# Patient Record
Sex: FEMALE | Race: WHITE | NOT HISPANIC OR LATINO | Employment: OTHER | URBAN - METROPOLITAN AREA
[De-identification: names, ages, dates, MRNs, and addresses within clinical notes are randomized per-mention and may not be internally consistent; named-entity substitution may affect disease eponyms.]

---

## 2017-01-17 ENCOUNTER — HOSPITAL ENCOUNTER (OUTPATIENT)
Dept: LAB | Facility: MEDICAL CENTER | Age: 67
End: 2017-01-17
Attending: ORTHOPAEDIC SURGERY
Payer: MEDICARE

## 2017-01-17 LAB
ANION GAP SERPL CALC-SCNC: 9 MMOL/L (ref 0–11.9)
BASOPHILS # BLD AUTO: 0.03 K/UL (ref 0–0.12)
BASOPHILS NFR BLD AUTO: 0.6 % (ref 0–1.8)
BUN SERPL-MCNC: 13 MG/DL (ref 8–22)
CALCIUM SERPL-MCNC: 9.7 MG/DL (ref 8.5–10.5)
CHLORIDE SERPL-SCNC: 104 MMOL/L (ref 96–112)
CO2 SERPL-SCNC: 25 MMOL/L (ref 20–33)
CREAT SERPL-MCNC: 0.96 MG/DL (ref 0.5–1.4)
EOSINOPHIL # BLD: 0.17 K/UL (ref 0–0.51)
EOSINOPHIL NFR BLD AUTO: 3.2 % (ref 0–6.9)
ERYTHROCYTE [DISTWIDTH] IN BLOOD BY AUTOMATED COUNT: 41.8 FL (ref 35.9–50)
GLUCOSE SERPL-MCNC: 118 MG/DL (ref 65–99)
HCT VFR BLD AUTO: 40.9 % (ref 37–47)
HGB BLD-MCNC: 14.1 G/DL (ref 12–16)
IMM GRANULOCYTES # BLD AUTO: 0.01 K/UL (ref 0–0.11)
IMM GRANULOCYTES NFR BLD AUTO: 0.2 % (ref 0–0.9)
INR PPP: 1.06 (ref 0.87–1.13)
LYMPHOCYTES # BLD: 1.02 K/UL (ref 1–4.8)
LYMPHOCYTES NFR BLD AUTO: 19 % (ref 22–41)
MCH RBC QN AUTO: 29.8 PG (ref 27–33)
MCHC RBC AUTO-ENTMCNC: 34.5 G/DL (ref 33.6–35)
MCV RBC AUTO: 86.5 FL (ref 81.4–97.8)
MONOCYTES # BLD: 0.47 K/UL (ref 0–0.85)
MONOCYTES NFR BLD AUTO: 8.7 % (ref 0–13.4)
NEUTROPHILS # BLD: 3.68 K/UL (ref 2–7.15)
NEUTROPHILS NFR BLD AUTO: 68.3 % (ref 44–72)
NRBC # BLD AUTO: 0 K/UL
NRBC BLD-RTO: 0 /100 WBC
PLATELET # BLD AUTO: 239 K/UL (ref 164–446)
PMV BLD AUTO: 9.5 FL (ref 9–12.9)
POTASSIUM SERPL-SCNC: 3.7 MMOL/L (ref 3.6–5.5)
PROTHROMBIN TIME: 14.1 SEC (ref 12–14.6)
RBC # BLD AUTO: 4.73 M/UL (ref 4.2–5.4)
SODIUM SERPL-SCNC: 138 MMOL/L (ref 135–145)
WBC # BLD AUTO: 5.4 K/UL (ref 4.8–10.8)

## 2017-01-17 PROCEDURE — 80048 BASIC METABOLIC PNL TOTAL CA: CPT

## 2017-01-17 PROCEDURE — 36415 COLL VENOUS BLD VENIPUNCTURE: CPT

## 2017-01-17 PROCEDURE — 85610 PROTHROMBIN TIME: CPT | Mod: GA

## 2017-01-17 PROCEDURE — 85025 COMPLETE CBC W/AUTO DIFF WBC: CPT

## 2017-01-19 RX ORDER — TRAZODONE HYDROCHLORIDE 50 MG/1
TABLET ORAL
Qty: 270 TAB | Refills: 0 | Status: SHIPPED | OUTPATIENT
Start: 2017-01-19 | End: 2017-03-15

## 2017-03-15 ENCOUNTER — HOSPITAL ENCOUNTER (INPATIENT)
Facility: MEDICAL CENTER | Age: 67
LOS: 11 days | DRG: 464 | End: 2017-03-27
Attending: EMERGENCY MEDICINE | Admitting: ORTHOPAEDIC SURGERY
Payer: MEDICARE

## 2017-03-15 ENCOUNTER — APPOINTMENT (OUTPATIENT)
Dept: RADIOLOGY | Facility: MEDICAL CENTER | Age: 67
DRG: 464 | End: 2017-03-15
Attending: EMERGENCY MEDICINE
Payer: MEDICARE

## 2017-03-15 DIAGNOSIS — T88.8XXA FLUID COLLECTION AT SURGICAL SITE, INITIAL ENCOUNTER: ICD-10-CM

## 2017-03-15 LAB
ALBUMIN SERPL BCP-MCNC: 4.4 G/DL (ref 3.2–4.9)
ALBUMIN/GLOB SERPL: 1.8 G/DL
ALP SERPL-CCNC: 152 U/L (ref 30–99)
ALT SERPL-CCNC: 280 U/L (ref 2–50)
ANION GAP SERPL CALC-SCNC: 11 MMOL/L (ref 0–11.9)
AST SERPL-CCNC: 330 U/L (ref 12–45)
BASOPHILS # BLD AUTO: 0.1 % (ref 0–1.8)
BASOPHILS # BLD: 0.02 K/UL (ref 0–0.12)
BILIRUB SERPL-MCNC: 2.5 MG/DL (ref 0.1–1.5)
BUN SERPL-MCNC: 19 MG/DL (ref 8–22)
CALCIUM SERPL-MCNC: 9 MG/DL (ref 8.5–10.5)
CHLORIDE SERPL-SCNC: 101 MMOL/L (ref 96–112)
CO2 SERPL-SCNC: 22 MMOL/L (ref 20–33)
CREAT SERPL-MCNC: 1.24 MG/DL (ref 0.5–1.4)
EOSINOPHIL # BLD AUTO: 0.01 K/UL (ref 0–0.51)
EOSINOPHIL NFR BLD: 0.1 % (ref 0–6.9)
ERYTHROCYTE [DISTWIDTH] IN BLOOD BY AUTOMATED COUNT: 43.7 FL (ref 35.9–50)
GFR SERPL CREATININE-BSD FRML MDRD: 43 ML/MIN/1.73 M 2
GLOBULIN SER CALC-MCNC: 2.5 G/DL (ref 1.9–3.5)
GLUCOSE SERPL-MCNC: 132 MG/DL (ref 65–99)
HCT VFR BLD AUTO: 37.7 % (ref 37–47)
HGB BLD-MCNC: 12.9 G/DL (ref 12–16)
LYMPHOCYTES # BLD AUTO: 0.44 K/UL (ref 1–4.8)
LYMPHOCYTES NFR BLD: 2.9 % (ref 22–41)
MCH RBC QN AUTO: 29.5 PG (ref 27–33)
MCHC RBC AUTO-ENTMCNC: 34.2 G/DL (ref 33.6–35)
MCV RBC AUTO: 86.3 FL (ref 81.4–97.8)
MONOCYTES # BLD AUTO: 0.87 K/UL (ref 0–0.85)
MONOCYTES NFR BLD AUTO: 5.8 % (ref 0–13.4)
NEUTROPHILS # BLD AUTO: 13.71 K/UL (ref 2–7.15)
NEUTROPHILS NFR BLD: 91.1 % (ref 44–72)
PLATELET # BLD AUTO: 205 K/UL (ref 164–446)
PMV BLD AUTO: 9.9 FL (ref 9–12.9)
POTASSIUM SERPL-SCNC: 4.1 MMOL/L (ref 3.6–5.5)
PROT SERPL-MCNC: 6.9 G/DL (ref 6–8.2)
RBC # BLD AUTO: 4.37 M/UL (ref 4.2–5.4)
SODIUM SERPL-SCNC: 134 MMOL/L (ref 135–145)
WBC # BLD AUTO: 15.1 K/UL (ref 4.8–10.8)

## 2017-03-15 PROCEDURE — 700102 HCHG RX REV CODE 250 W/ 637 OVERRIDE(OP): Performed by: EMERGENCY MEDICINE

## 2017-03-15 PROCEDURE — 85025 COMPLETE CBC W/AUTO DIFF WBC: CPT

## 2017-03-15 PROCEDURE — 80053 COMPREHEN METABOLIC PANEL: CPT

## 2017-03-15 PROCEDURE — 99285 EMERGENCY DEPT VISIT HI MDM: CPT

## 2017-03-15 PROCEDURE — 93971 EXTREMITY STUDY: CPT

## 2017-03-15 PROCEDURE — 36415 COLL VENOUS BLD VENIPUNCTURE: CPT

## 2017-03-15 PROCEDURE — 304562 HCHG STAT O2 MASK/CANNULA

## 2017-03-15 PROCEDURE — A9270 NON-COVERED ITEM OR SERVICE: HCPCS | Performed by: EMERGENCY MEDICINE

## 2017-03-15 RX ORDER — OXYCODONE HYDROCHLORIDE 5 MG/1
5 TABLET ORAL ONCE
Status: COMPLETED | OUTPATIENT
Start: 2017-03-15 | End: 2017-03-15

## 2017-03-15 RX ORDER — OXYCODONE AND ACETAMINOPHEN 10; 325 MG/1; MG/1
1-2 TABLET ORAL EVERY 4 HOURS PRN
COMMUNITY

## 2017-03-15 RX ADMIN — OXYCODONE HYDROCHLORIDE 5 MG: 5 TABLET ORAL at 22:30

## 2017-03-15 ASSESSMENT — PAIN SCALES - GENERAL: PAINLEVEL_OUTOF10: 8

## 2017-03-15 ASSESSMENT — PAIN SCALES - WONG BAKER: WONGBAKER_NUMERICALRESPONSE: HURTS A WHOLE LOT

## 2017-03-15 ASSESSMENT — LIFESTYLE VARIABLES: DO YOU DRINK ALCOHOL: NO

## 2017-03-16 ENCOUNTER — APPOINTMENT (OUTPATIENT)
Dept: RADIOLOGY | Facility: MEDICAL CENTER | Age: 67
DRG: 464 | End: 2017-03-16
Attending: ORTHOPAEDIC SURGERY
Payer: MEDICARE

## 2017-03-16 ENCOUNTER — APPOINTMENT (OUTPATIENT)
Dept: RADIOLOGY | Facility: MEDICAL CENTER | Age: 67
DRG: 464 | End: 2017-03-16
Attending: EMERGENCY MEDICINE
Payer: MEDICARE

## 2017-03-16 ENCOUNTER — APPOINTMENT (OUTPATIENT)
Dept: RADIOLOGY | Facility: MEDICAL CENTER | Age: 67
DRG: 464 | End: 2017-03-16
Attending: PHYSICIAN ASSISTANT
Payer: MEDICARE

## 2017-03-16 ENCOUNTER — RESOLUTE PROFESSIONAL BILLING HOSPITAL PROF FEE (OUTPATIENT)
Dept: HOSPITALIST | Facility: MEDICAL CENTER | Age: 67
End: 2017-03-16
Payer: MEDICARE

## 2017-03-16 PROBLEM — R79.89 ELEVATED LFTS: Status: ACTIVE | Noted: 2017-03-16

## 2017-03-16 PROBLEM — K76.0 FATTY LIVER DISEASE, NONALCOHOLIC: Status: ACTIVE | Noted: 2017-03-16

## 2017-03-16 PROBLEM — K02.9 DENTAL CARIES: Status: ACTIVE | Noted: 2017-03-16

## 2017-03-16 PROBLEM — T88.8XXA FLUID COLLECTION AT SURGICAL SITE: Status: ACTIVE | Noted: 2017-03-16

## 2017-03-16 PROBLEM — K83.8 COMMON BILE DUCT DILATATION: Status: ACTIVE | Noted: 2017-03-16

## 2017-03-16 LAB
ALBUMIN SERPL BCP-MCNC: 4.3 G/DL (ref 3.2–4.9)
ALP SERPL-CCNC: 125 U/L (ref 30–99)
ALT SERPL-CCNC: 206 U/L (ref 2–50)
APAP SERPL-MCNC: <10 UG/ML (ref 10–30)
APAP SERPL-MCNC: <10 UG/ML (ref 10–30)
AST SERPL-CCNC: 192 U/L (ref 12–45)
BILIRUB CONJ SERPL-MCNC: 0.9 MG/DL (ref 0.1–0.5)
BILIRUB INDIRECT SERPL-MCNC: 1.5 MG/DL (ref 0–1)
BILIRUB SERPL-MCNC: 2.4 MG/DL (ref 0.1–1.5)
GLUCOSE BLD-MCNC: 135 MG/DL (ref 65–99)
LACTATE BLD-SCNC: 1.2 MMOL/L (ref 0.5–2)
LACTATE BLD-SCNC: 1.5 MMOL/L (ref 0.5–2)
PROT SERPL-MCNC: 6.9 G/DL (ref 6–8.2)

## 2017-03-16 PROCEDURE — 0HBLXZZ EXCISION OF LEFT LOWER LEG SKIN, EXTERNAL APPROACH: ICD-10-PCS | Performed by: ORTHOPAEDIC SURGERY

## 2017-03-16 PROCEDURE — 87077 CULTURE AEROBIC IDENTIFY: CPT

## 2017-03-16 PROCEDURE — 0S9B3ZX DRAINAGE OF LEFT HIP JOINT, PERCUTANEOUS APPROACH, DIAGNOSTIC: ICD-10-PCS | Performed by: ORTHOPAEDIC SURGERY

## 2017-03-16 PROCEDURE — 700102 HCHG RX REV CODE 250 W/ 637 OVERRIDE(OP)

## 2017-03-16 PROCEDURE — 110382 HCHG SHELL REV 271: Performed by: ORTHOPAEDIC SURGERY

## 2017-03-16 PROCEDURE — 80076 HEPATIC FUNCTION PANEL: CPT

## 2017-03-16 PROCEDURE — 502000 HCHG MISC OR IMPLANTS RC 0278: Performed by: ORTHOPAEDIC SURGERY

## 2017-03-16 PROCEDURE — 160009 HCHG ANES TIME/MIN: Performed by: ORTHOPAEDIC SURGERY

## 2017-03-16 PROCEDURE — A9270 NON-COVERED ITEM OR SERVICE: HCPCS | Performed by: NURSE PRACTITIONER

## 2017-03-16 PROCEDURE — 99223 1ST HOSP IP/OBS HIGH 75: CPT | Mod: AI | Performed by: INTERNAL MEDICINE

## 2017-03-16 PROCEDURE — L1830 KO IMMOB CANVAS LONG PRE OTS: HCPCS | Performed by: ORTHOPAEDIC SURGERY

## 2017-03-16 PROCEDURE — 110371 HCHG SHELL REV 272: Performed by: ORTHOPAEDIC SURGERY

## 2017-03-16 PROCEDURE — 36415 COLL VENOUS BLD VENIPUNCTURE: CPT

## 2017-03-16 PROCEDURE — 3E0U029 INTRODUCTION OF OTHER ANTI-INFECTIVE INTO JOINTS, OPEN APPROACH: ICD-10-PCS | Performed by: ORTHOPAEDIC SURGERY

## 2017-03-16 PROCEDURE — 700111 HCHG RX REV CODE 636 W/ 250 OVERRIDE (IP): Performed by: PHARMACIST

## 2017-03-16 PROCEDURE — 76705 ECHO EXAM OF ABDOMEN: CPT

## 2017-03-16 PROCEDURE — 500088 HCHG BLADE, SAGITTAL: Performed by: ORTHOPAEDIC SURGERY

## 2017-03-16 PROCEDURE — 700111 HCHG RX REV CODE 636 W/ 250 OVERRIDE (IP)

## 2017-03-16 PROCEDURE — 87205 SMEAR GRAM STAIN: CPT | Mod: 91

## 2017-03-16 PROCEDURE — 700117 HCHG RX CONTRAST REV CODE 255: Performed by: EMERGENCY MEDICINE

## 2017-03-16 PROCEDURE — 87015 SPECIMEN INFECT AGNT CONCNTJ: CPT

## 2017-03-16 PROCEDURE — 87070 CULTURE OTHR SPECIMN AEROBIC: CPT | Mod: 91

## 2017-03-16 PROCEDURE — 700105 HCHG RX REV CODE 258

## 2017-03-16 PROCEDURE — 160036 HCHG PACU - EA ADDL 30 MINS PHASE I: Performed by: ORTHOPAEDIC SURGERY

## 2017-03-16 PROCEDURE — 0QBF0ZX EXCISION OF LEFT PATELLA, OPEN APPROACH, DIAGNOSTIC: ICD-10-PCS | Performed by: ORTHOPAEDIC SURGERY

## 2017-03-16 PROCEDURE — 700102 HCHG RX REV CODE 250 W/ 637 OVERRIDE(OP): Performed by: NURSE PRACTITIONER

## 2017-03-16 PROCEDURE — A4314 CATH W/DRAINAGE 2-WAY LATEX: HCPCS | Performed by: ORTHOPAEDIC SURGERY

## 2017-03-16 PROCEDURE — A6223 GAUZE >16<=48 NO W/SAL W/O B: HCPCS | Performed by: ORTHOPAEDIC SURGERY

## 2017-03-16 PROCEDURE — 80307 DRUG TEST PRSMV CHEM ANLYZR: CPT

## 2017-03-16 PROCEDURE — 160039 HCHG SURGERY MINUTES - EA ADDL 1 MIN LEVEL 3: Performed by: ORTHOPAEDIC SURGERY

## 2017-03-16 PROCEDURE — 0SUD09Z SUPPLEMENT LEFT KNEE JOINT WITH LINER, OPEN APPROACH: ICD-10-PCS | Performed by: ORTHOPAEDIC SURGERY

## 2017-03-16 PROCEDURE — 0QBC0ZX EXCISION OF LEFT LOWER FEMUR, OPEN APPROACH, DIAGNOSTIC: ICD-10-PCS | Performed by: ORTHOPAEDIC SURGERY

## 2017-03-16 PROCEDURE — 73701 CT LOWER EXTREMITY W/DYE: CPT | Mod: LT

## 2017-03-16 PROCEDURE — 700105 HCHG RX REV CODE 258: Performed by: PHARMACIST

## 2017-03-16 PROCEDURE — 502240 HCHG MISC OR SUPPLY RC 0272: Performed by: ORTHOPAEDIC SURGERY

## 2017-03-16 PROCEDURE — 700105 HCHG RX REV CODE 258: Performed by: INTERNAL MEDICINE

## 2017-03-16 PROCEDURE — A9270 NON-COVERED ITEM OR SERVICE: HCPCS

## 2017-03-16 PROCEDURE — A4606 OXYGEN PROBE USED W OXIMETER: HCPCS | Performed by: ORTHOPAEDIC SURGERY

## 2017-03-16 PROCEDURE — 500811 HCHG LENS/HOOD FOR SPACESUIT: Performed by: ORTHOPAEDIC SURGERY

## 2017-03-16 PROCEDURE — 501487 HCHG STRYKER TIP: Performed by: ORTHOPAEDIC SURGERY

## 2017-03-16 PROCEDURE — 700111 HCHG RX REV CODE 636 W/ 250 OVERRIDE (IP): Performed by: EMERGENCY MEDICINE

## 2017-03-16 PROCEDURE — 160035 HCHG PACU - 1ST 60 MINS PHASE I: Performed by: ORTHOPAEDIC SURGERY

## 2017-03-16 PROCEDURE — 160048 HCHG OR STATISTICAL LEVEL 1-5: Performed by: ORTHOPAEDIC SURGERY

## 2017-03-16 PROCEDURE — 700111 HCHG RX REV CODE 636 W/ 250 OVERRIDE (IP): Performed by: INTERNAL MEDICINE

## 2017-03-16 PROCEDURE — 0HBJXZZ EXCISION OF LEFT UPPER LEG SKIN, EXTERNAL APPROACH: ICD-10-PCS | Performed by: ORTHOPAEDIC SURGERY

## 2017-03-16 PROCEDURE — 500053 HCHG BANDAGE, ELASTIC 4: Performed by: ORTHOPAEDIC SURGERY

## 2017-03-16 PROCEDURE — 73501 X-RAY EXAM HIP UNI 1 VIEW: CPT | Mod: LT

## 2017-03-16 PROCEDURE — 502579 HCHG PACK, TOTAL KNEE: Performed by: ORTHOPAEDIC SURGERY

## 2017-03-16 PROCEDURE — 501486 HCHG STRYKER IRRIG SET HC W/TUBING: Performed by: ORTHOPAEDIC SURGERY

## 2017-03-16 PROCEDURE — 501838 HCHG SUTURE GENERAL: Performed by: ORTHOPAEDIC SURGERY

## 2017-03-16 PROCEDURE — 96374 THER/PROPH/DIAG INJ IV PUSH: CPT

## 2017-03-16 PROCEDURE — 160002 HCHG RECOVERY MINUTES (STAT): Performed by: ORTHOPAEDIC SURGERY

## 2017-03-16 PROCEDURE — 500881 HCHG PACK, EXTREMITY: Performed by: ORTHOPAEDIC SURGERY

## 2017-03-16 PROCEDURE — 87040 BLOOD CULTURE FOR BACTERIA: CPT

## 2017-03-16 PROCEDURE — 770006 HCHG ROOM/CARE - MED/SURG/GYN SEMI*

## 2017-03-16 PROCEDURE — 83605 ASSAY OF LACTIC ACID: CPT

## 2017-03-16 PROCEDURE — 501330 HCHG SET, CYSTO IRRIG TUBING: Performed by: ORTHOPAEDIC SURGERY

## 2017-03-16 PROCEDURE — 0S9D3ZX DRAINAGE OF LEFT KNEE JOINT, PERCUTANEOUS APPROACH, DIAGNOSTIC: ICD-10-PCS | Performed by: ORTHOPAEDIC SURGERY

## 2017-03-16 PROCEDURE — 0SPD09Z REMOVAL OF LINER FROM LEFT KNEE JOINT, OPEN APPROACH: ICD-10-PCS | Performed by: ORTHOPAEDIC SURGERY

## 2017-03-16 PROCEDURE — 82962 GLUCOSE BLOOD TEST: CPT

## 2017-03-16 PROCEDURE — 87075 CULTR BACTERIA EXCEPT BLOOD: CPT | Mod: 91

## 2017-03-16 PROCEDURE — 0SPS0JZ REMOVAL OF SYNTHETIC SUBSTITUTE FROM LEFT HIP JOINT, FEMORAL SURFACE, OPEN APPROACH: ICD-10-PCS | Performed by: ORTHOPAEDIC SURGERY

## 2017-03-16 PROCEDURE — 160028 HCHG SURGERY MINUTES - 1ST 30 MINS LEVEL 3: Performed by: ORTHOPAEDIC SURGERY

## 2017-03-16 PROCEDURE — 87186 SC STD MICRODIL/AGAR DIL: CPT

## 2017-03-16 PROCEDURE — 501480 HCHG STOCKINETTE: Performed by: ORTHOPAEDIC SURGERY

## 2017-03-16 PROCEDURE — 500093 HCHG BONE CEMENT MIXER: Performed by: ORTHOPAEDIC SURGERY

## 2017-03-16 DEVICE — BEADS STIMULAN CALCIUM SULFATE: Type: IMPLANTABLE DEVICE | Status: FUNCTIONAL

## 2017-03-16 RX ORDER — MORPHINE SULFATE 4 MG/ML
2-4 INJECTION, SOLUTION INTRAMUSCULAR; INTRAVENOUS
Status: DISCONTINUED | OUTPATIENT
Start: 2017-03-16 | End: 2017-03-27 | Stop reason: HOSPADM

## 2017-03-16 RX ORDER — TRAZODONE HYDROCHLORIDE 150 MG/1
150 TABLET ORAL
Status: DISCONTINUED | OUTPATIENT
Start: 2017-03-16 | End: 2017-03-27 | Stop reason: HOSPADM

## 2017-03-16 RX ORDER — OXYCODONE HCL 5 MG/5 ML
SOLUTION, ORAL ORAL
Status: COMPLETED
Start: 2017-03-16 | End: 2017-03-16

## 2017-03-16 RX ORDER — SODIUM CHLORIDE 9 MG/ML
500 INJECTION, SOLUTION INTRAVENOUS
Status: COMPLETED | OUTPATIENT
Start: 2017-03-16 | End: 2017-03-27

## 2017-03-16 RX ORDER — ONDANSETRON 2 MG/ML
4 INJECTION INTRAMUSCULAR; INTRAVENOUS ONCE
Status: COMPLETED | OUTPATIENT
Start: 2017-03-16 | End: 2017-03-16

## 2017-03-16 RX ORDER — OXYCODONE HYDROCHLORIDE 5 MG/1
5 TABLET ORAL EVERY 6 HOURS PRN
Status: DISCONTINUED | OUTPATIENT
Start: 2017-03-16 | End: 2017-03-17

## 2017-03-16 RX ORDER — TRAMADOL HYDROCHLORIDE 50 MG/1
50 TABLET ORAL EVERY 6 HOURS PRN
Status: DISCONTINUED | OUTPATIENT
Start: 2017-03-16 | End: 2017-03-27 | Stop reason: HOSPADM

## 2017-03-16 RX ORDER — AMOXICILLIN 250 MG
2 CAPSULE ORAL 2 TIMES DAILY
Status: DISCONTINUED | OUTPATIENT
Start: 2017-03-16 | End: 2017-03-27 | Stop reason: HOSPADM

## 2017-03-16 RX ORDER — SODIUM CHLORIDE 9 MG/ML
INJECTION, SOLUTION INTRAVENOUS
Status: COMPLETED
Start: 2017-03-16 | End: 2017-03-16

## 2017-03-16 RX ORDER — OXYCODONE HYDROCHLORIDE 10 MG/1
10 TABLET ORAL EVERY 6 HOURS PRN
Status: DISCONTINUED | OUTPATIENT
Start: 2017-03-16 | End: 2017-03-17

## 2017-03-16 RX ORDER — LOSARTAN POTASSIUM 50 MG/1
50 TABLET ORAL DAILY
Status: DISCONTINUED | OUTPATIENT
Start: 2017-03-16 | End: 2017-03-27 | Stop reason: HOSPADM

## 2017-03-16 RX ORDER — MIDAZOLAM HYDROCHLORIDE 1 MG/ML
INJECTION INTRAMUSCULAR; INTRAVENOUS
Status: COMPLETED
Start: 2017-03-16 | End: 2017-03-16

## 2017-03-16 RX ORDER — SODIUM CHLORIDE 9 MG/ML
30 INJECTION, SOLUTION INTRAVENOUS
Status: DISCONTINUED | OUTPATIENT
Start: 2017-03-16 | End: 2017-03-27 | Stop reason: HOSPADM

## 2017-03-16 RX ORDER — ONDANSETRON 2 MG/ML
4 INJECTION INTRAMUSCULAR; INTRAVENOUS EVERY 4 HOURS PRN
Status: DISCONTINUED | OUTPATIENT
Start: 2017-03-16 | End: 2017-03-27 | Stop reason: HOSPADM

## 2017-03-16 RX ORDER — POLYETHYLENE GLYCOL 3350 17 G/17G
1 POWDER, FOR SOLUTION ORAL
Status: DISCONTINUED | OUTPATIENT
Start: 2017-03-16 | End: 2017-03-27 | Stop reason: HOSPADM

## 2017-03-16 RX ORDER — BISACODYL 10 MG
10 SUPPOSITORY, RECTAL RECTAL
Status: DISCONTINUED | OUTPATIENT
Start: 2017-03-16 | End: 2017-03-27 | Stop reason: HOSPADM

## 2017-03-16 RX ORDER — OXYBUTYNIN CHLORIDE 5 MG/1
10 TABLET ORAL
Status: DISCONTINUED | OUTPATIENT
Start: 2017-03-16 | End: 2017-03-27 | Stop reason: HOSPADM

## 2017-03-16 RX ORDER — ONDANSETRON 4 MG/1
4 TABLET, ORALLY DISINTEGRATING ORAL EVERY 4 HOURS PRN
Status: DISCONTINUED | OUTPATIENT
Start: 2017-03-16 | End: 2017-03-27 | Stop reason: HOSPADM

## 2017-03-16 RX ORDER — SODIUM CHLORIDE 9 MG/ML
INJECTION, SOLUTION INTRAVENOUS CONTINUOUS
Status: DISCONTINUED | OUTPATIENT
Start: 2017-03-16 | End: 2017-03-17

## 2017-03-16 RX ORDER — OMEPRAZOLE 20 MG/1
20 CAPSULE, DELAYED RELEASE ORAL DAILY
Status: DISCONTINUED | OUTPATIENT
Start: 2017-03-16 | End: 2017-03-27 | Stop reason: HOSPADM

## 2017-03-16 RX ORDER — SERTRALINE HYDROCHLORIDE 100 MG/1
100 TABLET, FILM COATED ORAL DAILY
Status: DISCONTINUED | OUTPATIENT
Start: 2017-03-16 | End: 2017-03-27 | Stop reason: HOSPADM

## 2017-03-16 RX ORDER — MORPHINE SULFATE 4 MG/ML
2 INJECTION, SOLUTION INTRAMUSCULAR; INTRAVENOUS
Status: DISCONTINUED | OUTPATIENT
Start: 2017-03-16 | End: 2017-03-16 | Stop reason: CLARIF

## 2017-03-16 RX ADMIN — OXYCODONE HYDROCHLORIDE 10 MG: 5 SOLUTION ORAL at 20:30

## 2017-03-16 RX ADMIN — MORPHINE SULFATE 4 MG: 4 INJECTION INTRAVENOUS at 05:22

## 2017-03-16 RX ADMIN — PIPERACILLIN AND TAZOBACTAM 3.38 G: 3; .375 INJECTION, POWDER, LYOPHILIZED, FOR SOLUTION INTRAVENOUS; PARENTERAL at 23:26

## 2017-03-16 RX ADMIN — MIDAZOLAM 0.5 MG: 1 INJECTION INTRAMUSCULAR; INTRAVENOUS at 20:30

## 2017-03-16 RX ADMIN — MORPHINE SULFATE 2 MG: 4 INJECTION INTRAVENOUS at 07:58

## 2017-03-16 RX ADMIN — OXYCODONE HYDROCHLORIDE 10 MG: 10 TABLET ORAL at 23:26

## 2017-03-16 RX ADMIN — PIPERACILLIN AND TAZOBACTAM 3.38 G: 3; .375 INJECTION, POWDER, LYOPHILIZED, FOR SOLUTION INTRAVENOUS; PARENTERAL at 12:28

## 2017-03-16 RX ADMIN — SODIUM CHLORIDE: 9 INJECTION, SOLUTION INTRAVENOUS at 05:22

## 2017-03-16 RX ADMIN — MIDAZOLAM 0.5 MG: 1 INJECTION INTRAMUSCULAR; INTRAVENOUS at 21:05

## 2017-03-16 RX ADMIN — MORPHINE SULFATE 4 MG: 4 INJECTION INTRAVENOUS at 23:26

## 2017-03-16 RX ADMIN — FENTANYL CITRATE 50 MCG: 50 INJECTION, SOLUTION INTRAMUSCULAR; INTRAVENOUS at 20:50

## 2017-03-16 RX ADMIN — MIDAZOLAM 0.5 MG: 1 INJECTION INTRAMUSCULAR; INTRAVENOUS at 21:10

## 2017-03-16 RX ADMIN — MIDAZOLAM 0.5 MG: 1 INJECTION INTRAMUSCULAR; INTRAVENOUS at 21:20

## 2017-03-16 RX ADMIN — MIDAZOLAM 0.5 MG: 1 INJECTION INTRAMUSCULAR; INTRAVENOUS at 20:45

## 2017-03-16 RX ADMIN — FENTANYL CITRATE 50 MCG: 50 INJECTION, SOLUTION INTRAMUSCULAR; INTRAVENOUS at 20:35

## 2017-03-16 RX ADMIN — FENTANYL CITRATE 50 MCG: 50 INJECTION, SOLUTION INTRAMUSCULAR; INTRAVENOUS at 20:20

## 2017-03-16 RX ADMIN — IOHEXOL 100 ML: 350 INJECTION, SOLUTION INTRAVENOUS at 01:42

## 2017-03-16 RX ADMIN — MIDAZOLAM 0.5 MG: 1 INJECTION INTRAMUSCULAR; INTRAVENOUS at 21:30

## 2017-03-16 RX ADMIN — PIPERACILLIN AND TAZOBACTAM 3.38 G: 3; .375 INJECTION, POWDER, LYOPHILIZED, FOR SOLUTION INTRAVENOUS; PARENTERAL at 17:19

## 2017-03-16 RX ADMIN — ONDANSETRON 4 MG: 2 INJECTION, SOLUTION INTRAMUSCULAR; INTRAVENOUS at 00:47

## 2017-03-16 RX ADMIN — SODIUM CHLORIDE: 9 INJECTION, SOLUTION INTRAVENOUS at 21:15

## 2017-03-16 RX ADMIN — MORPHINE SULFATE 2 MG: 4 INJECTION INTRAVENOUS at 12:28

## 2017-03-16 RX ADMIN — VANCOMYCIN HYDROCHLORIDE 2200 MG: 100 INJECTION, POWDER, LYOPHILIZED, FOR SOLUTION INTRAVENOUS at 08:07

## 2017-03-16 RX ADMIN — FENTANYL CITRATE 50 MCG: 50 INJECTION, SOLUTION INTRAMUSCULAR; INTRAVENOUS at 21:00

## 2017-03-16 RX ADMIN — MIDAZOLAM 0.5 MG: 1 INJECTION INTRAMUSCULAR; INTRAVENOUS at 20:40

## 2017-03-16 RX ADMIN — SODIUM CHLORIDE: 9 INJECTION, SOLUTION INTRAVENOUS at 17:17

## 2017-03-16 RX ADMIN — MIDAZOLAM 0.5 MG: 1 INJECTION INTRAMUSCULAR; INTRAVENOUS at 20:55

## 2017-03-16 ASSESSMENT — ENCOUNTER SYMPTOMS
EYES NEGATIVE: 1
VOMITING: 0
DEPRESSION: 0
ABDOMINAL PAIN: 0
FEVER: 0
COUGH: 0
MYALGIAS: 1
NECK PAIN: 0
LOSS OF CONSCIOUSNESS: 0
HEADACHES: 0
BLURRED VISION: 0
NAUSEA: 0
DIZZINESS: 0

## 2017-03-16 ASSESSMENT — PAIN SCALES - GENERAL
PAINLEVEL_OUTOF10: 0
PAINLEVEL_OUTOF10: 6
PAINLEVEL_OUTOF10: 9
PAINLEVEL_OUTOF10: 10
PAINLEVEL_OUTOF10: 2
PAINLEVEL_OUTOF10: 6
PAINLEVEL_OUTOF10: 5
PAINLEVEL_OUTOF10: 4
PAINLEVEL_OUTOF10: 6
PAINLEVEL_OUTOF10: 10
PAINLEVEL_OUTOF10: 9
PAINLEVEL_OUTOF10: 10

## 2017-03-16 ASSESSMENT — LIFESTYLE VARIABLES
EVER_SMOKED: NEVER
ALCOHOL_USE: NO

## 2017-03-16 NOTE — PROGRESS NOTES
"Pharmacy Kinetics 66 y.o. female on vancomycin day # 1 3/16/2017    Currently on Vancomycin 2200 mg iv LD (given @0800)    Indication for Treatment: SSTI    Pertinent history per medical record: Admitted on 3/15/2017 for fluid collection at surgical site. Pt presents to ED with c/o increasing pain and redness around surgical wound area previously drained. Pt has PMH significant for DM2, COPD, and HTN.    Other antibiotics: Zosyn 3.375 gm iv q6h    Allergies: Review of patient's allergies indicates no known allergies.     List concerns for renal function: age, CT with contrast 3/16, DM2, SIRS  -- elevated WBC    Pertinent cultures to date:   none    Recent Labs      03/15/17   2215   WBC  15.1*   NEUTSPOLYS  91.10*     Recent Labs      03/15/17   2215  17   0740   BUN  19   --    CREATININE  1.24   --    ALBUMIN  4.4  4.3     No results for input(s): VANCOTROUGH, VANCOPEAK, VANCORANDOM in the last 72 hours.No intake or output data in the 24 hours ending 17 1532   Blood pressure 137/77, pulse 87, temperature 38.2 °C (100.8 °F), temperature source Temporal, resp. rate 16, height 1.702 m (5' 7\"), weight 86.183 kg (190 lb), last menstrual period 10/01/1990, SpO2 93 %, not currently breastfeeding. Temp (24hrs), Av.6 °C (99.6 °F), Min:36.4 °C (97.6 °F), Max:38.2 °C (100.8 °F)      A/P   1. Vancomycin dose change: none at this time  2. Next vancomycin level: 3/17 @0700 (23-hr level)  3. Goal trough: 12-16 mcg/ml  4. Comments: No cultures, no repeat chemistry yet. Pt febrile through the day, otherwise vitals stable. Vancomycin times level for tomorrow AM, will assess for further dosing at that time. Pharmacy will follow for plan of care/clinical progress.    Tom Alarcon, AnandD        "

## 2017-03-16 NOTE — ED NOTES
Patient to have surgery tomorrow  on L leg per dr Gillette, patient has draining incision site on L lateral thigh, spoke w admission, needs admission orders

## 2017-03-16 NOTE — PROGRESS NOTES
Recd report, assumed care. Pt A&O*4, c/o pain, see MAR. VSS. Assessment complete. No family at bedside. All needs met. Bed in low/locked position, call light in reach, white board updated, hourly rounding in place. Will continue to monitor.

## 2017-03-16 NOTE — ED NOTES
Pt wheeled to room. Report same as triage.  Pt had sx on L femur Jan 19 - incision slightly open, palpable lump tender to palpation.

## 2017-03-16 NOTE — H&P
IDENTIFICATION:  The patient is a 66-year-old female patient of Dr. Zeb Burdick and gynecologic oncologist, Dr. Fabian Craig.      CHIEF COMPLAINT:  Left hip infection.      HISTORY OF PRESENT ILLNESS:  The patient is a 66-year-old female who had left   hip surgery with Dr. Irving 2 months ago.  She states that she started having   some redness and increased pain and swelling over the left hip and was seen by   Dr. Irving and told she needed to come to the hospital today to have surgical   drainage of this area.  She came earlier than her intended admission, because   she states she was taking more of her pain medication at home than she had   needed previously and ran out of her pills, so she comes to the hospital now   in severe discomfort and to be admitted to the hospital for her scheduled   surgery.  She denies any fevers, chills, nausea, vomiting, lightheadedness,   dizziness, rash, itching or diarrhea.  She does have pain in her right   shoulder which is chronic and due to degenerative arthritis.      REVIEW OF SYSTEMS:  A 10-point review of systems was reviewed and otherwise   negative.    PAST MEDICAL HISTORY:  Vulvar cancer treated 3 years ago with Dr. Craig with   radiation and chemotherapy.  She did have recurrence of this.  Tooth   extraction 2 weeks ago, patient does have multiple dental caries and broken   teeth, essential hypertension, dyslipidemia, gastroesophageal reflux disease,   type 2 diabetes mellitus, degenerative arthritis, knee revision, vitamin D   deficiency.      OUTPATIENT MEDICATIONS:  Percocet 10/325 mg, patient states this is supposed   to be 2 tablets twice daily; however, she states the last few days, she has   been taking 4 tablets at a time, at least twice daily, sometimes more than   twice a day and ran out of these medications yesterday at noon; Prilosec 20 mg   daily; Cozaar 50 mg daily; oxybutynin 10 mg at bedtime; Zoloft 100 mg daily   and trazodone 150 mg at bedtime as needed  for insomnia.      DRUG ALLERGIES:  None.      SOCIAL HISTORY:  Patient denies any tobacco use now or in the past.  She does   not drink alcohol and denies any drug use.      FAMILY HISTORY:  Mother had liver cancer and father had heart disease.      PHYSICAL EXAMINATION:    VITAL SIGNS:  Temperature 99.3, blood pressure 141/68, heart rate 90,   respiratory rate 16 and oxygen saturation 93% on room air.    GENERAL:  Patient is a pleasant female who is in some obvious discomfort from   her left leg and shoulder pain.  She is having short episodes of   hyperventilation and is breathing irregularly because she states she is in so   much pain.    HEENT:  Head atraumatic.  Extraocular movements intact.  Pupils are equal,   round and reactive to light.  Sclerae clear.  Conjunctivae pink.  Dentition   poor.    NECK:  Supple, no jugular venous distension.  Trachea midline.  No anterior,   posterior cervical or supraclavicular lymphadenopathy.    HEART:  Regular rate and rhythm with no murmur.  Point of maximal impulse is   diffuse.    LUNGS:  Clear to auscultation bilaterally with good breath sounds to the   bases.    CHEST:  Symmetric with respiration.    ABDOMEN:  Soft, nontender, nondistended with normoactive bowel sounds.  No   palpable hepatosplenomegaly.    EXTREMITIES:  No clubbing, cyanosis or edema.  Pedal pulses 2+ bilaterally.    Left hip does have surgical scar which is well healed, but does have some   surrounding erythema, no evidence of drainage.    NEUROLOGIC:  Patient is alert and oriented x3.  She is moving all 4   extremities equally.      LABORATORY DATA:  WBC 15.1, hemoglobin 12.9 and platelets 205.  Sodium 134,   potassium 4.1, chloride 101, bicarbonate 22, BUN 19, creatinine 1.24, ,   , alkaline phosphatase 152, bilirubin 2.5.      IMAGING:  CT scan of the left lower extremity shows left lateral thigh fluid   collection with hardware and artifact in place.  Fluid collection is at least    15 cm in length and probably subcutaneous in location.      ASSESSMENT AND PLAN:    1.  Acute infection with left leg fluid collection.  Patient will be admitted   to the hospital.  She does not have any evidence of sepsis at this time.  I   will treat with intravenous antibiotic therapy and we will order for blood   cultures and start her on antibiotics consisting of Zosyn and vancomycin.  Dr. Irving has been consulted on the case for surgical irrigation and debridement   of her fluid collection.    2.  Elevated liver enzymes, concerning for Tylenol overdose.  I have ordered   for a stat Tylenol level and I will check ultrasound of the liver and biliary   tree.  I will also check a hepatitis panel.    3.  Hypertension.  I will continue Cozaar.    4.  Urinary incontinence due overactive bladder.  I will continue Ditropan.    5.  Severe pain with degenerative arthritis.  We will order for tramadol and   intravenous morphine as needed for pain.  I will avoid Tylenol acutely due to   her elevated liver transaminases.    6.  Patient is a full code.  She will need greater than 2-midnight stay for   treatment of her acute fluid collection within the left hip and workup of her   elevated liver transaminases.       ____________________________________     MD BG MAY / DEVEN    DD:  03/16/2017 04:28:25  DT:  03/16/2017 05:07:16    D#:  [id number]  Job#:  [id number]

## 2017-03-16 NOTE — DISCHARGE PLANNING
Care Transition Team Assessment    Information Source  Orientation : Oriented x 4  Information Given By: Patient  Informant's Name: [place]  Who is responsible for making decisions for patient? : Patient    Readmission Evaluation  Is this a readmission?: No    Elopement Risk  Legal Hold: No  Ambulatory or Self Mobile in Wheelchair: Yes  Disoriented: No  Psychiatric Symptoms: None  History of Wandering: No  Elopement this Admit: No  Vocalizing Wanting to Leave: No  Displays Behaviors, Body Language Wanting to Leave: No-Not at Risk for Elopement    Interdisciplinary Discharge Planning  Primary Care Physician: Dr. Zeb Burdick  Lives with - Patient's Self Care Capacity: Spouse  Patient or legal guardian wants to designate a caregiver (see row info): No  Support Systems: Spouse / Significant Other  Housing / Facility: [address]  Do You Take your Prescribed Medications Regularly: Yes  Able to Return to Previous ADL's: Yes  Mobility Issues: No  Prior Services: None  Patient Expects to be Discharged to:: Home  Assistance Needed: No  Durable Medical Equipment: Walker (Cane and shower equipment at home)  DME Provider / Phone: Unknown    Discharge Preparedness  What is your plan after discharge?: Home with help  What are your discharge supports?: Spouse  Prior Functional Level: Ambulatory, Drives Self, Independent with Activities of Daily Living, Independent with Medication Management  Difficulity with ADLs: None  Difficulity with IADLs: None    Functional Assesment  Prior Functional Level: Ambulatory, Drives Self, Independent with Activities of Daily Living, Independent with Medication Management    Finances  Financial Barriers to Discharge: No  Prescription Coverage: Yes ([facility] in [place])    Vision / Hearing Impairment  Vision Impairment : No  Right Eye Vision: Wears Glasses  Left Eye Vision: Wears Glasses  Hearing Impairment : No    Values / Beliefs / Concerns  Values / Beliefs Concerns : No  Special Hospitalization  Concerns: none    Advance Directive  Advance Directive?: None  Advance Directive offered?: AD Booklet refused    Domestic Abuse  Have you ever been the victim of abuse or violence?: No  Physical Abuse or Sexual Abuse: No  Verbal Abuse or Emotional Abuse: No  Possible Abuse Reported to:: Not Applicable    Psychological Assessment  History of Substance Abuse: None  History of Psychiatric Problems: No  Non-compliant with Treatment: No  Newly Diagnosed Illness: No    Discharge Risks or Barriers  Discharge risks or barriers?: No    Anticipated Discharge Information  Anticipated discharge disposition: Home  Discharge Address: [address] [place] [id number]  Discharge Contact Phone Number: [phone]

## 2017-03-16 NOTE — CARE PLAN
Problem: Knowledge Deficit  Goal: Knowledge of disease process/condition, treatment plan, diagnostic tests, and medications will improve  Plan for I&D this afternoon

## 2017-03-16 NOTE — PROGRESS NOTES
"Pharmacy Kinetics 66 y.o. female on vancomycin day # 1 3/16/2017    Currently on Vancomycin 2200 mg iv LD ordered    Indication for Treatment: SSTI    Pertinent history per medical record: Admitted on 3/15/2017 for fluid collection at surgical site. Pt presents to ED with c/o increasing pain and redness around surgical wound area previously drained. Pt has PMH significant for DM2, COPD, and HTN.    Other antibiotics: Zosyn 3.375 gm iv q6h    Allergies: Review of patient's allergies indicates no known allergies.     List concerns for renal function: age, CT with contrast 3/16, DM2, SIRS  -- elevated WBC     3/15/2017 22:15   AST(SGOT) 330 (H)   ALT(SGPT) 280 (H)   Alkaline Phosphatase 152 (H)     Pertinent cultures to date:   None at this time    Recent Labs      03/15/17   2215   WBC  15.1*   NEUTSPOLYS  91.10*     Recent Labs      03/15/17   2215   BUN  19   CREATININE  1.24   ALBUMIN  4.4     Blood pressure 117/79, pulse 90, temperature 37.4 °C (99.3 °F), temperature source Temporal, resp. rate 17, height 1.702 m (5' 7\"), weight 86.183 kg (190 lb), last menstrual period 10/01/1990, SpO2 93 %. Temp (24hrs), Av.4 °C (99.3 °F), Min:37.4 °C (99.3 °F), Max:37.4 °C (99.3 °F)      A/P   1. Vancomycin dose change: new start. pulse dose.  2. Next vancomycin level: not ordered  3. Goal trough: 12-16 mcg/ml  4. Comments: Concerns for renal function. DM2. Abnormal liver enzymes. CT with contrast. Loading dose 2200 mg (25 mg/kg x 86 kg) iv to be given. Will pulse dose. 24 hour VR ordered. Pharmacy will monitor and make adjustments when appropriate.     Debora Palmer, PharmD      "

## 2017-03-16 NOTE — ED PROVIDER NOTES
ED Provider Note    Scribed for Sherlyn Landrum M.D. by Venice Calvo. 3/15/2017, 9:44 PM.    Primary care provider: Zeb Burdick M.D.  Means of arrival: Wheel Chair   History obtained from: Patient   History limited by: None     CHIEF COMPLAINT  Chief Complaint   Patient presents with   • Leg Pain     COLEEN Christian is a 66 y.o. female who presents to the Emergency Department due to worsening pain to the left leg onset 4 to 6 weeks ago. Patient had orthopedic surgery on the left leg on 01/19/2017. Today, she went to [place] due to the severity of her surgical wound area pain, but she waited for 2 hours and decided to leave and go to [facility]. She then had fluid drained from the surgical wound area by [facility] physicians. She notes increased edema to the surgical wound area. Patient notes taking one dose of her 's oxycodone earlier today which temporarily alleviated her pain, states she does not have any prescribed pain medications. She has not taken any other pain medications today. She denies history of COPD or home oxygen use. Patient is not a smoker. She denies drug use. She denies fever.     REVIEW OF SYSTEMS  Pertinent positives include left leg pain, left leg edema. Pertinent negatives include no fever.  All other systems reviewed and negative. C     PAST MEDICAL HISTORY   has a past medical history of Hypertension; Diabetes; GERD (gastroesophageal reflux disease); Urinary incontinence with continuous leakage; Vulvar cancer (CMS-HCC); Depression (7/21/2015); Insomnia (7/21/2015); and Vitamin D deficiency (7/21/2015).    SURGICAL HISTORY   has past surgical history that includes abdominal exploration.    SOCIAL HISTORY  Social History   Substance Use Topics   • Smoking status: Never Smoker    • Smokeless tobacco: None   • Alcohol Use: No      History   Drug Use No       FAMILY HISTORY  Family History   Problem Relation Age of Onset   • Cancer Mother    • Heart  "Disease Father        CURRENT MEDICATIONS  Home Medications     Reviewed by Elaina Kimble R.N. (Registered Nurse) on 03/15/17 at 2125  Med List Status: Complete    Medication Last Dose Status    losartan (COZAAR) 50 MG Tab 3/15/2017 Active    omeprazole (PRILOSEC) 20 MG delayed-release capsule 3/15/2017 Active    oxybutynin (DITROPAN) 5 MG Tab 3/15/2017 Active    oxycodone-acetaminophen (PERCOCET-10)  MG Tab  Active    sertraline (ZOLOFT) 100 MG Tab 3/15/2017 Active    trazodone (DESYREL) 150 MG TABS 3/15/2017 Active                ALLERGIES  No Known Allergies    PHYSICAL EXAM  Vital Signs: /79 mmHg  Pulse 96  Temp(Src) 37.4 °C (99.3 °F) (Temporal)  Resp 16  Ht 1.702 m (5' 7\")  SpO2 94%  LMP 10/01/1990  Constitutional: Alert, no acute distress  HENT: Normocephalic, atraumatic, moist mucus membranes  Eyes: Pupils equal and reactive, normal conjunctiva, non-icteric  Neck: Supple, normal range of motion, no stridor  Cardiovascular: Regular rhythm, Normal peripheral perfusion, no cyanosis, Normal cardiac auscultation  Pulmonary: No respiratory distress, normal work of breathing, no accessory muscle usage, Clear to auscultation bilaterally  Abdomen: Soft, non tender, no peritoneal signs, bowel sounds are present.   Skin: Warm, dry, no rashes, no jaundice  Back: No pain with active range of motion  Musculoskeletal: Normal range of motion in all extremities. Well healing incision to lateral aspect of left leg extending from the knee to the mid thigh, small area of swelling without drainage at proximal aspect of the incision. 2+ DP pulse. No warmth, no redness. Full range of motion at the knee and hip.   Neurologic: Alert, appropriately interactive, poor historian, unable to remember the name of her orthopedic surgeon, is able to tell me the date of the procedure. She is oriented.  Psychiatric: Normal and appropriate mood and affect    DIAGNOSTIC STUDIES/PROCEDURES:    LABS  Labs Reviewed   CBC WITH " DIFFERENTIAL - Abnormal; Notable for the following:     WBC 15.1 (*)     Neutrophils-Polys 91.10 (*)     Lymphocytes 2.90 (*)     Neutrophils (Absolute) 13.71 (*)     Lymphs (Absolute) 0.44 (*)     Monos (Absolute) 0.87 (*)     All other components within normal limits   COMP METABOLIC PANEL - Abnormal; Notable for the following:     Sodium 134 (*)     Glucose 132 (*)     AST(SGOT) 330 (*)     ALT(SGPT) 280 (*)     Alkaline Phosphatase 152 (*)     Total Bilirubin 2.5 (*)     All other components within normal limits   ESTIMATED GFR - Abnormal; Notable for the following:     GFR If  52 (*)     GFR If Non  43 (*)     All other components within normal limits   ACETAMINOPHEN   BLOOD CULTURE   BLOOD CULTURE   LACTIC ACID   LACTIC ACID   All labs reviewed by me.    Radiology results revealed:   CT-EXTREMITY, LOWER WITH LEFT   Final Result      1.  Limited assessment of left lateral thigh fluid collection due to artifact secondary to hardware      2.  Fluid collection is at least 15 cm in length and probably subcutaneous in location      3.  Left hip arthroplasty, left knee arthroplasty, left femoral hardware present      LE VENOUS DUPLEX (Specify in Comments Left, Right Or Bilateral)   Final Result      US-LIVER AND BILIARY TREE    (Results Pending)        COURSE & MEDICAL DECISION MAKING  Pertinent Labs & Imaging studies reviewed. (See chart for details)    Review of old medical records for continuity of care. Medical records reviewed from [facility]. Patient had negative pharmacologic stress test performed 11/22/16. Admitted 1/19/17, discharged 1/20/17 for removal of hardware, left distal femur with revision prophylactic plate fixation of left femur.    Differential diagnoses include but are not limited to: Incisional fluid collection, abscess, postoperative pain, DVT    9:44 PM - Patient seen and examined at bedside. Patient will be treated with roxicodone 5 mg oral.  Ordered LE venous duplex, CT left lower extremity, estimated GFR, CBC with differential, and CMP to evaluate her symptoms.     Decision Making:  This is a 66 y.o. year old female who presents with left leg pain and swelling in the area of previous surgical incision. She does have a mildly elevated white blood count, no fevers, no tachycardia, no hypotension. CMP significant for elevated AST, ALT, alk phos and bilirubin. No previous values available for comparison.     Denied recent pain medication use with the exception of one oxycodone earlier today on my initial exam. She was given a single dose of immediate release oxycodone for pain on arrival to the emergency department. She remained comfortable on my reassessment with that medication.     Ultrasound ordered for evaluation, no evidence of DVT, fluid collection. Was evaluated by CT which demonstrated 15 cm subcutaneous fluid collection.     Case discussed with Dr. Montero, on-call for [facility] this evening. States that the patient is scheduled for incision and drainage with Dr. Irving in the morning. Plan is for admission to hospitalist service for operative intervention. Discussed the case with Dr. Singh who kindly agrees to admit the patient. Will order right upper quadrant ultrasound and Tylenol level to further evaluate transaminitis.    Admitting hospitalist in guarded condition    FINAL IMPRESSION  1. Fluid collection at surgical site, initial encounter          Venice JONES (Kerri), am scribing for, and in the presence of, Sherlyn Landrum M.D..    Electronically signed by: Venice Calvo (Kerri), 3/15/2017    Sherlyn JONES M.D. personally performed the services described in this documentation, as scribed by Venice Calvo in my presence, and it is both accurate and complete.    The note accurately reflects work and decisions made by me.  Sherlyn Landrum  3/16/2017  5:06 AM

## 2017-03-17 PROBLEM — R78.81 STAPHYLOCOCCUS AUREUS BACTEREMIA: Status: ACTIVE | Noted: 2017-03-17

## 2017-03-17 PROBLEM — A41.01 STAPHYLOCOCCUS AUREUS BACTEREMIA WITH SEPSIS (HCC): Status: ACTIVE | Noted: 2017-03-17

## 2017-03-17 PROBLEM — B95.61 STAPHYLOCOCCUS AUREUS BACTEREMIA: Status: ACTIVE | Noted: 2017-03-17

## 2017-03-17 LAB
ALBUMIN SERPL BCP-MCNC: 3.1 G/DL (ref 3.2–4.9)
ALBUMIN/GLOB SERPL: 1.4 G/DL
ALP SERPL-CCNC: 82 U/L (ref 30–99)
ALT SERPL-CCNC: 107 U/L (ref 2–50)
ANION GAP SERPL CALC-SCNC: 7 MMOL/L (ref 0–11.9)
AST SERPL-CCNC: 71 U/L (ref 12–45)
BASOPHILS # BLD AUTO: 0.2 % (ref 0–1.8)
BASOPHILS # BLD: 0.02 K/UL (ref 0–0.12)
BILIRUB SERPL-MCNC: 1.5 MG/DL (ref 0.1–1.5)
BUN SERPL-MCNC: 20 MG/DL (ref 8–22)
CALCIUM SERPL-MCNC: 8.5 MG/DL (ref 8.5–10.5)
CHLORIDE SERPL-SCNC: 104 MMOL/L (ref 96–112)
CO2 SERPL-SCNC: 23 MMOL/L (ref 20–33)
CREAT SERPL-MCNC: 0.98 MG/DL (ref 0.5–1.4)
EOSINOPHIL # BLD AUTO: 0 K/UL (ref 0–0.51)
EOSINOPHIL NFR BLD: 0 % (ref 0–6.9)
ERYTHROCYTE [DISTWIDTH] IN BLOOD BY AUTOMATED COUNT: 45.9 FL (ref 35.9–50)
GFR SERPL CREATININE-BSD FRML MDRD: 56 ML/MIN/1.73 M 2
GLOBULIN SER CALC-MCNC: 2.2 G/DL (ref 1.9–3.5)
GLUCOSE BLD-MCNC: 112 MG/DL (ref 65–99)
GLUCOSE SERPL-MCNC: 117 MG/DL (ref 65–99)
GRAM STN SPEC: NORMAL
HAV IGM SERPL QL IA: NEGATIVE
HBV CORE IGM SER QL: NEGATIVE
HBV SURFACE AG SER QL: NEGATIVE
HCT VFR BLD AUTO: 27.8 % (ref 37–47)
HCV AB SER QL: NEGATIVE
HGB BLD-MCNC: 9.3 G/DL (ref 12–16)
IMM GRANULOCYTES # BLD AUTO: 0.17 K/UL (ref 0–0.11)
IMM GRANULOCYTES NFR BLD AUTO: 2 % (ref 0–0.9)
LYMPHOCYTES # BLD AUTO: 0.36 K/UL (ref 1–4.8)
LYMPHOCYTES NFR BLD: 4.3 % (ref 22–41)
MCH RBC QN AUTO: 29.3 PG (ref 27–33)
MCHC RBC AUTO-ENTMCNC: 33.6 G/DL (ref 33.6–35)
MCV RBC AUTO: 87.3 FL (ref 81.4–97.8)
MONOCYTES # BLD AUTO: 0.43 K/UL (ref 0–0.85)
MONOCYTES NFR BLD AUTO: 5.1 % (ref 0–13.4)
NEUTROPHILS # BLD AUTO: 7.44 K/UL (ref 2–7.15)
NEUTROPHILS NFR BLD: 88.4 % (ref 44–72)
NRBC # BLD AUTO: 0 K/UL
NRBC BLD AUTO-RTO: 0 /100 WBC
PLATELET # BLD AUTO: 155 K/UL (ref 164–446)
PMV BLD AUTO: 10.3 FL (ref 9–12.9)
POTASSIUM SERPL-SCNC: 3.6 MMOL/L (ref 3.6–5.5)
PROT SERPL-MCNC: 5.3 G/DL (ref 6–8.2)
RBC # BLD AUTO: 3.14 M/UL (ref 4.2–5.4)
SIGNIFICANT IND 70042: NORMAL
SITE SITE: NORMAL
SODIUM SERPL-SCNC: 134 MMOL/L (ref 135–145)
SOURCE SOURCE: NORMAL
VANCOMYCIN SERPL-MCNC: 12.9 UG/ML
WBC # BLD AUTO: 8.4 K/UL (ref 4.8–10.8)

## 2017-03-17 PROCEDURE — A9270 NON-COVERED ITEM OR SERVICE: HCPCS | Performed by: INTERNAL MEDICINE

## 2017-03-17 PROCEDURE — 700102 HCHG RX REV CODE 250 W/ 637 OVERRIDE(OP): Performed by: NURSE PRACTITIONER

## 2017-03-17 PROCEDURE — A9270 NON-COVERED ITEM OR SERVICE: HCPCS | Performed by: NURSE PRACTITIONER

## 2017-03-17 PROCEDURE — 700105 HCHG RX REV CODE 258: Performed by: INTERNAL MEDICINE

## 2017-03-17 PROCEDURE — 51798 US URINE CAPACITY MEASURE: CPT

## 2017-03-17 PROCEDURE — 700102 HCHG RX REV CODE 250 W/ 637 OVERRIDE(OP): Performed by: INTERNAL MEDICINE

## 2017-03-17 PROCEDURE — 700111 HCHG RX REV CODE 636 W/ 250 OVERRIDE (IP): Performed by: ORTHOPAEDIC SURGERY

## 2017-03-17 PROCEDURE — 36415 COLL VENOUS BLD VENIPUNCTURE: CPT

## 2017-03-17 PROCEDURE — 700111 HCHG RX REV CODE 636 W/ 250 OVERRIDE (IP): Performed by: INTERNAL MEDICINE

## 2017-03-17 PROCEDURE — 700105 HCHG RX REV CODE 258

## 2017-03-17 PROCEDURE — 770006 HCHG ROOM/CARE - MED/SURG/GYN SEMI*

## 2017-03-17 PROCEDURE — 85025 COMPLETE CBC W/AUTO DIFF WBC: CPT

## 2017-03-17 PROCEDURE — 80053 COMPREHEN METABOLIC PANEL: CPT

## 2017-03-17 PROCEDURE — 99233 SBSQ HOSP IP/OBS HIGH 50: CPT | Performed by: INTERNAL MEDICINE

## 2017-03-17 PROCEDURE — 306481 GARMENT,FOOT IMPAD VASO: Performed by: ORTHOPAEDIC SURGERY

## 2017-03-17 PROCEDURE — 87040 BLOOD CULTURE FOR BACTERIA: CPT

## 2017-03-17 PROCEDURE — 80074 ACUTE HEPATITIS PANEL: CPT

## 2017-03-17 PROCEDURE — 82962 GLUCOSE BLOOD TEST: CPT | Mod: 91

## 2017-03-17 PROCEDURE — 80202 ASSAY OF VANCOMYCIN: CPT

## 2017-03-17 PROCEDURE — 700111 HCHG RX REV CODE 636 W/ 250 OVERRIDE (IP)

## 2017-03-17 RX ORDER — DEXTROSE MONOHYDRATE 25 G/50ML
25 INJECTION, SOLUTION INTRAVENOUS
Status: DISCONTINUED | OUTPATIENT
Start: 2017-03-17 | End: 2017-03-27 | Stop reason: HOSPADM

## 2017-03-17 RX ORDER — MORPHINE SULFATE 10 MG/ML
5 INJECTION, SOLUTION INTRAMUSCULAR; INTRAVENOUS
Status: DISCONTINUED | OUTPATIENT
Start: 2017-03-17 | End: 2017-03-17

## 2017-03-17 RX ORDER — OXYCODONE HYDROCHLORIDE 5 MG/1
5 TABLET ORAL EVERY 4 HOURS PRN
Status: DISCONTINUED | OUTPATIENT
Start: 2017-03-17 | End: 2017-03-27 | Stop reason: HOSPADM

## 2017-03-17 RX ORDER — OXYCODONE HYDROCHLORIDE 10 MG/1
10 TABLET ORAL EVERY 4 HOURS PRN
Status: DISCONTINUED | OUTPATIENT
Start: 2017-03-17 | End: 2017-03-27 | Stop reason: HOSPADM

## 2017-03-17 RX ORDER — SODIUM CHLORIDE 9 MG/ML
INJECTION, SOLUTION INTRAVENOUS CONTINUOUS
Status: DISCONTINUED | OUTPATIENT
Start: 2017-03-17 | End: 2017-03-17

## 2017-03-17 RX ORDER — METOCLOPRAMIDE HYDROCHLORIDE 5 MG/ML
10 INJECTION INTRAMUSCULAR; INTRAVENOUS
Status: DISCONTINUED | OUTPATIENT
Start: 2017-03-17 | End: 2017-03-17

## 2017-03-17 RX ADMIN — OMEPRAZOLE 20 MG: 20 CAPSULE, DELAYED RELEASE ORAL at 11:08

## 2017-03-17 RX ADMIN — MORPHINE SULFATE 4 MG: 4 INJECTION INTRAVENOUS at 06:29

## 2017-03-17 RX ADMIN — OXYCODONE HYDROCHLORIDE 10 MG: 10 TABLET ORAL at 15:08

## 2017-03-17 RX ADMIN — OXYCODONE HYDROCHLORIDE 10 MG: 10 TABLET ORAL at 19:37

## 2017-03-17 RX ADMIN — LOSARTAN POTASSIUM 50 MG: 50 TABLET, FILM COATED ORAL at 11:08

## 2017-03-17 RX ADMIN — MORPHINE SULFATE 4 MG: 4 INJECTION INTRAVENOUS at 04:05

## 2017-03-17 RX ADMIN — SERTRALINE 100 MG: 100 TABLET, FILM COATED ORAL at 11:07

## 2017-03-17 RX ADMIN — MORPHINE SULFATE 4 MG: 4 INJECTION INTRAVENOUS at 02:00

## 2017-03-17 RX ADMIN — OXYBUTYNIN CHLORIDE 10 MG: 5 TABLET ORAL at 19:38

## 2017-03-17 RX ADMIN — VANCOMYCIN HYDROCHLORIDE 1500 MG: 100 INJECTION, POWDER, LYOPHILIZED, FOR SOLUTION INTRAVENOUS at 11:09

## 2017-03-17 RX ADMIN — OXYCODONE HYDROCHLORIDE 10 MG: 10 TABLET ORAL at 11:08

## 2017-03-17 RX ADMIN — MORPHINE SULFATE 4 MG: 4 INJECTION INTRAVENOUS at 08:49

## 2017-03-17 RX ADMIN — PIPERACILLIN AND TAZOBACTAM 3.38 G: 3; .375 INJECTION, POWDER, LYOPHILIZED, FOR SOLUTION INTRAVENOUS; PARENTERAL at 05:11

## 2017-03-17 RX ADMIN — PIPERACILLIN AND TAZOBACTAM 3.38 G: 3; .375 INJECTION, POWDER, LYOPHILIZED, FOR SOLUTION INTRAVENOUS; PARENTERAL at 16:57

## 2017-03-17 RX ADMIN — PIPERACILLIN AND TAZOBACTAM 3.38 G: 3; .375 INJECTION, POWDER, LYOPHILIZED, FOR SOLUTION INTRAVENOUS; PARENTERAL at 14:48

## 2017-03-17 RX ADMIN — OXYCODONE HYDROCHLORIDE 10 MG: 10 TABLET ORAL at 05:11

## 2017-03-17 RX ADMIN — Medication 2 TABLET: at 11:09

## 2017-03-17 RX ADMIN — ENOXAPARIN SODIUM 40 MG: 100 INJECTION SUBCUTANEOUS at 16:56

## 2017-03-17 RX ADMIN — Medication 2 TABLET: at 19:38

## 2017-03-17 ASSESSMENT — ENCOUNTER SYMPTOMS
DIZZINESS: 0
NECK PAIN: 0
DEPRESSION: 0
HEADACHES: 0
MYALGIAS: 1
DOUBLE VISION: 0
ABDOMINAL PAIN: 0
LOSS OF CONSCIOUSNESS: 0
COUGH: 0
FEVER: 0

## 2017-03-17 ASSESSMENT — PAIN SCALES - WONG BAKER
WONGBAKER_NUMERICALRESPONSE: HURTS EVEN MORE
WONGBAKER_NUMERICALRESPONSE: HURTS EVEN MORE

## 2017-03-17 ASSESSMENT — PAIN SCALES - GENERAL
PAINLEVEL_OUTOF10: 4
PAINLEVEL_OUTOF10: 8
PAINLEVEL_OUTOF10: 9
PAINLEVEL_OUTOF10: 4
PAINLEVEL_OUTOF10: 9
PAINLEVEL_OUTOF10: 5

## 2017-03-17 NOTE — PROGRESS NOTES
"Pharmacy Kinetics 66 y.o. female on vancomycin day # 2 3/17/2017    Currently on Vancomycin 2200 mg IV loading dose    Indication for Treatment: L hip hardware infection and L knee septic arthritis    Pertinent history per medical record: Admitted on 3/15/2017 for fluid collection at surgical site. Pt presents to ED with c/o increasing pain and redness around surgical wound area previously drained. Pt has PMH significant for DM2, COPD, and HTN.    Other antibiotics: Zosyn 3.375 gm iv q6h    Allergies: Review of patient's allergies indicates no known allergies.     List concerns for renal function: age, CT with contrast 3/16, DM2, SIRS  -- elevated WBC    Pertinent cultures to date:    3/16/17 Bld cx:  pos, Staph  3/16/17 Wound cx: NGTD  3/16/17 Bone cx: NGTD    Recent Labs      03/15/17   2215  17   0210   WBC  15.1*  8.4   NEUTSPOLYS  91.10*  88.40*     Recent Labs      03/15/17   2215  17   0740  17   0210   BUN  19   --   20   CREATININE  1.24   --   0.98   ALBUMIN  4.4  4.3  3.1*     Recent Labs      17   0702   VANCORANDOM  12.9     Intake/Output Summary (Last 24 hours) at 17 1523  Last data filed at 17 0516   Gross per 24 hour   Intake   3120 ml   Output    430 ml   Net   2690 ml      Blood pressure 149/74, pulse 83, temperature 38 °C (100.4 °F), temperature source Temporal, resp. rate 18, height 1.702 m (5' 7\"), weight 86.183 kg (190 lb), last menstrual period 10/01/1990, SpO2 92 %, not currently breastfeeding. Temp (24hrs), Av.4 °C (99.4 °F), Min:36.5 °C (97.7 °F), Max:38.6 °C (101.4 °F)      A/P   1. Vancomycin dose change: initiate 1500 mg IV q24 hrs  2. Next vancomycin level: 2 days  3. Goal trough: 16-20 mcg/mL  4. Comments: Pt went to OR last PM for L thigh I&D and removal of hardware, drainage of L knee with removal of liner, and insertion of antibiotic beads to both sites. 24-hr level this AM = 12.9, in range for previous goal trough of 12-16 and 1500 mg " q24h started accordingly. However, goal trough updated to 16-20 with confirmed infection of hardware. Level this AM indicates patient will do well with 24-hr dosing, thus will increase dose to 1800 mg q24 hrs (21 mg/kg). Patient seen by Infectious Disease, vanco to continue for now. Pharmacy will follow cultures and plan to check a level prior to 3rd maintenance dose.    Tmo Alarcon, AnandD

## 2017-03-17 NOTE — CONSULTS
ADULT  INFECTIOUS DISEASES INPATIENT CONSULT NOTE     Date of Service: 03/17/17    Consult Requested By: David Sylvester M.D.    Reason for Consultation: Left hip hardware infection and left septic knee arthritis    History of Present Illness:   Shashank Christian is a 66 y.o. Woman with a history of bilateral TKA, vulvular cancer s/p chemoXRT and resection, and left leg surgery with Dr. Irving approximately 2 months ago admitted 3/15/2017 for worsening LLE infection. She states was doing well until recently when she developed increasing left leg pain associated with swelling and erythema. She denies any associated fevers or chills. She was seen by Dr. Irving who recommended she be evaluated in the ED. On presentation, she was afebrile with leukocytosis of 15.1. CT revealed a 15 cm fluid collection. She is now s/p left arthrocentesis of the left knee and hip, irrigation and debridement of left thigh with removal of hardware from left femur and arthrotomy and drainage of left knee with polyethylene liner removal and reimplantation with insertion of antibiotic impregnated calcium phosphate absorbable beads to left thigh and knee on 3/16/17. Gram stain is positive for gram positive cocci with cultures pending and blood cultures from 3/16 are now growing possible staph species. At this time, she continues to have pain from the surgical site. She denies any fevers, chills, diarrhea or dysuria. ID service was consulted for further management.    Review Of Systems:  ROS are negative except as noted above in the HPI.    PMH:   Past Medical History   Diagnosis Date   • Hypertension    • Diabetes    • GERD (gastroesophageal reflux disease)    • Urinary incontinence with continuous leakage    • Vulvar cancer (CMS-HCC)      had chemo and radiation and then surgery for recurrence, Dr. SOMMER   • Depression 7/21/2015   • Insomnia 7/21/2015   • Vitamin D deficiency 7/21/2015         PSH:  Past Surgical History   Procedure  Laterality Date   • Abdominal exploration     • Irrigation & debridement ortho Left 3/16/2017     Procedure: IRRIGATION & DEBRIDEMENT ORTHO-THIGH AND KNEE;  Surgeon: Rigo Irving M.D.;  Location: SURGERY [facility];  Service:    • Hardware removal ortho Left 3/16/2017     Procedure: HARDWARE REMOVAL ORTHO-THIGH;  Surgeon: Rigo Irving M.D.;  Location: SURGERY [facility];  Service:        FAMILY HX:  Family History   Problem Relation Age of Onset   • Cancer Mother    • Heart Disease Father        SOCIAL HX:  Social History     Social History   • Marital Status:      Spouse Name: N/A   • Number of Children: N/A   • Years of Education: N/A     Occupational History   • Not on file.     Social History Main Topics   • Smoking status: Never Smoker    • Smokeless tobacco: Not on file   • Alcohol Use: No   • Drug Use: No   • Sexual Activity: Not Currently     Other Topics Concern   • Not on file     Social History Narrative     History   Smoking status   • Never Smoker    Smokeless tobacco   • Not on file     History   Alcohol Use No       Allergies/Intolerances:  No Known Allergies    History reviewed with the patient    Other Current Medications:    Current facility-administered medications:   •  vancomycin 1,500 mg in  mL IVPB, 1,500 mg, Intravenous, Q24HR, Rigo Alarcon, PHARMD, Last Rate: 166.7 mL/hr at 03/17/17 1109, 1,500 mg at 03/17/17 1109  •  oxycodone immediate-release (ROXICODONE) tablet 5 mg, 5 mg, Oral, Q4HRS PRN **OR** oxycodone immediate release (ROXICODONE) tablet 10 mg, 10 mg, Oral, Q4HRS PRN, David Sylvester M.D., 10 mg at 03/17/17 1108  •  losartan (COZAAR) tablet 50 mg, 50 mg, Oral, DAILY, Coid Singh M.D., 50 mg at 03/17/17 1108  •  omeprazole (PRILOSEC) capsule 20 mg, 20 mg, Oral, DAILY, Codi Singh M.D., 20 mg at 03/17/17 1108  •  oxybutynin (DITROPAN) tablet 10 mg, 10 mg, Oral, QHS, Codi Singh M.D., 10 mg at 03/16/17 2100  •  sertraline (ZOLOFT)  "tablet 100 mg, 100 mg, Oral, DAILY, Codi Singh M.D., 100 mg at 17 1107  •  trazodone (DESYREL) tablet 150 mg, 150 mg, Oral, QHS PRN, Codi Singh M.D.  •  tramadol (ULTRAM) 50 MG tablet 50 mg, 50 mg, Oral, Q6HRS PRN, Codi Singh M.D.  •  senna-docusate (PERICOLACE or SENOKOT S) 8.6-50 MG per tablet 2 Tab, 2 Tab, Oral, BID, 2 Tab at 17 1109 **AND** polyethylene glycol/lytes (MIRALAX) PACKET 1 Packet, 1 Packet, Oral, QDAY PRN **AND** magnesium hydroxide (MILK OF MAGNESIA) suspension 30 mL, 30 mL, Oral, QDAY PRN **AND** bisacodyl (DULCOLAX) suppository 10 mg, 10 mg, Rectal, QDAY PRN, Codi Singh M.D.  •  ondansetron (ZOFRAN) syringe/vial injection 4 mg, 4 mg, Intravenous, Q4HRS PRN, Codi Singh M.D.  •  ondansetron (ZOFRAN ODT) dispertab 4 mg, 4 mg, Oral, Q4HRS PRN, Codi Singh M.D.  •  morphine (pf) 4 mg/ml injection 2-4 mg, 2-4 mg, Intravenous, Q2HRS PRN, Codi Singh M.D., 4 mg at 17 0849  •  MD ALERT... vancomycin per pharmacy protocol, , Other, PRN, Codi Singh M.D.  •  piperacillin-tazobactam (ZOSYN) 3.375 g in  mL IVPB, 3.375 g, Intravenous, Q6HRS, Codi Singh M.D., Stopped at 17 0541  •  NS infusion 2,586 mL, 30 mL/kg, Intravenous, Once PRN, Codi Singh M.D.  •  NS (BOLUS) infusion 500 mL, 500 mL, Intravenous, Once PRN, Codi Singh M.D., Stopped at 17 0846  [unfilled]    Most Recent Vital Signs:  /74 mmHg  Pulse 83  Temp(Src) 38 °C (100.4 °F) (Temporal)  Resp 18  Ht 1.702 m (5' 7\")  Wt 86.183 kg (190 lb)  BMI 29.75 kg/m2  SpO2 92%  LMP 10/01/1990  Breastfeeding? No  Temp  Av.5 °C (99.5 °F)  Min: 36.4 °C (97.6 °F)  Max: 38.6 °C (101.4 °F)    Physical Exam:  General: well-appearing, no acute distress  HEENT: sclera anicteric, PERRL, EOMI, MMM, no oral lesions  Neck: supple, no lymphadenopathy  Chest: CTAB, no r/r/w, normal work of breathing.  Cardiac: RRR, normal S1 S2, no m/r/g   Abdomen: + bowel sounds, " soft, non-tender, non-distended, no HSM  Extremities: R knee surgical scar well healed. LLE wrapped with 2 hemovacs  Skin: no rashes or worrisome lesions  Neuro: Alert and oriented times 3, non-focal exam    Pertinent Lab Results:  Recent Labs      03/15/17   2215  03/17/17   0210   WBC  15.1*  8.4      Recent Labs      03/15/17   2215  03/17/17   0210   HEMOGLOBIN  12.9  9.3*   HEMATOCRIT  37.7  27.8*   MCV  86.3  87.3   MCH  29.5  29.3   PLATELETCT  205  155*         Recent Labs      03/15/17   2215  03/17/17   0210   SODIUM  134*  134*   POTASSIUM  4.1  3.6   CHLORIDE  101  104   CO2  22  23   CREATININE  1.24  0.98        Recent Labs      03/15/17   2215  03/16/17   0740  03/17/17   0210   ALBUMIN  4.4  4.3  3.1*        Pertinent Micro:  Results     Procedure Component Value Units Date/Time    BLOOD CULTURE [[id number]]     Order Status:  Sent Specimen Information:  Blood from Peripheral     BLOOD CULTURE [[id number]]     Order Status:  Sent Specimen Information:  Blood from Peripheral     GRAM STAIN [[id number]] Collected:  03/16/17 1830    Order Status:  Completed Specimen Information:  Wound Updated:  03/17/17 0822     Significant Indicator .      Source WND      Site Left Knee Aspirate      Gram Stain Result --      Result:        Moderate WBCs.  Rare Gram positive cocci.      GRAM STAIN [[id number]] Collected:  03/16/17 1943    Order Status:  Completed Specimen Information:  Bone Updated:  03/17/17 0822     Significant Indicator .      Source BONE      Site Left Knee      Gram Stain Result --      Result:        Rare WBCs.  No organisms seen.      GRAM STAIN [[id number]] Collected:  03/16/17 1945    Order Status:  Completed Specimen Information:  Bone Updated:  03/17/17 0821     Significant Indicator .      Source BONE      Site Left Femur      Gram Stain Result --      Result:        Rare WBCs.  No organisms seen.      Blood Culture [[id number]]  (Abnormal) Collected:  03/16/17 0741    Order Status:  Completed  "Specimen Information:  Blood from Peripheral Updated:  03/17/17 0247     Significant Indicator POS (POS)      Source BLD      Site PERIPHERAL      Blood Culture -- (A)      Result:        Growth detected by Bactec instrument.  03/17/2017  02:46  Gram Stain: Gram positive cocci: Possible Staphylococcus sp.      Narrative:      Per Hospital Policy: Only change Specimen Src: to \"Line\" if  specified by physician order.    BLOOD CULTURE [[id number]] Collected:  03/17/17 0000    Order Status:  Canceled Specimen Information:  Other from Peripheral     Narrative:      ORDER WAS CANCELLED 03/17/2017 00:07, Duplicate . Drawn at 0740 on  03/16/2017 03/17/2017  00:08.  Per Hospital Policy: Only change Specimen Src: to \"Line\" if  specified by physician order.    BLOOD CULTURE [[id number]] Collected:  03/17/17 0000    Order Status:  Canceled Specimen Information:  Other from Peripheral     Narrative:      ORDER WAS CANCELLED 03/17/2017 00:08, Duplicate . Drawn at 0740 on  03/16/2017 03/17/2017  00:09.  Per Hospital Policy: Only change Specimen Src: to \"Line\" if  specified by physician order.    Blood Culture [[id number]]  (Abnormal) Collected:  03/16/17 0740    Order Status:  Completed Specimen Information:  Blood from Peripheral Updated:  03/16/17 2255     Significant Indicator POS (POS)      Source BLD      Site PERIPHERAL      Blood Culture -- (A)      Result:        Growth detected by Bactec instrument.  03/16/2017  22:51  Gram Stain: Gram positive cocci: Possible Staphylococcus sp.      Narrative:      CALL  [place]  131 tel. [phone], RB PERF. RESULTS CALLED TO: RN [id number]  CALLED  131 tel. [phone] 03/16/2017, 22:55, RB PERF. RESULTS CALLED TO: RN  [id number]  Per Hospital Policy: Only change Specimen Src: to \"Line\" if  specified by physician order.    CULTURE TISSUE W/ GRM STAIN [[id number]] Collected:  03/16/17 1943    Order Status:  Completed Specimen Information:  Other Updated:  03/16/17 2123    " ANAEROBIC CULTURE [[id number]] Collected:  03/16/17 1943    Order Status:  Completed Specimen Information:  Other Updated:  03/16/17 2123    ANAEROBIC CULTURE [[id number]] Collected:  03/16/17 1830    Order Status:  Completed Specimen Information:  Other Updated:  03/16/17 2121    CULTURE WOUND W/ GRAM STAIN [[id number]] Collected:  03/16/17 1830    Order Status:  Completed Specimen Information:  Other Updated:  03/16/17 2121    CULTURE TISSUE W/ GRM STAIN [[id number]] Collected:  03/16/17 1945    Order Status:  Completed Specimen Information:  Other Updated:  03/16/17 2119    ANAEROBIC CULTURE [[id number]] Collected:  03/16/17 1945    Order Status:  Completed Specimen Information:  Other Updated:  03/16/17 2119    Blood Culture [[id number]]     Order Status:  Canceled Specimen Information:  Blood from Peripheral     Blood Culture [[id number]]     Order Status:  Canceled Specimen Information:  Blood from Peripheral         BLOOD CULTURE   Date Value Ref Range Status   03/16/2017 *  Preliminary    Growth detected by Bactec instrument.  03/17/2017  02:46  Gram Stain: Gram positive cocci: Possible Staphylococcus sp.          Studies: CT leg leg - Fluid collection is at least 15 cm in length and probably subcutaneous in location    IMPRESSION:   1. Left femur hardware infection with abscess s/p removal  2. Left septic knee arthritis  3. Gram positive bacteremia, possible staph      PLAN:   Shashank Christian is a 66 y.o. Woman with a history of bilateral TKA, left femur hardware placement approximately two months ago and h/o vulvar cancer admitted for pain, erythema and swelling of the left thigh found to have a fluid collection on CT. She is now s/p I&D of the left thigh with hardware removal and arthrotomy and drainage of the left knee with liner exchange. Clinical presentation is consistent with hardware infection and she will require a 6 week course of IV abx. Gram stain is positive for GPC with cultures pending. She  also has a positive blood culture c/f staph. OK with current abx for now. Further recommendations per clinical course and culture results.      Discussed with IM. Will continue to follow    Teagan Rosas M.D.

## 2017-03-17 NOTE — CARE PLAN
Problem: Pain Management  Goal: Pain level will decrease to patient’s comfort goal  Outcome: PROGRESSING SLOWER THAN EXPECTED

## 2017-03-17 NOTE — CARE PLAN
Problem: Safety  Goal: Will remain free from falls  Outcome: PROGRESSING AS EXPECTED  Intervention: Implement fall precautions  Bed in low position, wheels locked, call light within reach, hourly rounding in place.      Problem: Pain Management  Goal: Pain level will decrease to patient’s comfort goal  Outcome: PROGRESSING AS EXPECTED  Pain controlled with Oxycodone 10mg Q6 hours and Morphine IV 4mg Q2 hours.

## 2017-03-17 NOTE — PROGRESS NOTES
No c/o  SCD on right, no foot pump on left  Knee immobilizer in place  + DF/PF  Blood cx - probably staph  Fluid/tissue cxs - pending  HV - 20 and 10    Plan:    ABX  DVT proph with foot pumps, lovenox  D/c drains in AM  Repeat I&D and liner exchange Sunday or Tuesday  Discussed operative findings with patient

## 2017-03-17 NOTE — PROGRESS NOTES
Hospital Medicine Progress Note, Adult, Complex               Author: David Sylvester Date & Time created: 3/16/2017  5:20 PM     Interval History:  65 y/o female with LLE fluid collection    LLE fluid collection-c/f for infection, possible spread from dental caries? Chantal saw, planning for I&D later today. Patient claims it is very painful and tender, about 8/10, but some of the pain meds do here help.    Elevated LFTS-downtrending. No abdominal pain. No pain, N/V with consumption of food. Down-trending now.    Review of Systems:  Review of Systems   Constitutional: Negative for fever.   Eyes: Negative.  Negative for blurred vision.   Respiratory: Negative for cough.    Cardiovascular: Negative for chest pain and leg swelling.   Gastrointestinal: Negative for nausea, vomiting and abdominal pain.   Genitourinary: Negative for dysuria.   Musculoskeletal: Positive for myalgias and joint pain. Negative for neck pain.   Skin: Negative for itching.   Neurological: Negative for dizziness, loss of consciousness and headaches.   Psychiatric/Behavioral: Negative for depression.   All other systems reviewed and are negative.      Physical Exam:  Physical Exam   Constitutional: She is oriented to person, place, and time. She appears well-developed and well-nourished. No distress.   Tardive dyskinetic movements   HENT:   Head: Normocephalic and atraumatic.   Mouth/Throat: No oropharyngeal exudate.   Very poor dentition   Eyes: Pupils are equal, round, and reactive to light. No scleral icterus.   Neck: Normal range of motion. Neck supple. No thyromegaly present.   Cardiovascular: Normal rate, regular rhythm, normal heart sounds and intact distal pulses.    No murmur heard.  Pulmonary/Chest: Effort normal and breath sounds normal. No respiratory distress. She has no wheezes.   Abdominal: Soft. Bowel sounds are normal. There is no tenderness.   Musculoskeletal: Normal range of motion. She exhibits edema (palpable fluid collection  along the lateral aspect of the mid LLE thigh, moderately tender, no secretions appreciate) and tenderness.   Neurological: She is alert and oriented to person, place, and time. No cranial nerve deficit.   Skin: Skin is warm and dry. No rash noted.   Psychiatric: She has a normal mood and affect.   Nursing note and vitals reviewed.      Labs:        Invalid input(s): [id number]      Recent Labs      03/15/17   2215   SODIUM  134*   POTASSIUM  4.1   CHLORIDE  101   CO2  22   BUN  19   CREATININE  1.24   CALCIUM  9.0     Recent Labs      03/15/17   2215  03/16/17   0740   ALTSGPT  280*  206*   ASTSGOT  330*  192*   ALKPHOSPHAT  152*  125*   TBILIRUBIN  2.5*  2.4*   DBILIRUBIN   --   0.9*   GLUCOSE  132*   --      Recent Labs      03/15/17   2215   RBC  4.37   HEMOGLOBIN  12.9   HEMATOCRIT  37.7   PLATELETCT  205     Recent Labs      03/15/17   2215  03/16/17   0740   WBC  15.1*   --    NEUTSPOLYS  91.10*   --    LYMPHOCYTES  2.90*   --    MONOCYTES  5.80   --    EOSINOPHILS  0.10   --    BASOPHILS  0.10   --    ASTSGOT  330*  192*   ALTSGPT  280*  206*   ALKPHOSPHAT  152*  125*   TBILIRUBIN  2.5*  2.4*           Hemodynamics:  Temp (24hrs), Av.6 °C (99.6 °F), Min:36.4 °C (97.6 °F), Max:38.2 °C (100.8 °F)  Temperature: 37.4 °C (99.4 °F)  Pulse  Av.6  Min: 84  Max: 96Heart Rate (Monitored): 89  Blood Pressure : 127/74 mmHg, NIBP: 141/68 mmHg     Respiratory:    Respiration: 18, Pulse Oximetry: 93 %           Fluids:  No intake or output data in the 24 hours ending 17 1720  Weight: 86.183 kg (190 lb)  GI/Nutrition:  Orders Placed This Encounter   Procedures   • Diet NPO     Standing Status: Standing      Number of Occurrences: 1      Standing Expiration Date:      Order Specific Question:  Restrict to:     Answer:  Strict [1]     Medical Decision Making, by Problem:  Active Hospital Problems    Diagnosis   • *Fluid collection at surgical site [T88.8XXA]-c/f infection  -Ortho to take for I&D  -continue  "IV vancomycin and zosyn, goal trough is 15-20, check daily, adjust PRN  -F/U OR cultures, adjust PRN, might need to get ID involved   • Fatty liver disease, nonalcoholic [K76.0]-encourage weight loss   • Dental caries [K02.9]-recent teeth extraction, monitor   • Common bile duct dilatation [K83.8]-unclear significance, no belly pain, LFT\"s down-trending, monitor   • Elevated LFTs [R79.89]-2/2 ishcemia, versus toxicity, APAP levels are ok, down-trending   • Depression [F32.9]-outpatient treatment, zoloft   • Vitamin D deficiency [E55.9]-replacement   • Diabetes mellitus type II, controlled (CMS-HCC) [E11.9]-monitor surgars closely  -ISS, adjust PRN   • HTN (hypertension) [I10]-well controlled, continue current meds   • HLD (hyperlipidemia) [E78.5]-statin   • GERD (gastroesophageal reflux disease) [K21.9]-PPI   • Urinary incontinence with continuous leakage [N39.45]-ditropan       Labs reviewed and Medications reviewed  Shannon catheter: No Shannon        DVT prophylaxis - mechanical: SCDs      Assessed for rehab: Patient was assess for and/or received rehabilitation services during this hospitalization        "

## 2017-03-17 NOTE — PROGRESS NOTES
Blood culture results are positive for gram positive cocci.  Hospitalist notified and made aware of result and patient already being on Zosyn.  Order received for additional blood culture x2.

## 2017-03-17 NOTE — PROGRESS NOTES
Hospital Medicine Progress Note, Adult, Complex               Author: David Sylvester Date & Time created: 3/17/2017  4:03 PM     Interval History:  65 y/o female with LLE fluid collection    LLE fluid collection-s/p arthrotomy, hardware removal and arthrotomy and drainage of the left knee with liner exchange. Pain is quite intense today. Blood cultures growing staph as well. Consulted ID. Get ECHO.     Elevated LFTS-continue to down-trend. No abdominal pain.     Review of Systems:  Review of Systems   Constitutional: Positive for malaise/fatigue. Negative for fever.   Eyes: Negative for double vision.   Respiratory: Negative for cough.    Cardiovascular: Negative for chest pain and leg swelling.   Gastrointestinal: Negative for abdominal pain.   Genitourinary: Negative for dysuria.   Musculoskeletal: Positive for myalgias and joint pain. Negative for neck pain.        Increased pain today   Skin: Negative for itching.   Neurological: Negative for dizziness, loss of consciousness and headaches.   Psychiatric/Behavioral: Negative for depression.   All other systems reviewed and are negative.      Physical Exam:  Physical Exam   Constitutional: She is oriented to person, place, and time. She appears well-developed and well-nourished. No distress.   Tardive dyskinetic movements   HENT:   Head: Normocephalic and atraumatic.   Mouth/Throat: No oropharyngeal exudate.   Very poor dentition   Eyes: Pupils are equal, round, and reactive to light. Right eye exhibits no discharge. Left eye exhibits no discharge.   Neck: Normal range of motion. Neck supple.   Cardiovascular: Normal rate, regular rhythm, normal heart sounds and intact distal pulses.    No murmur heard.  Pulmonary/Chest: Effort normal and breath sounds normal. No stridor. No respiratory distress.   Abdominal: Soft. Bowel sounds are normal. There is no tenderness.   Musculoskeletal: Normal range of motion. She exhibits edema (2 hemovac drains in place,  serosanguinous fluid present) and tenderness.   Neurological: She is alert and oriented to person, place, and time. No cranial nerve deficit.   Skin: Skin is warm and dry. No rash noted.   Psychiatric: She has a normal mood and affect.   Nursing note and vitals reviewed.      Labs:        Invalid input(s): [id number]      Recent Labs      03/15/17   2215  03/17/17   0210   SODIUM  134*  134*   POTASSIUM  4.1  3.6   CHLORIDE  101  104   CO2  22  23   BUN  19  20   CREATININE  1.24  0.98   CALCIUM  9.0  8.5     Recent Labs      03/15/17   2215  03/16/17   0740  17   0210   ALTSGPT  280*  206*  107*   ASTSGOT  330*  192*  71*   ALKPHOSPHAT  152*  125*  82   TBILIRUBIN  2.5*  2.4*  1.5   DBILIRUBIN   --   0.9*   --    GLUCOSE  132*   --   117*     Recent Labs      03/15/17   2215  03/17/17   0210   RBC  4.37  3.14*   HEMOGLOBIN  12.9  9.3*   HEMATOCRIT  37.7  27.8*   PLATELETCT  205  155*     Recent Labs      03/15/17   2215  03/16/17   0740  17   0210   WBC  15.1*   --   8.4   NEUTSPOLYS  91.10*   --   88.40*   LYMPHOCYTES  2.90*   --   4.30*   MONOCYTES  5.80   --   5.10   EOSINOPHILS  0.10   --   0.00   BASOPHILS  0.10   --   0.20   ASTSGOT  330*  192*  71*   ALTSGPT  280*  206*  107*   ALKPHOSPHAT  152*  125*  82   TBILIRUBIN  2.5*  2.4*  1.5           Hemodynamics:  Temp (24hrs), Av.4 °C (99.4 °F), Min:36.5 °C (97.7 °F), Max:38.6 °C (101.4 °F)  Temperature: 38 °C (100.4 °F)  Pulse  Av.8  Min: 66  Max: 96Heart Rate (Monitored): 82  Blood Pressure : 149/74 mmHg, NIBP: 130/62 mmHg     Respiratory:    Respiration: 18, Pulse Oximetry: 92 %           Fluids:    Intake/Output Summary (Last 24 hours) at 17 1603  Last data filed at 17 0516   Gross per 24 hour   Intake   3120 ml   Output    430 ml   Net   2690 ml        GI/Nutrition:  Orders Placed This Encounter   Procedures   • DIET ORDER     Standing Status: Standing      Number of Occurrences: 1      Standing Expiration Date:       "Order Specific Question:  Diet:     Answer:  Diabetic [3]     Medical Decision Making, by Problem:  Active Hospital Problems    Diagnosis   • *Fluid collection at surgical site [T88.8XXA]-c/f infection  -s/p hardware removal and arthrotomy and drainage of the left knee with liner exchange and I&D  -continue IV vancomycin and zosyn, goal trough is 15-20, check daily, adjust PRN  -ID consulted  -F/U OR cultures, adjust PRN   • Fatty liver disease, nonalcoholic [K76.0]-encourage weight loss   • Dental caries [K02.9]-recent teeth extraction, monitor   • Common bile duct dilatation [K83.8]-unclear significance, no belly pain, LFT\"s down-trending, monitor   • Elevated LFTs [R79.89]-2/2 ishcemia, versus toxicity, APAP levels are ok, down-trending continues   • Depression [F32.9]-outpatient treatment, zoloft   • Vitamin D deficiency [E55.9]-replacement   • Diabetes mellitus type II, controlled (CMS-HCC) [E11.9]-monitor surgars closely  -ISS, adjust PRN   • HTN (hypertension) [I10]-well controlled, continue current meds   • HLD (hyperlipidemia) [E78.5]-statin   • GERD (gastroesophageal reflux disease) [K21.9]-PPI   • Urinary incontinence with continuous leakage [N39.45]-ditropan     Staph bacteremia-all 4/4, likely from LLE  -presumed MRSA at this time  -ID consulted  -continue IV vancomycin and zosyn as outlined above  -repeat BCXs  -ECHO    Labs reviewed and Medications reviewed  Shannon catheter: No Shannon        DVT prophylaxis - mechanical: SCDs      Assessed for rehab: Patient was assess for and/or received rehabilitation services during this hospitalization        "

## 2017-03-17 NOTE — PROGRESS NOTES
Developed pain/swelling left thigh yesterday morning  2 weeks ago had dental extraction for abscess  Did not fill antibiotic prescription from dentist  Has lateral thigh abscess  ? Extension to left TKA  Plan I&D and lateral plate removal, aspiration of hip/knee, possible I&D/liner exchange at knee

## 2017-03-17 NOTE — OR SURGEON
Immediate Post-Operative Note      PreOp Diagnosis:     1. Left thigh abscess  2. Septic arthritis, left TKA    PostOp Diagnosis: Same    Procedure(s):  IRRIGATION & DEBRIDEMENT ORTHO-THIGH AND KNEE - Wound Class: Contaminated  HARDWARE REMOVAL ORTHO-THIGH - Wound Class: Contaminated    Surgeon(s):  Rigo Irving M.D.    Anesthesiologist/Type of Anesthesia:  Anesthesiologist: Jasen Bey M.D./General    Surgical Staff:  Circulator: Janelle Alonzo R.N.  Relief Circulator: Clare Castro  Scrub Person: Dottie Poole    Specimen:     1. Fluid from knee for culture  2. Tissue along femur for culture  3. Tissue at knee for culture    Estimated Blood Loss: 400 cc    Findings: See dictation    Complications: None        3/16/2017 8:15 PM Rigo Irving

## 2017-03-17 NOTE — PROGRESS NOTES
Patient up to floor from PACU at 2135  A&O*4  Vital signs stable  Pain to LLE 6/10  LLE CMS intact  2 RN skin check completed with Raz RN, no skin breakdown noted

## 2017-03-17 NOTE — CARE PLAN
Problem: Mobility  Goal: Risk for activity intolerance will decrease  Outcome: PROGRESSING SLOWER THAN EXPECTED  Refusing to get out of bed d/t pain

## 2017-03-18 LAB
ALBUMIN SERPL BCP-MCNC: 3.4 G/DL (ref 3.2–4.9)
ALBUMIN/GLOB SERPL: 1.1 G/DL
ALP SERPL-CCNC: 90 U/L (ref 30–99)
ALT SERPL-CCNC: 65 U/L (ref 2–50)
ANION GAP SERPL CALC-SCNC: 11 MMOL/L (ref 0–11.9)
AST SERPL-CCNC: 43 U/L (ref 12–45)
BILIRUB SERPL-MCNC: 1.4 MG/DL (ref 0.1–1.5)
BUN SERPL-MCNC: 14 MG/DL (ref 8–22)
CALCIUM SERPL-MCNC: 9.6 MG/DL (ref 8.5–10.5)
CHLORIDE SERPL-SCNC: 99 MMOL/L (ref 96–112)
CO2 SERPL-SCNC: 21 MMOL/L (ref 20–33)
CREAT SERPL-MCNC: 0.96 MG/DL (ref 0.5–1.4)
CRP SERPL HS-MCNC: 22.71 MG/DL (ref 0–0.75)
ERYTHROCYTE [DISTWIDTH] IN BLOOD BY AUTOMATED COUNT: 44.4 FL (ref 35.9–50)
ERYTHROCYTE [SEDIMENTATION RATE] IN BLOOD BY WESTERGREN METHOD: 100 MM/HOUR (ref 0–30)
GFR SERPL CREATININE-BSD FRML MDRD: 58 ML/MIN/1.73 M 2
GLOBULIN SER CALC-MCNC: 3 G/DL (ref 1.9–3.5)
GLUCOSE BLD-MCNC: 109 MG/DL (ref 65–99)
GLUCOSE BLD-MCNC: 115 MG/DL (ref 65–99)
GLUCOSE BLD-MCNC: 118 MG/DL (ref 65–99)
GLUCOSE BLD-MCNC: 138 MG/DL (ref 65–99)
GLUCOSE SERPL-MCNC: 116 MG/DL (ref 65–99)
HCT VFR BLD AUTO: 27.3 % (ref 37–47)
HGB BLD-MCNC: 9.2 G/DL (ref 12–16)
LV EJECT FRACT  99904: 60
LV EJECT FRACT MOD 2C 99903: 59.81
LV EJECT FRACT MOD 4C 99902: 59.47
LV EJECT FRACT MOD BP 99901: 57.74
MCH RBC QN AUTO: 29.1 PG (ref 27–33)
MCHC RBC AUTO-ENTMCNC: 33.7 G/DL (ref 33.6–35)
MCV RBC AUTO: 86.4 FL (ref 81.4–97.8)
PLATELET # BLD AUTO: 189 K/UL (ref 164–446)
PMV BLD AUTO: 10.5 FL (ref 9–12.9)
POTASSIUM SERPL-SCNC: 3.4 MMOL/L (ref 3.6–5.5)
PROT SERPL-MCNC: 6.4 G/DL (ref 6–8.2)
RBC # BLD AUTO: 3.16 M/UL (ref 4.2–5.4)
SODIUM SERPL-SCNC: 131 MMOL/L (ref 135–145)
WBC # BLD AUTO: 7.2 K/UL (ref 4.8–10.8)

## 2017-03-18 PROCEDURE — 700111 HCHG RX REV CODE 636 W/ 250 OVERRIDE (IP): Performed by: ORTHOPAEDIC SURGERY

## 2017-03-18 PROCEDURE — 80053 COMPREHEN METABOLIC PANEL: CPT

## 2017-03-18 PROCEDURE — 700111 HCHG RX REV CODE 636 W/ 250 OVERRIDE (IP): Performed by: INTERNAL MEDICINE

## 2017-03-18 PROCEDURE — A9270 NON-COVERED ITEM OR SERVICE: HCPCS | Performed by: INTERNAL MEDICINE

## 2017-03-18 PROCEDURE — 770006 HCHG ROOM/CARE - MED/SURG/GYN SEMI*

## 2017-03-18 PROCEDURE — G8978 MOBILITY CURRENT STATUS: HCPCS | Mod: CK

## 2017-03-18 PROCEDURE — 99232 SBSQ HOSP IP/OBS MODERATE 35: CPT | Performed by: INTERNAL MEDICINE

## 2017-03-18 PROCEDURE — G8979 MOBILITY GOAL STATUS: HCPCS | Mod: CI

## 2017-03-18 PROCEDURE — 85027 COMPLETE CBC AUTOMATED: CPT

## 2017-03-18 PROCEDURE — 700105 HCHG RX REV CODE 258

## 2017-03-18 PROCEDURE — 306481 GARMENT,FOOT IMPAD VASO: Performed by: INTERNAL MEDICINE

## 2017-03-18 PROCEDURE — 85652 RBC SED RATE AUTOMATED: CPT

## 2017-03-18 PROCEDURE — 82962 GLUCOSE BLOOD TEST: CPT | Mod: 91

## 2017-03-18 PROCEDURE — 86140 C-REACTIVE PROTEIN: CPT

## 2017-03-18 PROCEDURE — 97166 OT EVAL MOD COMPLEX 45 MIN: CPT

## 2017-03-18 PROCEDURE — 93306 TTE W/DOPPLER COMPLETE: CPT | Mod: 26 | Performed by: INTERNAL MEDICINE

## 2017-03-18 PROCEDURE — 93306 TTE W/DOPPLER COMPLETE: CPT

## 2017-03-18 PROCEDURE — 700105 HCHG RX REV CODE 258: Performed by: INTERNAL MEDICINE

## 2017-03-18 PROCEDURE — G8988 SELF CARE GOAL STATUS: HCPCS | Mod: CI

## 2017-03-18 PROCEDURE — 700111 HCHG RX REV CODE 636 W/ 250 OVERRIDE (IP)

## 2017-03-18 PROCEDURE — G8987 SELF CARE CURRENT STATUS: HCPCS | Mod: CJ

## 2017-03-18 PROCEDURE — 700102 HCHG RX REV CODE 250 W/ 637 OVERRIDE(OP): Performed by: INTERNAL MEDICINE

## 2017-03-18 PROCEDURE — 36415 COLL VENOUS BLD VENIPUNCTURE: CPT

## 2017-03-18 PROCEDURE — 97162 PT EVAL MOD COMPLEX 30 MIN: CPT

## 2017-03-18 RX ORDER — CEFAZOLIN SODIUM 2 G/100ML
2 INJECTION, SOLUTION INTRAVENOUS EVERY 8 HOURS
Status: DISCONTINUED | OUTPATIENT
Start: 2017-03-18 | End: 2017-03-27 | Stop reason: HOSPADM

## 2017-03-18 RX ADMIN — CEFAZOLIN SODIUM 2 G: 2 INJECTION, SOLUTION INTRAVENOUS at 20:02

## 2017-03-18 RX ADMIN — PIPERACILLIN AND TAZOBACTAM 3.38 G: 3; .375 INJECTION, POWDER, LYOPHILIZED, FOR SOLUTION INTRAVENOUS; PARENTERAL at 11:46

## 2017-03-18 RX ADMIN — SERTRALINE 100 MG: 100 TABLET, FILM COATED ORAL at 07:23

## 2017-03-18 RX ADMIN — OXYBUTYNIN CHLORIDE 10 MG: 5 TABLET ORAL at 19:58

## 2017-03-18 RX ADMIN — OMEPRAZOLE 20 MG: 20 CAPSULE, DELAYED RELEASE ORAL at 07:23

## 2017-03-18 RX ADMIN — PIPERACILLIN AND TAZOBACTAM 3.38 G: 3; .375 INJECTION, POWDER, LYOPHILIZED, FOR SOLUTION INTRAVENOUS; PARENTERAL at 00:00

## 2017-03-18 RX ADMIN — Medication 2 TABLET: at 07:23

## 2017-03-18 RX ADMIN — LOSARTAN POTASSIUM 50 MG: 50 TABLET, FILM COATED ORAL at 07:23

## 2017-03-18 RX ADMIN — ENOXAPARIN SODIUM 40 MG: 100 INJECTION SUBCUTANEOUS at 07:23

## 2017-03-18 RX ADMIN — OXYCODONE HYDROCHLORIDE 10 MG: 10 TABLET ORAL at 07:24

## 2017-03-18 RX ADMIN — PIPERACILLIN AND TAZOBACTAM 3.38 G: 3; .375 INJECTION, POWDER, LYOPHILIZED, FOR SOLUTION INTRAVENOUS; PARENTERAL at 06:27

## 2017-03-18 RX ADMIN — OXYCODONE HYDROCHLORIDE 10 MG: 10 TABLET ORAL at 19:58

## 2017-03-18 RX ADMIN — CEFAZOLIN SODIUM 2 G: 2 INJECTION, SOLUTION INTRAVENOUS at 14:25

## 2017-03-18 RX ADMIN — Medication 2 TABLET: at 19:58

## 2017-03-18 RX ADMIN — VANCOMYCIN HYDROCHLORIDE 1800 MG: 100 INJECTION, POWDER, LYOPHILIZED, FOR SOLUTION INTRAVENOUS at 07:23

## 2017-03-18 ASSESSMENT — PAIN SCALES - GENERAL: PAINLEVEL_OUTOF10: 6

## 2017-03-18 ASSESSMENT — ENCOUNTER SYMPTOMS
VOMITING: 0
NECK PAIN: 0
DIZZINESS: 0
CHILLS: 0
DIARRHEA: 0
ABDOMINAL PAIN: 0
COUGH: 0
HEADACHES: 0
SHORTNESS OF BREATH: 0
BLURRED VISION: 0
LOSS OF CONSCIOUSNESS: 0
DEPRESSION: 0
MYALGIAS: 1
NAUSEA: 0
FEVER: 0

## 2017-03-18 ASSESSMENT — GAIT ASSESSMENTS
DISTANCE (FEET): 12
GAIT LEVEL OF ASSIST: STAND BY ASSIST
ASSISTIVE DEVICE: FRONT WHEEL WALKER

## 2017-03-18 ASSESSMENT — PAIN SCALES - WONG BAKER
WONGBAKER_NUMERICALRESPONSE: HURTS EVEN MORE
WONGBAKER_NUMERICALRESPONSE: HURTS EVEN MORE

## 2017-03-18 ASSESSMENT — ACTIVITIES OF DAILY LIVING (ADL): TOILETING: INDEPENDENT

## 2017-03-18 NOTE — PROGRESS NOTES
Pt received bed bath brushed teeth and washed hair. Pt has red skin breakdown on bottom, cream was applied and nurse was notified. Excoriation is seen in the vaginal area, barrier cream was applied, nurse notified. Skin breakdown was also seen in folds of breast, skin applied, nurse notified.

## 2017-03-18 NOTE — PROGRESS NOTES
Hospital Medicine Progress Note, Adult, Complex               Author: David Sylvester Date & Time created: 3/18/2017  2:18 PM     Interval History:  65 y/o female with LLE fluid collection    LLE fluid collection-s/p arthrotomy, hardware removal and arthrotomy and drainage of the left knee with liner exchange POD#1. Removed one of the drains today, mild bleeding afterwards, but stopped. Continued pain. MSSA in the cultures, so changing abx's today.      Elevated LFTS-resolved, no abdominal pain. Eating food ok.     Review of Systems:  Review of Systems   Constitutional: Positive for malaise/fatigue (better today). Negative for fever.   Eyes: Negative for blurred vision.   Respiratory: Negative for cough.    Cardiovascular: Negative for chest pain and leg swelling.   Gastrointestinal: Negative for nausea, vomiting and abdominal pain.   Genitourinary: Negative for dysuria.   Musculoskeletal: Positive for myalgias and joint pain. Negative for neck pain.        About the same amount of pain today   Skin: Negative for itching.   Neurological: Negative for dizziness, loss of consciousness and headaches.   Psychiatric/Behavioral: Negative for depression.   All other systems reviewed and are negative.      Physical Exam:  Physical Exam   Constitutional: She is oriented to person, place, and time. She appears well-developed and well-nourished. No distress.   Tardive dyskinetic movements, less pronounced today   HENT:   Head: Normocephalic and atraumatic.   Mouth/Throat: No oropharyngeal exudate.   Very poor dentition   Eyes: Pupils are equal, round, and reactive to light. No scleral icterus.   Neck: Normal range of motion. Neck supple.   Cardiovascular: Normal rate, regular rhythm, normal heart sounds and intact distal pulses.    No murmur heard.  Pulmonary/Chest: Effort normal and breath sounds normal. No stridor. She has no wheezes. She has no rales.   Abdominal: Soft. Bowel sounds are normal. There is no tenderness.    Musculoskeletal: Normal range of motion. She exhibits edema and tenderness.   Multiple surgical scars, one drain is out and the other with scant fluid, edema, moderate TTP, warm peripherally   Neurological: She is alert and oriented to person, place, and time. No cranial nerve deficit.   Skin: Skin is warm and dry. No rash noted.   Psychiatric: She has a normal mood and affect.   Nursing note and vitals reviewed.      Labs:        Invalid input(s): [id number]      Recent Labs      03/15/17   2215  03/17/17   0210  03/18/17   0312   SODIUM  134*  134*  131*   POTASSIUM  4.1  3.6  3.4*   CHLORIDE  101  104  99   CO2  22  23  21   BUN  19  20  14   CREATININE  1.24  0.98  0.96   CALCIUM  9.0  8.5  9.6     Recent Labs      03/15/17   2215  03/16/17   0740  03/17/17   0210  03/18/17   0312   ALTSGPT  280*  206*  107*  65*   ASTSGOT  330*  192*  71*  43   ALKPHOSPHAT  152*  125*  82  90   TBILIRUBIN  2.5*  2.4*  1.5  1.4   DBILIRUBIN   --   0.9*   --    --    GLUCOSE  132*   --   117*  116*     Recent Labs      03/15/17   2215  03/17/17   0210  03/18/17   0312   RBC  4.37  3.14*  3.16*   HEMOGLOBIN  12.9  9.3*  9.2*   HEMATOCRIT  37.7  27.8*  27.3*   PLATELETCT  205  155*  189     Recent Labs      03/15/17   2215  03/16/17   0740  03/17/17   0210  03/18/17   0312   WBC  15.1*   --   8.4  7.2   NEUTSPOLYS  91.10*   --   88.40*   --    LYMPHOCYTES  2.90*   --   4.30*   --    MONOCYTES  5.80   --   5.10   --    EOSINOPHILS  0.10   --   0.00   --    BASOPHILS  0.10   --   0.20   --    ASTSGOT  330*  192*  71*  43   ALTSGPT  280*  206*  107*  65*   ALKPHOSPHAT  152*  125*  82  90   TBILIRUBIN  2.5*  2.4*  1.5  1.4           Hemodynamics:  Temp (24hrs), Av.1 °C (98.8 °F), Min:36.7 °C (98.1 °F), Max:37.6 °C (99.6 °F)  Temperature: 36.7 °C (98.1 °F)  Pulse  Av.2  Min: 66  Max: 97   Blood Pressure : 102/53 mmHg     Respiratory:    Respiration: 18, Pulse Oximetry: 98 %           Fluids:    Intake/Output Summary (Last  "24 hours) at 03/18/17 1418  Last data filed at 03/18/17 0400   Gross per 24 hour   Intake      0 ml   Output     90 ml   Net    -90 ml        GI/Nutrition:  Orders Placed This Encounter   Procedures   • DIET ORDER     Standing Status: Standing      Number of Occurrences: 1      Standing Expiration Date:      Order Specific Question:  Diet:     Answer:  Diabetic [3]   • DIET NPO     Standing Status: Standing      Number of Occurrences: 8      Standing Expiration Date:      Order Specific Question:  Restrict to:     Answer:  Sips with Medications [3]     Medical Decision Making, by Problem:  Active Hospital Problems    Diagnosis   • *Fluid collection at surgical site [T88.8XXA]-c/f infection  -s/p hardware removal and arthrotomy and drainage of the left knee with liner exchange and I&D POD#2  -Start IV ancef given most likely bacteria is MSSA, likely 6 week course, cant do PICC line for at least 24 more hours, F/U final blood cultures  -leave the other hemovac drain in place for now  -continue multi-modal pain therapy including IV narcotics PRN   • Fatty liver disease, nonalcoholic [K76.0]-encourage weight loss   • Dental caries [K02.9]-recent teeth extraction, monitor   • Common bile duct dilatation [K83.8]-unclear significance, no belly pain, LFT\"s down-trending, monitor, continues to improve today   • Elevated LFTs [R79.89]-2/2 ishcemia, versus toxicity, APAP levels are ok, down-trending continues   • Depression [F32.9]-outpatient treatment, zoloft   • Vitamin D deficiency [E55.9]-replacement   • Diabetes mellitus type II, controlled (CMS-HCC) [E11.9]-monitor surgars closely. Doing well currently  -ISS, adjust PRN   • HTN (hypertension) [I10]-well controlled, continue current meds   • HLD (hyperlipidemia) [E78.5]-statin   • GERD (gastroesophageal reflux disease) [K21.9]-PPI   • Urinary incontinence with continuous leakage [N39.45]-ditropan     Staph bacteremia-all 4/4, likely from LLE  -confirmed MSSA  -switch to " ancef as outlined above  -ECHO pending    Labs reviewed and Medications reviewed  Shannon catheter: No Shannon        DVT prophylaxis - mechanical: SCDs      Assessed for rehab: Patient was assess for and/or received rehabilitation services during this hospitalization

## 2017-03-18 NOTE — PROGRESS NOTES
Infectious Disease Progress Note    Author: Teagan Rosas M.D. DOS & Time created: 3/18/2017  12:30 PM    Chief Complaint:  Chief Complaint   Patient presents with   • Leg Pain     L   FU for left femoral hardware infection and left septic knee arthritis    Interval History:  3/18 AF, WBC 7.2, states her drains were removed yesterday, is willing to work with PT, pain stable and will return to the OR early next week, Bx - MSSA  Labs Reviewed, Medications Reviewed, Radiology Reviewed and Wound Reviewed.    Review of Systems:  Review of Systems   Constitutional: Negative for fever and chills.   Respiratory: Negative for cough and shortness of breath.    Cardiovascular: Negative for chest pain.   Gastrointestinal: Negative for nausea, vomiting, abdominal pain and diarrhea.   Genitourinary: Negative for dysuria.   Musculoskeletal: Positive for joint pain.   Neurological: Negative for headaches.   All other systems reviewed and are negative.      Hemodynamics:  Temp (24hrs), Av.1 °C (98.8 °F), Min:36.7 °C (98.1 °F), Max:37.6 °C (99.6 °F)  Temperature: 36.7 °C (98.1 °F)  Pulse  Av.2  Min: 66  Max: 97   Blood Pressure : 102/53 mmHg       Physical Exam:  Physical Exam   Constitutional: She is oriented to person, place, and time. She appears well-developed.   HENT:   Head: Normocephalic and atraumatic.   Eyes: EOM are normal. Pupils are equal, round, and reactive to light.   Neck: Neck supple.   Cardiovascular: Normal rate, regular rhythm and normal heart sounds.    Pulmonary/Chest: Effort normal and breath sounds normal.   Abdominal: Soft. There is no tenderness.   Musculoskeletal:   LLE in brace  Hemovacs - removed    R knee surgical scar clean   Neurological: She is alert and oriented to person, place, and time.   Skin: No erythema.       Meds:    Current facility-administered medications:   •  oxycodone immediate-release (ROXICODONE) tablet 5 mg, 5 mg, Oral, Q4HRS PRN **OR** oxycodone immediate release  (ROXICODONE) tablet 10 mg, 10 mg, Oral, Q4HRS PRN, David Sylvester M.D., 10 mg at 03/18/17 0724  •  vancomycin 1,800 mg in  mL IVPB, 1,800 mg, Intravenous, Q24HR, Rigo Alarcon, YAJAIRA, Stopped at 03/18/17 0923  •  insulin lispro (HUMALOG) injection 1-6 Units, 1-6 Units, Subcutaneous, Q6HRS, David Sylvester M.D., Stopped at 03/17/17 1657  •  Action is required: Protocol 1073 Hypoglycemia has been implemented, , , Once **AND** Protocol 1073 Inclusion Criteria, , , CONTINUOUS **AND** Protocol 1073 NOTIFY, , , Once **AND** Protocol 1073 Initiate protocol immediately if FSBG is less than or equal to 70 mg/dL, , , CONTINUOUS **AND** glucose 4 g chewable tablet 16 g, 16 g, Oral, Q15 MIN PRN **AND** dextrose 50% (D50W) injection 25 mL, 25 mL, Intravenous, Q15 MIN PRN, David Sylvester M.D.  •  enoxaparin (LOVENOX) inj 40 mg, 40 mg, Subcutaneous, DAILY, Rigo Irving M.D., 40 mg at 03/18/17 0723  •  losartan (COZAAR) tablet 50 mg, 50 mg, Oral, DAILY, Codi Singh M.D., 50 mg at 03/18/17 0723  •  omeprazole (PRILOSEC) capsule 20 mg, 20 mg, Oral, DAILY, Codi Singh M.D., 20 mg at 03/18/17 0723  •  oxybutynin (DITROPAN) tablet 10 mg, 10 mg, Oral, QHS, Codi Singh M.D., 10 mg at 03/17/17 1938  •  sertraline (ZOLOFT) tablet 100 mg, 100 mg, Oral, DAILY, Codi Singh M.D., 100 mg at 03/18/17 0723  •  trazodone (DESYREL) tablet 150 mg, 150 mg, Oral, QHS PRN, Codi Singh M.D.  •  tramadol (ULTRAM) 50 MG tablet 50 mg, 50 mg, Oral, Q6HRS PRN, Codi Singh M.D.  •  senna-docusate (PERICOLACE or SENOKOT S) 8.6-50 MG per tablet 2 Tab, 2 Tab, Oral, BID, 2 Tab at 03/18/17 0723 **AND** polyethylene glycol/lytes (MIRALAX) PACKET 1 Packet, 1 Packet, Oral, QDAY PRN **AND** magnesium hydroxide (MILK OF MAGNESIA) suspension 30 mL, 30 mL, Oral, QDAY PRN **AND** bisacodyl (DULCOLAX) suppository 10 mg, 10 mg, Rectal, QDAY PRN, Codi Singh M.D.  •  ondansetron (ZOFRAN) syringe/vial injection 4 mg, 4 mg,  Intravenous, Q4HRS PRN, Codi Singh M.D.  •  ondansetron (ZOFRAN ODT) dispertab 4 mg, 4 mg, Oral, Q4HRS PRN, Codi Singh M.D.  •  morphine (pf) 4 mg/ml injection 2-4 mg, 2-4 mg, Intravenous, Q2HRS PRN, Codi Singh M.D., 4 mg at 03/17/17 0849  •  MD ALERT... vancomycin per pharmacy protocol, , Other, PRN, Codi Singh M.D.  •  piperacillin-tazobactam (ZOSYN) 3.375 g in  mL IVPB, 3.375 g, Intravenous, Q6HRS, Codi Singh M.D., Last Rate: 200 mL/hr at 03/18/17 1146, 3.375 g at 03/18/17 1146  •  NS infusion 2,586 mL, 30 mL/kg, Intravenous, Once PRN, Codi Singh M.D.  •  NS (BOLUS) infusion 500 mL, 500 mL, Intravenous, Once PRN, Codi Singh M.D., Stopped at 03/17/17 0846    Labs:  Recent Labs      03/15/17   2215  03/17/17   0210  03/18/17   0312   WBC  15.1*  8.4  7.2   RBC  4.37  3.14*  3.16*   HEMOGLOBIN  12.9  9.3*  9.2*   HEMATOCRIT  37.7  27.8*  27.3*   MCV  86.3  87.3  86.4   MCH  29.5  29.3  29.1   RDW  43.7  45.9  44.4   PLATELETCT  205  155*  189   MPV  9.9  10.3  10.5   NEUTSPOLYS  91.10*  88.40*   --    LYMPHOCYTES  2.90*  4.30*   --    MONOCYTES  5.80  5.10   --    EOSINOPHILS  0.10  0.00   --    BASOPHILS  0.10  0.20   --      Recent Labs      03/15/17   2215  03/17/17   0210  03/18/17   0312   SODIUM  134*  134*  131*   POTASSIUM  4.1  3.6  3.4*   CHLORIDE  101  104  99   CO2  22  23  21   GLUCOSE  132*  117*  116*   BUN  19  20  14     Recent Labs      03/15/17   2215  03/16/17   0740  03/17/17   0210  03/18/17   0312   ALBUMIN  4.4  4.3  3.1*  3.4   TBILIRUBIN  2.5*  2.4*  1.5  1.4   ALKPHOSPHAT  152*  125*  82  90   TOTPROTEIN  6.9  6.9  5.3*  6.4   ALTSGPT  280*  206*  107*  65*   ASTSGOT  330*  192*  71*  43   CREATININE  1.24   --   0.98  0.96       Imaging:  Ct-extremity, Lower With Left    3/16/2017  3/16/2017 1:18 AM HISTORY/REASON FOR EXAM:  Atraumatic Pain/Swelling/Deformity. Left lateral thigh fluid collection Preoperative evaluation TECHNIQUE/EXAM DESCRIPTION  AND NUMBER OF VIEWS:  CT scan of the LEFT lower extremity with contrast, with reconstructions. Thin helical 3 mm sections were obtained from the distal femur through the proximal tibia/fibula. Sagittal and coronal multiplanar reconstructions were generated from the axial images. A total of 100 mL of Omnipaque 350 nonionic contrast was administered  IV without complication. Up to date radiation dose reduction adjustments have been utilized to meet ALARA standards for radiation dose reduction. COMPARISON: Left lower extremity ultrasound 3/15/2017 FINDINGS: There is a left hip arthroplasty present There is left femoral hardware present. There is a left knee arthroplasty. There is a fluid collection in the left lateral thigh. It is significantly obscured by artifact from the hardware. It is probably better assessed on the ultrasound 3/16/2017. There does however appear to be a mid/distal thigh fluid collection which by CT measures approximately 15 cm in length. Location is probably subcutaneous.     3/16/2017  1.  Limited assessment of left lateral thigh fluid collection due to artifact secondary to hardware 2.  Fluid collection is at least 15 cm in length and probably subcutaneous in location 3.  Left hip arthroplasty, left knee arthroplasty, left femoral hardware present    Us-liver And Biliary Tree    3/16/2017  3/16/2017 4:25 AM HISTORY/REASON FOR EXAM:  Abnormal liver function test Abdominal pain TECHNIQUE/EXAM DESCRIPTION AND NUMBER OF VIEWS:  Real-time sonography of the liver and biliary tree. COMPARISON: Abdominal ultrasound 6/16/2016 FINDINGS: The liver is normal in contour with increased echogenicity. There is no evidence of solid mass lesion. The liver measures 15.7 cm. The gallbladder is absent. The common duct measures 1.5 cm. The visualized pancreas is unremarkable. There is aortic atherosclerosis. Intrahepatic IVC is patent. The portal vein is patent with hepatopetal flow. The right kidney measures 8.1  cm. There is no ascites.     3/16/2017  1.  Prior cholecystectomy 2.  Moderate biliary dilation with common bile duct measuring 1.5 cm 3.  Fatty infiltration of the liver     Dx-portable Fluoroscopy < 1 Hour    3/16/2017  3/16/2017 6:13 PM HISTORY/REASON FOR EXAM:  Left hip irrigation and debridement. Left lateral thigh fluid collection. Left hip arthroplasty. TECHNIQUE/EXAM DESCRIPTION AND NUMBER OF VIEWS:  1 views of the LEFT hip. COMPARISON: CT scan 3/16/2017 FINDINGS: Single fluoroscopic image demonstrates a needle projecting over the left hip arthroplasty laterally. Fluoroscopy time of 6 seconds  was utilized by THIEN CORREA for this procedure.     3/16/2017  Intraoperative fluoroscopic spot images as described above.    Le Venous Duplex (specify In Comments Left, Right Or Bilateral)    3/16/2017   Vascular Laboratory  CONCLUSIONS  1.  Normal left lower extremity superficial and deep venous examination.  2.  In the left lateral thigh deep to the incision  There is a large complex fluid collection measuring 17.3 cm in length and  5.9 x 3.5 cm transversely. Differential includes postoperative seroma,  hematoma, or infected fluid.  ROGERS STRICKLAND  Exam Date:     03/15/2017 22:52  Room #:     Inpatient  Priority:     Stat  Ht (in):             Wt (lb):  Ordering Physician:        ANIBAL VAUGHN  Referring Physician:       [id number] Lantigua  Sonographer:               Mayur Manuel RDMS, RVT  Study Type:                Complete Unilateral  Technical Quality:         Adequate  Age:    66    Gender:     F  MRN:    [id number]  :    1950      BSA:  Indications:     Pain in left leg  CPT Codes:       70878  ICD Codes:       [id number]  History:         Pain and swelling of the left thigh  Limitations:  PROCEDURES:  Left lower extremity venous duplex imaging.  The following venous structures were evaluated: common femoral, profunda  femoral, greater saphenous, femoral, popliteal  , peroneal and posterior  tibial veins.  Serial compression, augmentation maneuvers,  color and spectral Doppler  flow evaluations were performed.  FINDINGS:  Left lower extremity -.  Complete color filling and compressibility with normal venous flow dynamics  including spontaneous flow, response to augmentation maneuvers, and  respiratory phasicity.  No superficial or deep venous thrombosis.  The peroneal and posterior tibial veins are difficult to assess for  compressibility, but flow response to augmentation is demonstrated.  Lateral left thigh at the area of concern: 17.3x5.9x3.5cm complex fluid  collection. This area is at a previous incision site.  Flow was evaluated in the contralateral common femoral vein and normal  venous flow dynamics including spontaneous flow, respiratory phasic  variation and augmentation were demonstrated.  Zander Cruz  (Electronically Signed)  Final Date:      16 March 2017                   00:09    Dx-hip-unilateral-without Pelvis-1 View Left    3/16/2017  3/16/2017 6:13 PM HISTORY/REASON FOR EXAM:  Left hip irrigation and debridement. Left lateral thigh fluid collection. Left hip arthroplasty. TECHNIQUE/EXAM DESCRIPTION AND NUMBER OF VIEWS:  1 views of the LEFT hip. COMPARISON: CT scan 3/16/2017 FINDINGS: Single fluoroscopic image demonstrates a needle projecting over the left hip arthroplasty laterally. Fluoroscopy time of 6 seconds  was utilized by THIEN CORREA for this procedure.     3/16/2017  Intraoperative fluoroscopic spot images as described above.      Micro:  BLOOD CULTURE   Date Value Ref Range Status   03/17/2017   Preliminary    No Growth    Note: Blood cultures are incubated for 5 days and  are monitored continuously.Positive blood cultures  are called to the RN and reported as soon as  they are identified.          Assessment:  Active Hospital Problems    Diagnosis   • *Fluid collection at surgical site [T88.8XXA]   • Staphylococcus aureus bacteremia [R78.81]    • Fatty liver disease, nonalcoholic [K76.0]   • Dental caries [K02.9]   • Common bile duct dilatation [K83.8]   • Elevated LFTs [R79.89]   • Depression [F32.9]   • Vitamin D deficiency [E55.9]   • Diabetes mellitus type II, controlled (CMS-HCC) [E11.9]   • HTN (hypertension) [I10]   • HLD (hyperlipidemia) [E78.5]   • GERD (gastroesophageal reflux disease) [K21.9]   • Urinary incontinence with continuous leakage [N39.45]       Plan:  Left femur hardware infection with abscess  S/p removal 03/16/17  Fluid collection seen on CT  , CRP 22  OR gram stain +GPC, Cx - staph aureus  D/c zosyn and vanco  Transition to cefazolin. Anticipate 6 week course from 3/16/17.   Will return to OR Sunday or Tuesday    Left septic knee arthritis  S/p arthrotomy and drainage of knee with liner exchange 3/16  Abx per above    MSSA bacteremia  3/16 Bcx - positive   3/17 Bcx - NGTD  Abx per above  Hold PICC until repeat bcx are negative x 48 hrs    DM2  Optimize BS control to assist with wound healing    DW IM

## 2017-03-18 NOTE — THERAPY
"Physical Therapy Evaluation completed.   Bed Mobility:  Supine to Sit: Minimal Assist  Transfers: Sit to Stand: Contact Guard Assist  Gait: Level Of Assist: Stand by Assist with Front-Wheel Walker       Plan of Care: Will benefit from Physical Therapy 3 times per week  Discharge Recommendations: Equipment: No Equipment Needed. Post-acute therapy: To be determined after next surgical procedure planned for next week    See \"Rehab Therapy-Acute\" Patient Summary Report for complete documentation.     "

## 2017-03-18 NOTE — PROGRESS NOTES
"Patient seen and examined  Complains of mild left knee pain and swelling, hemovac out    Blood pressure 102/53, pulse 97, temperature 36.7 °C (98.1 °F), temperature source Temporal, resp. rate 18, height 1.702 m (5' 7\"), weight 86.183 kg (190 lb), last menstrual period 10/01/1990, SpO2 98 %, not currently breastfeeding.    Recent Labs      03/15/17   2215  03/17/17   0210  03/18/17   0312   WBC  15.1*  8.4  7.2   RBC  4.37  3.14*  3.16*   HEMOGLOBIN  12.9  9.3*  9.2*   HEMATOCRIT  37.7  27.8*  27.3*   MCV  86.3  87.3  86.4   MCH  29.5  29.3  29.1   MCHC  34.2  33.6  33.7   RDW  43.7  45.9  44.4   PLATELETCT  205  155*  189   MPV  9.9  10.3  10.5       Intake/Output Summary (Last 24 hours) at 03/18/17 1435  Last data filed at 03/18/17 0400   Gross per 24 hour   Intake      0 ml   Output     90 ml   Net    -90 ml       No acute distress  Dressing clean dry and intact  Neurovascularly intact  HV removed    Plan:  DVT Prophylaxis- TEDS/SCDs  Weight Bearing Status-as tolerated  PT/OT  NPO p MDN, might have liner exchange left knee in am        "

## 2017-03-18 NOTE — PROGRESS NOTES
Attempting to find patient a foot pump sleeve. Entered order for one to be sent from supply yesterday and never received.  Entered another order today and called supply.  They stated they don't have any down there right now but will try to find one.

## 2017-03-19 ENCOUNTER — APPOINTMENT (OUTPATIENT)
Dept: RADIOLOGY | Facility: MEDICAL CENTER | Age: 67
DRG: 464 | End: 2017-03-19
Attending: ORTHOPAEDIC SURGERY
Payer: MEDICARE

## 2017-03-19 LAB
BACTERIA BLD CULT: ABNORMAL
BACTERIA SPEC ANAEROBE CULT: NORMAL
BACTERIA TISS AEROBE CULT: ABNORMAL
BACTERIA WND AEROBE CULT: ABNORMAL
BACTERIA WND AEROBE CULT: ABNORMAL
GLUCOSE BLD-MCNC: 128 MG/DL (ref 65–99)
GLUCOSE BLD-MCNC: 141 MG/DL (ref 65–99)
GLUCOSE BLD-MCNC: 236 MG/DL (ref 65–99)
GRAM STN SPEC: ABNORMAL
GRAM STN SPEC: NORMAL
SIGNIFICANT IND 70042: ABNORMAL
SIGNIFICANT IND 70042: NORMAL
SITE SITE: ABNORMAL
SITE SITE: NORMAL
SOURCE SOURCE: ABNORMAL
SOURCE SOURCE: NORMAL
VANCOMYCIN TROUGH SERPL-MCNC: 23 UG/ML (ref 10–20)

## 2017-03-19 PROCEDURE — 500811 HCHG LENS/HOOD FOR SPACESUIT: Performed by: ORTHOPAEDIC SURGERY

## 2017-03-19 PROCEDURE — 700111 HCHG RX REV CODE 636 W/ 250 OVERRIDE (IP)

## 2017-03-19 PROCEDURE — 87077 CULTURE AEROBIC IDENTIFY: CPT

## 2017-03-19 PROCEDURE — 700102 HCHG RX REV CODE 250 W/ 637 OVERRIDE(OP): Performed by: INTERNAL MEDICINE

## 2017-03-19 PROCEDURE — 500389 HCHG DRAIN, RESERVOIR SUCT JP 100CC: Performed by: ORTHOPAEDIC SURGERY

## 2017-03-19 PROCEDURE — 160048 HCHG OR STATISTICAL LEVEL 1-5: Performed by: ORTHOPAEDIC SURGERY

## 2017-03-19 PROCEDURE — 0SPD09Z REMOVAL OF LINER FROM LEFT KNEE JOINT, OPEN APPROACH: ICD-10-PCS | Performed by: ORTHOPAEDIC SURGERY

## 2017-03-19 PROCEDURE — 160041 HCHG SURGERY MINUTES - EA ADDL 1 MIN LEVEL 4: Performed by: ORTHOPAEDIC SURGERY

## 2017-03-19 PROCEDURE — 160029 HCHG SURGERY MINUTES - 1ST 30 MINS LEVEL 4: Performed by: ORTHOPAEDIC SURGERY

## 2017-03-19 PROCEDURE — 700102 HCHG RX REV CODE 250 W/ 637 OVERRIDE(OP)

## 2017-03-19 PROCEDURE — A6223 GAUZE >16<=48 NO W/SAL W/O B: HCPCS | Performed by: ORTHOPAEDIC SURGERY

## 2017-03-19 PROCEDURE — 0QBF0ZX EXCISION OF LEFT PATELLA, OPEN APPROACH, DIAGNOSTIC: ICD-10-PCS | Performed by: ORTHOPAEDIC SURGERY

## 2017-03-19 PROCEDURE — 700111 HCHG RX REV CODE 636 W/ 250 OVERRIDE (IP): Performed by: INTERNAL MEDICINE

## 2017-03-19 PROCEDURE — 500054 HCHG BANDAGE, ELASTIC 6: Performed by: ORTHOPAEDIC SURGERY

## 2017-03-19 PROCEDURE — 501487 HCHG STRYKER TIP: Performed by: ORTHOPAEDIC SURGERY

## 2017-03-19 PROCEDURE — 306481 GARMENT,FOOT IMPAD VASO: Performed by: ORTHOPAEDIC SURGERY

## 2017-03-19 PROCEDURE — 36415 COLL VENOUS BLD VENIPUNCTURE: CPT

## 2017-03-19 PROCEDURE — A9270 NON-COVERED ITEM OR SERVICE: HCPCS

## 2017-03-19 PROCEDURE — 501485 HCHG STRYKER BRUSH FEMORAL CANAL: Performed by: ORTHOPAEDIC SURGERY

## 2017-03-19 PROCEDURE — 3E0U029 INTRODUCTION OF OTHER ANTI-INFECTIVE INTO JOINTS, OPEN APPROACH: ICD-10-PCS | Performed by: ORTHOPAEDIC SURGERY

## 2017-03-19 PROCEDURE — 160009 HCHG ANES TIME/MIN: Performed by: ORTHOPAEDIC SURGERY

## 2017-03-19 PROCEDURE — 87075 CULTR BACTERIA EXCEPT BLOOD: CPT | Mod: 91

## 2017-03-19 PROCEDURE — 502240 HCHG MISC OR SUPPLY RC 0272: Performed by: ORTHOPAEDIC SURGERY

## 2017-03-19 PROCEDURE — 500881 HCHG PACK, EXTREMITY: Performed by: ORTHOPAEDIC SURGERY

## 2017-03-19 PROCEDURE — 87015 SPECIMEN INFECT AGNT CONCNTJ: CPT | Mod: 91

## 2017-03-19 PROCEDURE — 82962 GLUCOSE BLOOD TEST: CPT | Mod: 91

## 2017-03-19 PROCEDURE — 501480 HCHG STOCKINETTE: Performed by: ORTHOPAEDIC SURGERY

## 2017-03-19 PROCEDURE — A9270 NON-COVERED ITEM OR SERVICE: HCPCS | Performed by: INTERNAL MEDICINE

## 2017-03-19 PROCEDURE — A4314 CATH W/DRAINAGE 2-WAY LATEX: HCPCS | Performed by: ORTHOPAEDIC SURGERY

## 2017-03-19 PROCEDURE — A6454 SELF-ADHER BAND W>=3" <5"/YD: HCPCS | Performed by: ORTHOPAEDIC SURGERY

## 2017-03-19 PROCEDURE — 160002 HCHG RECOVERY MINUTES (STAT): Performed by: ORTHOPAEDIC SURGERY

## 2017-03-19 PROCEDURE — 500423 HCHG DRESSING, ABD COMBINE: Performed by: ORTHOPAEDIC SURGERY

## 2017-03-19 PROCEDURE — 160035 HCHG PACU - 1ST 60 MINS PHASE I: Performed by: ORTHOPAEDIC SURGERY

## 2017-03-19 PROCEDURE — 80202 ASSAY OF VANCOMYCIN: CPT

## 2017-03-19 PROCEDURE — 87070 CULTURE OTHR SPECIMN AEROBIC: CPT

## 2017-03-19 PROCEDURE — 0SUD09Z SUPPLEMENT LEFT KNEE JOINT WITH LINER, OPEN APPROACH: ICD-10-PCS | Performed by: ORTHOPAEDIC SURGERY

## 2017-03-19 PROCEDURE — 501445 HCHG STAPLER, SKIN DISP: Performed by: ORTHOPAEDIC SURGERY

## 2017-03-19 PROCEDURE — 110382 HCHG SHELL REV 271: Performed by: ORTHOPAEDIC SURGERY

## 2017-03-19 PROCEDURE — 500375 HCHG DRAIN, J-P ROUND 10FR: Performed by: ORTHOPAEDIC SURGERY

## 2017-03-19 PROCEDURE — A4606 OXYGEN PROBE USED W OXIMETER: HCPCS | Performed by: ORTHOPAEDIC SURGERY

## 2017-03-19 PROCEDURE — 99232 SBSQ HOSP IP/OBS MODERATE 35: CPT | Performed by: INTERNAL MEDICINE

## 2017-03-19 PROCEDURE — 700105 HCHG RX REV CODE 258

## 2017-03-19 PROCEDURE — 700111 HCHG RX REV CODE 636 W/ 250 OVERRIDE (IP): Performed by: ORTHOPAEDIC SURGERY

## 2017-03-19 PROCEDURE — 73560 X-RAY EXAM OF KNEE 1 OR 2: CPT | Mod: LT

## 2017-03-19 PROCEDURE — 87205 SMEAR GRAM STAIN: CPT | Mod: 91

## 2017-03-19 PROCEDURE — 160036 HCHG PACU - EA ADDL 30 MINS PHASE I: Performed by: ORTHOPAEDIC SURGERY

## 2017-03-19 PROCEDURE — 87186 SC STD MICRODIL/AGAR DIL: CPT

## 2017-03-19 PROCEDURE — 770006 HCHG ROOM/CARE - MED/SURG/GYN SEMI*

## 2017-03-19 PROCEDURE — 700101 HCHG RX REV CODE 250

## 2017-03-19 PROCEDURE — 502000 HCHG MISC OR IMPLANTS RC 0278: Performed by: ORTHOPAEDIC SURGERY

## 2017-03-19 PROCEDURE — 501838 HCHG SUTURE GENERAL: Performed by: ORTHOPAEDIC SURGERY

## 2017-03-19 PROCEDURE — 501486 HCHG STRYKER IRRIG SET HC W/TUBING: Performed by: ORTHOPAEDIC SURGERY

## 2017-03-19 PROCEDURE — 0QBC0ZX EXCISION OF LEFT LOWER FEMUR, OPEN APPROACH, DIAGNOSTIC: ICD-10-PCS | Performed by: ORTHOPAEDIC SURGERY

## 2017-03-19 DEVICE — IMPLANTABLE DEVICE: Type: IMPLANTABLE DEVICE | Status: FUNCTIONAL

## 2017-03-19 RX ORDER — HYDRALAZINE HYDROCHLORIDE 20 MG/ML
10 INJECTION INTRAMUSCULAR; INTRAVENOUS EVERY 6 HOURS PRN
Status: DISCONTINUED | OUTPATIENT
Start: 2017-03-19 | End: 2017-03-27 | Stop reason: HOSPADM

## 2017-03-19 RX ORDER — ACETAMINOPHEN 650 MG
TABLET, EXTENDED RELEASE ORAL
Status: DISCONTINUED | OUTPATIENT
Start: 2017-03-19 | End: 2017-03-19 | Stop reason: HOSPADM

## 2017-03-19 RX ORDER — HYDRALAZINE HYDROCHLORIDE 20 MG/ML
INJECTION INTRAMUSCULAR; INTRAVENOUS
Status: COMPLETED
Start: 2017-03-19 | End: 2017-03-19

## 2017-03-19 RX ORDER — BACITRACIN 50000 [IU]/1
INJECTION, POWDER, FOR SOLUTION INTRAMUSCULAR
Status: DISCONTINUED | OUTPATIENT
Start: 2017-03-19 | End: 2017-03-19 | Stop reason: HOSPADM

## 2017-03-19 RX ORDER — SODIUM CHLORIDE 9 MG/ML
INJECTION, SOLUTION INTRAVENOUS
Status: COMPLETED
Start: 2017-03-19 | End: 2017-03-19

## 2017-03-19 RX ORDER — OXYCODONE HCL 5 MG/5 ML
SOLUTION, ORAL ORAL
Status: COMPLETED
Start: 2017-03-19 | End: 2017-03-19

## 2017-03-19 RX ORDER — TOBRAMYCIN 1.2 G/30ML
1.2 INJECTION, POWDER, LYOPHILIZED, FOR SOLUTION INTRAVENOUS
Status: DISCONTINUED | OUTPATIENT
Start: 2017-03-19 | End: 2017-03-27 | Stop reason: HOSPADM

## 2017-03-19 RX ORDER — TOBRAMYCIN 1.2 G/30ML
INJECTION, POWDER, LYOPHILIZED, FOR SOLUTION INTRAVENOUS
Status: DISCONTINUED | OUTPATIENT
Start: 2017-03-19 | End: 2017-03-19 | Stop reason: HOSPADM

## 2017-03-19 RX ADMIN — SERTRALINE 100 MG: 100 TABLET, FILM COATED ORAL at 08:41

## 2017-03-19 RX ADMIN — OXYCODONE HYDROCHLORIDE 5 MG: 5 TABLET ORAL at 15:13

## 2017-03-19 RX ADMIN — INSULIN LISPRO 2 UNITS: 100 INJECTION, SOLUTION INTRAVENOUS; SUBCUTANEOUS at 18:04

## 2017-03-19 RX ADMIN — FENTANYL CITRATE 25 MCG: 50 INJECTION, SOLUTION INTRAMUSCULAR; INTRAVENOUS at 12:55

## 2017-03-19 RX ADMIN — SODIUM CHLORIDE 1000 ML: 9 INJECTION, SOLUTION INTRAVENOUS at 22:51

## 2017-03-19 RX ADMIN — LOSARTAN POTASSIUM 50 MG: 50 TABLET, FILM COATED ORAL at 08:42

## 2017-03-19 RX ADMIN — OXYBUTYNIN CHLORIDE 10 MG: 5 TABLET ORAL at 20:54

## 2017-03-19 RX ADMIN — CEFAZOLIN SODIUM 2 G: 2 INJECTION, SOLUTION INTRAVENOUS at 05:59

## 2017-03-19 RX ADMIN — HYDRALAZINE HYDROCHLORIDE 5 MG: 20 INJECTION INTRAMUSCULAR; INTRAVENOUS at 12:50

## 2017-03-19 RX ADMIN — OXYCODONE HYDROCHLORIDE 10 MG: 5 SOLUTION ORAL at 12:45

## 2017-03-19 RX ADMIN — FENTANYL CITRATE 50 MCG: 50 INJECTION, SOLUTION INTRAMUSCULAR; INTRAVENOUS at 12:17

## 2017-03-19 RX ADMIN — Medication 2 TABLET: at 20:54

## 2017-03-19 RX ADMIN — CEFAZOLIN SODIUM 2 G: 2 INJECTION, SOLUTION INTRAVENOUS at 22:47

## 2017-03-19 RX ADMIN — CEFAZOLIN SODIUM 2 G: 2 INJECTION, SOLUTION INTRAVENOUS at 14:22

## 2017-03-19 RX ADMIN — HYDRALAZINE HYDROCHLORIDE 10 MG: 20 INJECTION INTRAMUSCULAR; INTRAVENOUS at 16:15

## 2017-03-19 RX ADMIN — OXYCODONE HYDROCHLORIDE 10 MG: 10 TABLET ORAL at 20:54

## 2017-03-19 RX ADMIN — FENTANYL CITRATE 50 MCG: 50 INJECTION, SOLUTION INTRAMUSCULAR; INTRAVENOUS at 12:36

## 2017-03-19 RX ADMIN — Medication 2 TABLET: at 08:41

## 2017-03-19 RX ADMIN — OMEPRAZOLE 20 MG: 20 CAPSULE, DELAYED RELEASE ORAL at 08:42

## 2017-03-19 ASSESSMENT — ENCOUNTER SYMPTOMS
VOMITING: 0
NECK PAIN: 0
SHORTNESS OF BREATH: 0
DIZZINESS: 0
HEADACHES: 0
BLURRED VISION: 0
DIARRHEA: 0
DEPRESSION: 0
COUGH: 0
LOSS OF CONSCIOUSNESS: 0
MYALGIAS: 1
NAUSEA: 0
FEVER: 0
CHILLS: 0
ABDOMINAL PAIN: 0

## 2017-03-19 ASSESSMENT — PAIN SCALES - GENERAL
PAINLEVEL_OUTOF10: 6
PAINLEVEL_OUTOF10: 5
PAINLEVEL_OUTOF10: 7
PAINLEVEL_OUTOF10: 9
PAINLEVEL_OUTOF10: 6
PAINLEVEL_OUTOF10: 7

## 2017-03-19 NOTE — PROGRESS NOTES
Hospital Medicine Progress Note, Adult, Complex               Author: David Sylvester Date & Time created: 3/19/2017  3:32 PM     Interval History:  67 y/o female with LLE fluid collection    LLE fluid collection-s/p arthrotomy, hardware removal and arthrotomy and drainage of the left knee with liner exchange POD#2. Patient went back to the OR today for repeat I&D and knee revision with liner exchange. Cultures were obtained. Patient is feeling ok except for pain in the LLE. Afebrile overnight.    Elevated LFTS-resolved    Review of Systems:  Review of Systems   Constitutional: Positive for malaise/fatigue (about the same). Negative for fever.   Eyes: Negative for blurred vision.   Respiratory: Negative for shortness of breath.    Cardiovascular: Negative for chest pain and leg swelling.   Gastrointestinal: Negative for abdominal pain.   Genitourinary: Negative for dysuria.   Musculoskeletal: Positive for myalgias and joint pain. Negative for neck pain.        Worse pain today after the surgery   Skin: Negative for itching.   Neurological: Negative for dizziness, loss of consciousness and headaches.   Psychiatric/Behavioral: Negative for depression.   All other systems reviewed and are negative.      Physical Exam:  Physical Exam   Constitutional: She is oriented to person, place, and time. She appears well-developed and well-nourished. No distress.   Tardive dyskinetic movements, less pronounced today   HENT:   Head: Normocephalic and atraumatic.   Mouth/Throat: No oropharyngeal exudate.   Very poor dentition   Eyes: Pupils are equal, round, and reactive to light. Right eye exhibits no discharge. Left eye exhibits no discharge.   Neck: Normal range of motion. Neck supple.   Cardiovascular: Normal rate, regular rhythm, normal heart sounds and intact distal pulses.    No murmur heard.  Pulmonary/Chest: Effort normal and breath sounds normal. No stridor. She has no wheezes. She has no rales.   Abdominal: Soft. Bowel  sounds are normal. There is no tenderness.   Musculoskeletal: Normal range of motion. She exhibits edema and tenderness.   Multiple surgical scars, bandages in place, edema, moderate TTP, warm peripherally   Neurological: She is alert and oriented to person, place, and time. No cranial nerve deficit.   Skin: Skin is warm and dry. No rash noted.   Psychiatric: She has a normal mood and affect.   Nursing note and vitals reviewed.      Labs:        Invalid input(s): [id number]      Recent Labs      17   SODIUM  134*  131*   POTASSIUM  3.6  3.4*   CHLORIDE  104  99   CO2  23  21   BUN  20  14   CREATININE  0.98  0.96   CALCIUM  8.5  9.6     Recent Labs      17   ALTSGPT  107*  65*   ASTSGOT  71*  43   ALKPHOSPHAT  82  90   TBILIRUBIN  1.5  1.4   GLUCOSE  117*  116*     Recent Labs      17   RBC  3.14*  3.16*   HEMOGLOBIN  9.3*  9.2*   HEMATOCRIT  27.8*  27.3*   PLATELETCT  155*  189     Recent Labs      172   WBC  8.4  7.2   NEUTSPOLYS  88.40*   --    LYMPHOCYTES  4.30*   --    MONOCYTES  5.10   --    EOSINOPHILS  0.00   --    BASOPHILS  0.20   --    ASTSGOT  71*  43   ALTSGPT  107*  65*   ALKPHOSPHAT  82  90   TBILIRUBIN  1.5  1.4           Hemodynamics:  Temp (24hrs), Av.1 °C (98.8 °F), Min:36.4 °C (97.5 °F), Max:37.4 °C (99.4 °F)  Temperature: 37.2 °C (99 °F)  Pulse  Av.7  Min: 53  Max: 97Heart Rate (Monitored): 66  Blood Pressure : (!) 166/83 mmHg, NIBP: (!) 174/104 mmHg (MD notified)     Respiratory:    Respiration: 14, Pulse Oximetry: 95 %           Fluids:    Intake/Output Summary (Last 24 hours) at 17 1532  Last data filed at 17 1300   Gross per 24 hour   Intake   1500 ml   Output     50 ml   Net   1450 ml        GI/Nutrition:  Orders Placed This Encounter   Procedures   • DIET ORDER     Standing Status: Standing      Number of Occurrences: 1      Standing Expiration  "Date:      Order Specific Question:  Diet:     Answer:  Low Fiber(GI Soft) [2]     Medical Decision Making, by Problem:  Active Hospital Problems    Diagnosis   • *Fluid collection at surgical site [T88.8XXA]-c/f infection  -s/p hardware removal and arthrotomy and drainage of the left knee with liner exchange and I&D POD#3  -repeat I&D today with liner exchange  -Start IV ancef given most likely bacteria is MSSA, likely 6 week course, cant do PICC line for at least 24 more hours, F/U final blood cultures, likely tomorrow  -F/U new intra-op cultures collected today  -continue multi-modal pain therapy including IV narcotics PRN   • Fatty liver disease, nonalcoholic [K76.0]-encourage weight loss   • Dental caries [K02.9]-recent teeth extraction, monitor   • Common bile duct dilatation [K83.8]-unclear significance, no belly pain, LFT\"s down-trending, monitor, continues to improve today   • Elevated LFTs [R79.89]-2/2 ishcemia, versus toxicity, APAP levels are ok, down-trending continues   • Depression [F32.9]-outpatient treatment, zoloft   • Vitamin D deficiency [E55.9]-replacement   • Diabetes mellitus type II, controlled (CMS-HCC) [E11.9]-monitor surgars closely. Doing well currently again  -ISS, adjust PRN   • HTN (hypertension) [I10]-well controlled, continue current meds   • HLD (hyperlipidemia) [E78.5]-statin   • GERD (gastroesophageal reflux disease) [K21.9]-PPI   • Urinary incontinence with continuous leakage [N39.45]-ditropan     Staph bacteremia-all 4/4, likely from LLE  -confirmed MSSA  -continue ancef as described above  -ECHO with no e/o valvular vegetations    Labs reviewed and Medications reviewed  Shannon catheter: No Shannon        DVT prophylaxis - mechanical: SCDs      Assessed for rehab: Patient was assess for and/or received rehabilitation services during this hospitalization        "

## 2017-03-19 NOTE — PROGRESS NOTES
Infectious Disease Progress Note    Author: Teagan Rosas M.D. DOS & Time created: 3/19/2017  1:22 PM    Chief Complaint:  Chief Complaint   Patient presents with   • Leg Pain     L   FU for left femoral hardware infection and left septic knee arthritis    Interval History:  3/18 AF, WBC 7.2, states her drains were removed yesterday, is willing to work with PT, pain stable and will return to the OR early next week, Bx - MSSA  3/19 AF, no CBC, plan to return to OR today for repeat I&D and liner exchange  Labs Reviewed, Medications Reviewed, Radiology Reviewed and Wound Reviewed.    Review of Systems:  Review of Systems   Constitutional: Negative for fever and chills.   Respiratory: Negative for cough and shortness of breath.    Cardiovascular: Negative for chest pain.   Gastrointestinal: Negative for nausea, vomiting, abdominal pain and diarrhea.   Genitourinary: Negative for dysuria.   Musculoskeletal: Positive for joint pain.   Neurological: Negative for headaches.   All other systems reviewed and are negative.      Hemodynamics:  Temp (24hrs), Av.1 °C (98.8 °F), Min:36.4 °C (97.5 °F), Max:37.4 °C (99.4 °F)  Temperature: 37.2 °C (99 °F)  Pulse  Av.3  Min: 53  Max: 97Heart Rate (Monitored): 66  Blood Pressure : (!) 166/83 mmHg, NIBP: 127/50 mmHg       Physical Exam:  Physical Exam   Constitutional: She is oriented to person, place, and time. She appears well-developed.   HENT:   Head: Normocephalic and atraumatic.   Eyes: EOM are normal. Pupils are equal, round, and reactive to light.   Neck: Neck supple.   Cardiovascular: Normal rate, regular rhythm and normal heart sounds.    Pulmonary/Chest: Effort normal and breath sounds normal.   Abdominal: Soft. There is no tenderness.   Musculoskeletal:   LLE in brace  Hemovacs - removed    R knee surgical scar clean   Neurological: She is alert and oriented to person, place, and time.   Skin: No erythema.       Meds:    Current facility-administered  medications:   •  tobramycin (NEBCIN) injection 1.2 g, 1.2 g, Other, Intra-Op Once PRN, Rigo Irving M.D.  •  [START ON 3/20/2017] aspirin EC (ECOTRIN) tablet 325 mg, 325 mg, Oral, DAILY, Rigo Irving M.D.  •  ceFAZolin (ANCEF) IVPB 2 g, 2 g, Intravenous, Q8HRS, Teagan Rosas M.D., 2 g at 03/19/17 0559  •  oxycodone immediate-release (ROXICODONE) tablet 5 mg, 5 mg, Oral, Q4HRS PRN **OR** oxycodone immediate release (ROXICODONE) tablet 10 mg, 10 mg, Oral, Q4HRS PRN, David Sylvester M.D., 10 mg at 03/18/17 1958  •  insulin lispro (HUMALOG) injection 1-6 Units, 1-6 Units, Subcutaneous, Q6HRS, David Sylvester M.D., Stopped at 03/17/17 1657  •  Action is required: Protocol 1073 Hypoglycemia has been implemented, , , Once **AND** Protocol 1073 Inclusion Criteria, , , CONTINUOUS **AND** Protocol 1073 NOTIFY, , , Once **AND** Protocol 1073 Initiate protocol immediately if FSBG is less than or equal to 70 mg/dL, , , CONTINUOUS **AND** glucose 4 g chewable tablet 16 g, 16 g, Oral, Q15 MIN PRN **AND** dextrose 50% (D50W) injection 25 mL, 25 mL, Intravenous, Q15 MIN PRN, David Sylvester M.D.  •  enoxaparin (LOVENOX) inj 40 mg, 40 mg, Subcutaneous, DAILY, Rigo Irving M.D., Stopped at 03/19/17 0842  •  losartan (COZAAR) tablet 50 mg, 50 mg, Oral, DAILY, Codi Singh M.D., 50 mg at 03/19/17 0842  •  omeprazole (PRILOSEC) capsule 20 mg, 20 mg, Oral, DAILY, Codi Singh M.D., 20 mg at 03/19/17 0842  •  oxybutynin (DITROPAN) tablet 10 mg, 10 mg, Oral, QHS, Codi Singh M.D., 10 mg at 03/18/17 1958  •  sertraline (ZOLOFT) tablet 100 mg, 100 mg, Oral, DAILY, Codi Singh M.D., 100 mg at 03/19/17 0841  •  trazodone (DESYREL) tablet 150 mg, 150 mg, Oral, QHS PRN, Codi Singh M.D.  •  tramadol (ULTRAM) 50 MG tablet 50 mg, 50 mg, Oral, Q6HRS PRN, Codi Singh M.D.  •  senna-docusate (PERICOLACE or SENOKOT S) 8.6-50 MG per tablet 2 Tab, 2 Tab, Oral, BID, 2 Tab at 03/19/17 0841 **AND**  polyethylene glycol/lytes (MIRALAX) PACKET 1 Packet, 1 Packet, Oral, QDAY PRN **AND** magnesium hydroxide (MILK OF MAGNESIA) suspension 30 mL, 30 mL, Oral, QDAY PRN **AND** bisacodyl (DULCOLAX) suppository 10 mg, 10 mg, Rectal, QDAY PRN, Codi Singh M.D.  •  ondansetron (ZOFRAN) syringe/vial injection 4 mg, 4 mg, Intravenous, Q4HRS PRN, Codi Singh M.D.  •  ondansetron (ZOFRAN ODT) dispertab 4 mg, 4 mg, Oral, Q4HRS PRN, Codi Singh M.D.  •  morphine (pf) 4 mg/ml injection 2-4 mg, 2-4 mg, Intravenous, Q2HRS PRN, Codi Singh M.D., 4 mg at 03/17/17 0849  •  NS infusion 2,586 mL, 30 mL/kg, Intravenous, Once PRN, Codi Singh M.D.  •  NS (BOLUS) infusion 500 mL, 500 mL, Intravenous, Once PRN, Codi Singh M.D., Stopped at 03/17/17 0846    Labs:  Recent Labs      03/17/17 0210 03/18/17 0312   WBC  8.4  7.2   RBC  3.14*  3.16*   HEMOGLOBIN  9.3*  9.2*   HEMATOCRIT  27.8*  27.3*   MCV  87.3  86.4   MCH  29.3  29.1   RDW  45.9  44.4   PLATELETCT  155*  189   MPV  10.3  10.5   NEUTSPOLYS  88.40*   --    LYMPHOCYTES  4.30*   --    MONOCYTES  5.10   --    EOSINOPHILS  0.00   --    BASOPHILS  0.20   --      Recent Labs      03/17/17 0210  03/18/17   0312   SODIUM  134*  131*   POTASSIUM  3.6  3.4*   CHLORIDE  104  99   CO2  23  21   GLUCOSE  117*  116*   BUN  20  14     Recent Labs      03/17/17 0210  03/18/17   0312   ALBUMIN  3.1*  3.4   TBILIRUBIN  1.5  1.4   ALKPHOSPHAT  82  90   TOTPROTEIN  5.3*  6.4   ALTSGPT  107*  65*   ASTSGOT  71*  43   CREATININE  0.98  0.96       Imaging:  Ct-extremity, Lower With Left    3/16/2017  3/16/2017 1:18 AM HISTORY/REASON FOR EXAM:  Atraumatic Pain/Swelling/Deformity. Left lateral thigh fluid collection Preoperative evaluation TECHNIQUE/EXAM DESCRIPTION AND NUMBER OF VIEWS:  CT scan of the LEFT lower extremity with contrast, with reconstructions. Thin helical 3 mm sections were obtained from the distal femur through the proximal tibia/fibula. Sagittal and  coronal multiplanar reconstructions were generated from the axial images. A total of 100 mL of Omnipaque 350 nonionic contrast was administered  IV without complication. Up to date radiation dose reduction adjustments have been utilized to meet ALARA standards for radiation dose reduction. COMPARISON: Left lower extremity ultrasound 3/15/2017 FINDINGS: There is a left hip arthroplasty present There is left femoral hardware present. There is a left knee arthroplasty. There is a fluid collection in the left lateral thigh. It is significantly obscured by artifact from the hardware. It is probably better assessed on the ultrasound 3/16/2017. There does however appear to be a mid/distal thigh fluid collection which by CT measures approximately 15 cm in length. Location is probably subcutaneous.     3/16/2017  1.  Limited assessment of left lateral thigh fluid collection due to artifact secondary to hardware 2.  Fluid collection is at least 15 cm in length and probably subcutaneous in location 3.  Left hip arthroplasty, left knee arthroplasty, left femoral hardware present    Us-liver And Biliary Tree    3/16/2017  3/16/2017 4:25 AM HISTORY/REASON FOR EXAM:  Abnormal liver function test Abdominal pain TECHNIQUE/EXAM DESCRIPTION AND NUMBER OF VIEWS:  Real-time sonography of the liver and biliary tree. COMPARISON: Abdominal ultrasound 6/16/2016 FINDINGS: The liver is normal in contour with increased echogenicity. There is no evidence of solid mass lesion. The liver measures 15.7 cm. The gallbladder is absent. The common duct measures 1.5 cm. The visualized pancreas is unremarkable. There is aortic atherosclerosis. Intrahepatic IVC is patent. The portal vein is patent with hepatopetal flow. The right kidney measures 8.1 cm. There is no ascites.     3/16/2017  1.  Prior cholecystectomy 2.  Moderate biliary dilation with common bile duct measuring 1.5 cm 3.  Fatty infiltration of the liver     Dx-portable Fluoroscopy < 1  Hour    3/16/2017  3/16/2017 6:13 PM HISTORY/REASON FOR EXAM:  Left hip irrigation and debridement. Left lateral thigh fluid collection. Left hip arthroplasty. TECHNIQUE/EXAM DESCRIPTION AND NUMBER OF VIEWS:  1 views of the LEFT hip. COMPARISON: CT scan 3/16/2017 FINDINGS: Single fluoroscopic image demonstrates a needle projecting over the left hip arthroplasty laterally. Fluoroscopy time of 6 seconds  was utilized by THIEN CORREA for this procedure.     3/16/2017  Intraoperative fluoroscopic spot images as described above.    Le Venous Duplex (specify In Comments Left, Right Or Bilateral)    3/16/2017   Vascular Laboratory  CONCLUSIONS  1.  Normal left lower extremity superficial and deep venous examination.  2.  In the left lateral thigh deep to the incision  There is a large complex fluid collection measuring 17.3 cm in length and  5.9 x 3.5 cm transversely. Differential includes postoperative seroma,  hematoma, or infected fluid.  ROGERS STRICKLAND  Exam Date:     03/15/2017 22:52  Room #:     Inpatient  Priority:     Stat  Ht (in):             Wt (lb):  Ordering Physician:        ANIBAL VAUGHN  Referring Physician:       [id number] Cifuentes  Sonographer:               Mayur Manuel RDMS, RVT  Study Type:                Complete Unilateral  Technical Quality:         Adequate  Age:    66    Gender:     F  MRN:    [id number]  :    1950      BSA:  Indications:     Pain in left leg  CPT Codes:       15700  ICD Codes:       [id number]  History:         Pain and swelling of the left thigh  Limitations:  PROCEDURES:  Left lower extremity venous duplex imaging.  The following venous structures were evaluated: common femoral, profunda  femoral, greater saphenous, femoral, popliteal , peroneal and posterior  tibial veins.  Serial compression, augmentation maneuvers,  color and spectral Doppler  flow evaluations were performed.  FINDINGS:  Left lower extremity -.  Complete color  filling and compressibility with normal venous flow dynamics  including spontaneous flow, response to augmentation maneuvers, and  respiratory phasicity.  No superficial or deep venous thrombosis.  The peroneal and posterior tibial veins are difficult to assess for  compressibility, but flow response to augmentation is demonstrated.  Lateral left thigh at the area of concern: 17.3x5.9x3.5cm complex fluid  collection. This area is at a previous incision site.  Flow was evaluated in the contralateral common femoral vein and normal  venous flow dynamics including spontaneous flow, respiratory phasic  variation and augmentation were demonstrated.  Zander Cruz  (Electronically Signed)  Final Date:      16 March 2017                   00:09    Dx-hip-unilateral-without Pelvis-1 View Left    3/16/2017  3/16/2017 6:13 PM HISTORY/REASON FOR EXAM:  Left hip irrigation and debridement. Left lateral thigh fluid collection. Left hip arthroplasty. TECHNIQUE/EXAM DESCRIPTION AND NUMBER OF VIEWS:  1 views of the LEFT hip. COMPARISON: CT scan 3/16/2017 FINDINGS: Single fluoroscopic image demonstrates a needle projecting over the left hip arthroplasty laterally. Fluoroscopy time of 6 seconds  was utilized by THIEN CORREA for this procedure.     3/16/2017  Intraoperative fluoroscopic spot images as described above.      Micro:  BLOOD CULTURE   Date Value Ref Range Status   03/17/2017   Preliminary    No Growth    Note: Blood cultures are incubated for 5 days and  are monitored continuously.Positive blood cultures  are called to the RN and reported as soon as  they are identified.          Assessment:  Active Hospital Problems    Diagnosis   • *Fluid collection at surgical site [T88.8XXA]   • Staphylococcus aureus bacteremia [R78.81]   • Fatty liver disease, nonalcoholic [K76.0]   • Dental caries [K02.9]   • Common bile duct dilatation [K83.8]   • Elevated LFTs [R79.89]   • Depression [F32.9]   • Vitamin D deficiency [E55.9]   •  Diabetes mellitus type II, controlled (CMS-HCC) [E11.9]   • HTN (hypertension) [I10]   • HLD (hyperlipidemia) [E78.5]   • GERD (gastroesophageal reflux disease) [K21.9]   • Urinary incontinence with continuous leakage [N39.45]       Plan:  Left femur hardware infection with abscess  S/p removal 03/16/17  Fluid collection seen on CT  , CRP 22  OR gram stain +GPC, Cx - staph aureus  D/c'd zosyn and vanco 3/18  Continue cefazolin. Anticipate 6 week course from 3/16/17.   Plan for repeat I&D with liner exchange today   Follow OR cultures    Left septic knee arthritis  S/p arthrotomy and drainage of knee with liner exchange 3/16  Abx per above    MSSA bacteremia  3/16 Bcx - positive   3/17 Bcx - NGTD  Abx per above  Hold PICC until repeat bcx are negative x 48 hrs    DM2  Optimize BS control to assist with wound healing    DW IM  DW IM

## 2017-03-19 NOTE — PROGRESS NOTES
Received report, assumed pt care. Pt a&o 4, VSS, Assessment completed. Resting comfortably in bed with call light, bedside table in reach. No c/o at this time. Side rails up 2. Instructed to use call light when needing to get OOB verbalized understanding. Bed alarm refused, bed in low position. Will continue to monitor.

## 2017-03-19 NOTE — PROGRESS NOTES
Staph bacteremia with deep left thigh abscess with infected TKA, ? Acuity  WBC normal now, afebrile  2nd set of blood cultures with NGTD  Plan repeat I&D of knee/thigh today with exchange of poly liner  Left leg marked  Consent signed

## 2017-03-19 NOTE — OR SURGEON
Immediate Post-Operative Note      PreOp Diagnosis: Infected left TKA    PostOp Diagnosis: Same    Procedure(s):  IRRIGATION & DEBRIDEMENT ORTHO KNEE - Wound Class: Dirty or Infected  KNEE REVISION TOTAL FOR POLYETHYLENE LINER EXCHANGE - Wound Class: Dirty or Infected    Surgeon(s):  Rigo Irving M.D.    Anesthesiologist/Type of Anesthesia:  Anesthesiologist: Tobey B Gansert, M.D./General    Surgical Staff:  Circulator: Sarah Schafer R.N.  Limb Apodaca: Ituriel Andrea  Scrub Person: Leon Donato    Specimen: Fluid, synovium, bone (femur/tibia/patella) for culture    Estimated Blood Loss: 100 cc    Findings: No pus    Complications: None        3/19/2017 12:03 PM Rigo Irving

## 2017-03-19 NOTE — CARE PLAN
Problem: Knowledge Deficit  Goal: Knowledge of disease process/condition, treatment plan, diagnostic tests, and medications will improve  POC: I&D today

## 2017-03-20 ENCOUNTER — APPOINTMENT (OUTPATIENT)
Dept: RADIOLOGY | Facility: MEDICAL CENTER | Age: 67
DRG: 464 | End: 2017-03-20
Attending: NURSE PRACTITIONER
Payer: MEDICARE

## 2017-03-20 LAB
ANION GAP SERPL CALC-SCNC: 9 MMOL/L (ref 0–11.9)
BASOPHILS # BLD AUTO: 0.2 % (ref 0–1.8)
BASOPHILS # BLD: 0.02 K/UL (ref 0–0.12)
BUN SERPL-MCNC: 8 MG/DL (ref 8–22)
CALCIUM SERPL-MCNC: 9.4 MG/DL (ref 8.5–10.5)
CHLORIDE SERPL-SCNC: 97 MMOL/L (ref 96–112)
CO2 SERPL-SCNC: 26 MMOL/L (ref 20–33)
CREAT SERPL-MCNC: 0.81 MG/DL (ref 0.5–1.4)
EOSINOPHIL # BLD AUTO: 0.01 K/UL (ref 0–0.51)
EOSINOPHIL NFR BLD: 0.1 % (ref 0–6.9)
ERYTHROCYTE [DISTWIDTH] IN BLOOD BY AUTOMATED COUNT: 43.4 FL (ref 35.9–50)
GFR SERPL CREATININE-BSD FRML MDRD: >60 ML/MIN/1.73 M 2
GLUCOSE BLD-MCNC: 110 MG/DL (ref 65–99)
GLUCOSE BLD-MCNC: 114 MG/DL (ref 65–99)
GLUCOSE BLD-MCNC: 123 MG/DL (ref 65–99)
GLUCOSE SERPL-MCNC: 134 MG/DL (ref 65–99)
HCT VFR BLD AUTO: 23.4 % (ref 37–47)
HGB BLD-MCNC: 8.1 G/DL (ref 12–16)
IMM GRANULOCYTES # BLD AUTO: 0.12 K/UL (ref 0–0.11)
IMM GRANULOCYTES NFR BLD AUTO: 1.5 % (ref 0–0.9)
LYMPHOCYTES # BLD AUTO: 0.6 K/UL (ref 1–4.8)
LYMPHOCYTES NFR BLD: 7.4 % (ref 22–41)
MAGNESIUM SERPL-MCNC: 1.3 MG/DL (ref 1.5–2.5)
MCH RBC QN AUTO: 29.5 PG (ref 27–33)
MCHC RBC AUTO-ENTMCNC: 34.6 G/DL (ref 33.6–35)
MCV RBC AUTO: 85.1 FL (ref 81.4–97.8)
MONOCYTES # BLD AUTO: 0.83 K/UL (ref 0–0.85)
MONOCYTES NFR BLD AUTO: 10.3 % (ref 0–13.4)
NEUTROPHILS # BLD AUTO: 6.5 K/UL (ref 2–7.15)
NEUTROPHILS NFR BLD: 80.5 % (ref 44–72)
NRBC # BLD AUTO: 0.02 K/UL
NRBC BLD AUTO-RTO: 0.2 /100 WBC
PLATELET # BLD AUTO: 208 K/UL (ref 164–446)
PMV BLD AUTO: 10.5 FL (ref 9–12.9)
POTASSIUM SERPL-SCNC: 3 MMOL/L (ref 3.6–5.5)
RBC # BLD AUTO: 2.75 M/UL (ref 4.2–5.4)
SODIUM SERPL-SCNC: 132 MMOL/L (ref 135–145)
WBC # BLD AUTO: 8.1 K/UL (ref 4.8–10.8)

## 2017-03-20 PROCEDURE — 75989 ABSCESS DRAINAGE UNDER X-RAY: CPT

## 2017-03-20 PROCEDURE — 82962 GLUCOSE BLOOD TEST: CPT

## 2017-03-20 PROCEDURE — 700102 HCHG RX REV CODE 250 W/ 637 OVERRIDE(OP): Performed by: INTERNAL MEDICINE

## 2017-03-20 PROCEDURE — 99232 SBSQ HOSP IP/OBS MODERATE 35: CPT | Performed by: INTERNAL MEDICINE

## 2017-03-20 PROCEDURE — 85025 COMPLETE CBC W/AUTO DIFF WBC: CPT

## 2017-03-20 PROCEDURE — C1751 CATH, INF, PER/CENT/MIDLINE: HCPCS

## 2017-03-20 PROCEDURE — 80048 BASIC METABOLIC PNL TOTAL CA: CPT

## 2017-03-20 PROCEDURE — A9270 NON-COVERED ITEM OR SERVICE: HCPCS | Performed by: ORTHOPAEDIC SURGERY

## 2017-03-20 PROCEDURE — 83735 ASSAY OF MAGNESIUM: CPT

## 2017-03-20 PROCEDURE — A9270 NON-COVERED ITEM OR SERVICE: HCPCS | Performed by: INTERNAL MEDICINE

## 2017-03-20 PROCEDURE — 700111 HCHG RX REV CODE 636 W/ 250 OVERRIDE (IP): Performed by: INTERNAL MEDICINE

## 2017-03-20 PROCEDURE — 700102 HCHG RX REV CODE 250 W/ 637 OVERRIDE(OP): Performed by: ORTHOPAEDIC SURGERY

## 2017-03-20 PROCEDURE — 36569 INSJ PICC 5 YR+ W/O IMAGING: CPT

## 2017-03-20 PROCEDURE — 97535 SELF CARE MNGMENT TRAINING: CPT

## 2017-03-20 PROCEDURE — 700111 HCHG RX REV CODE 636 W/ 250 OVERRIDE (IP): Performed by: ORTHOPAEDIC SURGERY

## 2017-03-20 PROCEDURE — 36415 COLL VENOUS BLD VENIPUNCTURE: CPT

## 2017-03-20 PROCEDURE — 770006 HCHG ROOM/CARE - MED/SURG/GYN SEMI*

## 2017-03-20 PROCEDURE — 97530 THERAPEUTIC ACTIVITIES: CPT

## 2017-03-20 PROCEDURE — 97116 GAIT TRAINING THERAPY: CPT

## 2017-03-20 RX ORDER — POTASSIUM CHLORIDE 750 MG/1
30 TABLET, FILM COATED, EXTENDED RELEASE ORAL 2 TIMES DAILY
Status: DISCONTINUED | OUTPATIENT
Start: 2017-03-20 | End: 2017-03-27 | Stop reason: HOSPADM

## 2017-03-20 RX ADMIN — CEFAZOLIN SODIUM 2 G: 2 INJECTION, SOLUTION INTRAVENOUS at 15:10

## 2017-03-20 RX ADMIN — POTASSIUM CHLORIDE 30 MEQ: 750 TABLET, FILM COATED, EXTENDED RELEASE ORAL at 17:36

## 2017-03-20 RX ADMIN — OXYBUTYNIN CHLORIDE 10 MG: 5 TABLET ORAL at 20:25

## 2017-03-20 RX ADMIN — OXYCODONE HYDROCHLORIDE 10 MG: 10 TABLET ORAL at 01:22

## 2017-03-20 RX ADMIN — OMEPRAZOLE 20 MG: 20 CAPSULE, DELAYED RELEASE ORAL at 08:21

## 2017-03-20 RX ADMIN — LOSARTAN POTASSIUM 50 MG: 50 TABLET, FILM COATED ORAL at 08:21

## 2017-03-20 RX ADMIN — SERTRALINE 100 MG: 100 TABLET, FILM COATED ORAL at 08:21

## 2017-03-20 RX ADMIN — OXYCODONE HYDROCHLORIDE 10 MG: 10 TABLET ORAL at 08:30

## 2017-03-20 RX ADMIN — POLYETHYLENE GLYCOL 3350 1 PACKET: 17 POWDER, FOR SOLUTION ORAL at 20:30

## 2017-03-20 RX ADMIN — CEFAZOLIN SODIUM 2 G: 2 INJECTION, SOLUTION INTRAVENOUS at 05:44

## 2017-03-20 RX ADMIN — OXYCODONE HYDROCHLORIDE 5 MG: 5 TABLET ORAL at 20:25

## 2017-03-20 RX ADMIN — ASPIRIN 325 MG: 325 TABLET, DELAYED RELEASE ORAL at 08:21

## 2017-03-20 RX ADMIN — CEFAZOLIN SODIUM 2 G: 2 INJECTION, SOLUTION INTRAVENOUS at 22:45

## 2017-03-20 RX ADMIN — Medication 2 TABLET: at 20:25

## 2017-03-20 RX ADMIN — ENOXAPARIN SODIUM 40 MG: 100 INJECTION SUBCUTANEOUS at 08:21

## 2017-03-20 RX ADMIN — TRAZODONE HYDROCHLORIDE 150 MG: 150 TABLET ORAL at 20:26

## 2017-03-20 RX ADMIN — OXYCODONE HYDROCHLORIDE 10 MG: 10 TABLET ORAL at 12:54

## 2017-03-20 RX ADMIN — TRAZODONE HYDROCHLORIDE 150 MG: 150 TABLET ORAL at 01:24

## 2017-03-20 RX ADMIN — Medication 2 TABLET: at 08:21

## 2017-03-20 ASSESSMENT — ENCOUNTER SYMPTOMS
SHORTNESS OF BREATH: 0
DIZZINESS: 0
CHILLS: 0
DIARRHEA: 0
COUGH: 0
FEVER: 0
MYALGIAS: 1
NECK PAIN: 0
ABDOMINAL PAIN: 0
CONSTIPATION: 1
VOMITING: 0
NAUSEA: 0
DEPRESSION: 0
HEADACHES: 0
LOSS OF CONSCIOUSNESS: 0
BLURRED VISION: 0

## 2017-03-20 ASSESSMENT — PAIN SCALES - GENERAL
PAINLEVEL_OUTOF10: 7
PAINLEVEL_OUTOF10: 4
PAINLEVEL_OUTOF10: 2
PAINLEVEL_OUTOF10: 4
PAINLEVEL_OUTOF10: 5
PAINLEVEL_OUTOF10: 4
PAINLEVEL_OUTOF10: 4
PAINLEVEL_OUTOF10: 5

## 2017-03-20 ASSESSMENT — GAIT ASSESSMENTS
GAIT LEVEL OF ASSIST: STAND BY ASSIST
DISTANCE (FEET): 100
DEVIATION: ANTALGIC;STEP TO;BRADYKINETIC;SHUFFLED GAIT
ASSISTIVE DEVICE: FRONT WHEEL WALKER

## 2017-03-20 NOTE — PROGRESS NOTES
"Pt refused both fingerstick blood glucose tests this shift. Pt educated about the importance of controlling her blood sugar. Pt agreed to remain on the ADA diet, but refuses to \"keep getting poked all the time.\" Pt states that \"I don't check my sugar all the time at home. I'm fine.\"  "

## 2017-03-20 NOTE — OP REPORT
DATE OF SERVICE:  03/19/2017    PREOPERATIVE DIAGNOSES:  1.  Infected left total knee arthroplasty -- status post irrigation and   debridement.  2.  Left lateral thigh abscess -- status post irrigation and debridement and   hardware removal.  3.  History interprosthetic left distal femur fracture -- status post open   reduction and internal fixation.  4.  Diabetes mellitus.  5.  Recent dental abscess.  6.  Staph bacteremia -- resolving.    POSTOPERATIVE DIAGNOSES:  1.  Infected left total knee arthroplasty -- status post irrigation and   debridement.  2.  Left lateral thigh abscess -- status post irrigation and debridement and   hardware removal.  3.  History interprosthetic left distal femur fracture -- status post open   reduction and internal fixation.  4.  Diabetes mellitus.  5.  Recent dental abscess.  6.  Staph bacteremia -- resolving.    SURGICAL PROCEDURES:  1.  Irrigation and debridement of left knee with polyethylene liner exchange.  2.  Irrigation and debridement of left lateral thigh.  3.  Insertion of antibiotic-impregnated calcium phosphate beads to left knee   and left thigh.    SURGEON:  Rigo Irving MD    ASSISTANT:  None.    ANESTHESIOLOGIST:  Tobey B. Gansert, MD    ANESTHETIC:  General.    ESTIMATED BLOOD LOSS:  100 mL    TOURNIQUET TIME:  48 minutes.    IMPLANTS:    1. PFC Sigma Fixed Bearing Curved Tibial Insert, 2.5, 12.5 mm    INDICATIONS:  The patient is a 66-year-old who has had previous left total   knee arthroplasty and left total hip arthroplasty.  She also had a previous   left total hip arthroplasty infection and treated with a 2-stage exchange.    About a year and half ago, she fell and sustained interprosthetic distal femur   fracture, which was comminuted.  She underwent open reduction and internal   fixation, subsequently healed, but the plate was prominent and was bothersome   and about 2 months ago, she underwent removal of plate and reapplication of   new plate for  prophylactic treatment.  She was healing and was feeling much   better, but about 2 weeks ago, she had a dental abscess that was removed and   she was apparently given prescription for antibiotics, which she did not take.    On Wednesday morning of this week, she woke up with pain, swelling, and   redness in her left thigh, presented to the emergency room and then was sent   to the urgent care where her thigh was aspirated and showed pus.  She was   admitted to the hospital.  She had elevated white blood cell count and her   blood cultures were positive, was taken to the operating room the next day for   irrigation and debridement of the left thigh and knee.  The plate and screws   were removed from the lateral aspect of the distal femur.  They removed the   polyethylene liner to washout the back of the knee, but did not have the   appropriate arch for reapplication at that time, so the liner was soaked in   Betadine, reinserted, but had been damaged by the removal process.  We planned   to take her back today for repeat irrigation and debridement and exchange of   polyethylene liner for a new component.  Risks include bleeding, persistent or   worsening infection, pain, stiffness, thromboembolic phenomenon, fracture,   implant failure, anesthetic and medical complications, etc.    DESCRIPTION OF PROCEDURE:  The patient was identified in the preoperative   holding area.  Left leg was marked.  She was taken to the operating room where   general anesthetic was administered.  Intravenous antibiotics were given.    She was placed supine on the operating table.  Left lower extremity was then   prepped and draped in sterile fashion.  Time-out was held to confirm the   patient identity and correct surgical site.    A sterile tourniquet was placed on the left thigh.  Esmarch bandage was used   to exsanguinate the leg and the tourniquet was inflated to 275 mmHg.  Staples   were removed from the anterior knee incision and  then the incision was   reopened.  Vicryl sutures were removed.  The median parapatellar arthrotomy   was reopened as well and suture material was removed.  Hematoma/hemarthrosis   was expressed.  This was swabbed and sent for culture.  I dissected a little   bit more scar tissue off the posterior aspect of the quadriceps tendon and   patellar tendon to allow for eversion of the patella.  We obtained a bone from   the patella, distal femur, and tibia for 3 separate new tissue cultures, also   obtained some synovium from the back of the knee.  The polyethylene liner was   removed, irrigated the knee again with sterile saline via pulsatile lavage.    We then trialed with a 10 mm trial.  This fit well, but was a little bit   loose.  We inserted the same polyethylene liner, which was 10 mm, but this   would not lock, so we removed it, dissected more in the back of the knee,   mostly on the lateral side to ensure that there was nothing blocking the   liner.  We tried 2 more times to get the liner in, but it was felt at this   point that the liner had been damaged again by removing it.    We tried again with a 12.5 mm thick 2.5 mm insert.  This was impacted and then   we did note that this one was fully locked.    We then irrigated with the attachment of the lateral aspect of the thigh.    There was no pus that was noted.    A 10 mL of STIMULAN beads (1 g of vancomycin plus 240 mg of tobramycin per   batch) was instilled along the anterior aspect of the femur just above the   arthroplasty components and also up the lateral side of the femur.    Two Austin-Chavez drains were placed into the deep space, one into the knee   and one into the space under the IT band, both brought out proximally.  The   arthrotomy was closed with #1 Vicryl proximally down to the level of the   patellar tendon and then over the tibia was closed with 2-0 Vicryl.  The skin   was closed over a second more superficial drain, which was brought out    distally.  This was closed with 2-0 Vicryl and staples.  Dressings were   applied.  Calvin Gibbons rigid dressing was then applied to keep the knee in   full extension.  The patient was then extubated and taken to recovery room in   stable condition.    POSTOPERATIVE PLAN:  1.  Intravenous antibiotics and follow cultures.  2.  Weightbearing as tolerated.  3.  RJ dressing for approximately 10 days.  4.  Sequential drain removal over the next 2-3 days.  5.  DVT prophylaxis with foot pumps and aspirin.  6.  Postoperative AP and lateral radiographs of the left knee in the recovery   room.       ____________________________________     MD [facility] / DEVEN    DD:  03/19/2017 15:26:15  DT:  03/19/2017 17:18:44    D#:  [id number]  Job#:  [id number]

## 2017-03-20 NOTE — PROGRESS NOTES
Infectious Disease Progress Note    Author: JIMMIE Roche DOS & Time created: 3/20/2017  9:51 AM    Chief Complaint:  Chief Complaint   Patient presents with   • Leg Pain     L   FU for left femoral hardware infection and left septic knee arthritis.  S/p I&D to L knee and thigh, with placement of abx beads to L knee and thigh on 3/19 with Dr. Irving.    Interval History:  3/18 AF, WBC 7.2, states her drains were removed yesterday, is willing to work with PT, pain stable and will return to the OR early next week, Bx - MSSA  3/19 AF, no CBC, plan to return to OR today for repeat I&D and liner exchange  3/20 Tm 99.7, WBC 8.1.  Pain is continuous and severe, 10/10 with movement and 6/10 at rest.  Labs Reviewed, Medications Reviewed, Radiology Reviewed and Wound Reviewed.    Review of Systems:  Review of Systems   Constitutional: Negative for fever and chills.   Respiratory: Negative for cough and shortness of breath.    Cardiovascular: Negative for chest pain.   Gastrointestinal: Positive for constipation. Negative for nausea, vomiting, abdominal pain and diarrhea.        Last BM was last Monday   Genitourinary: Negative for dysuria.   Musculoskeletal: Positive for joint pain.   Skin: Negative for rash.   Neurological: Negative for headaches.       Hemodynamics:  Temp (24hrs), Av.9 °C (98.4 °F), Min:36.4 °C (97.5 °F), Max:37.6 °C (99.7 °F)  Temperature: 36.5 °C (97.7 °F)  Pulse  Av.7  Min: 53  Max: 97Heart Rate (Monitored): 66  Blood Pressure : 117/80 mmHg, NIBP: (!) 174/104 mmHg (MD notified)       Physical Exam:  Physical Exam   Constitutional: She is oriented to person, place, and time. She appears well-developed and well-nourished. No distress.   Obese   HENT:   Head: Normocephalic and atraumatic.   Eyes: EOM are normal. Pupils are equal, round, and reactive to light.   Neck: Normal range of motion. Neck supple.   Cardiovascular: Normal rate, regular rhythm and normal heart sounds.     Pulmonary/Chest: Effort normal and breath sounds normal. No respiratory distress.   Abdominal: Soft. Bowel sounds are normal. She exhibits no distension. There is no tenderness.   Musculoskeletal: She exhibits tenderness.   LLE- rigid original surgical dressing in place with 3 JULIO CÉSAR drains, scant bloody drainage.  Toes warm.  R knee surgical scar clean   Neurological: She is alert and oriented to person, place, and time.   Skin: Skin is warm and dry. No erythema.   Nursing note and vitals reviewed.      Meds:    Current facility-administered medications:   •  tobramycin (NEBCIN) injection 1.2 g, 1.2 g, Other, Intra-Op Once PRN, Rigo Irving M.D.  •  aspirin EC (ECOTRIN) tablet 325 mg, 325 mg, Oral, DAILY, Rigo Irving M.D., 325 mg at 03/20/17 0821  •  hydrALAZINE (APRESOLINE) injection 10 mg, 10 mg, Intravenous, Q6HRS PRN, David Sylvester M.D., 10 mg at 03/19/17 1615  •  insulin lispro (HUMALOG) injection 1-6 Units, 1-6 Units, Subcutaneous, 4X/DAY ACHS, David Sylvester M.D., 1 Units at 03/19/17 2100  •  ceFAZolin (ANCEF) IVPB 2 g, 2 g, Intravenous, Q8HRS, Teagan Rosas M.D., 2 g at 03/20/17 0544  •  oxycodone immediate-release (ROXICODONE) tablet 5 mg, 5 mg, Oral, Q4HRS PRN, 5 mg at 03/19/17 1513 **OR** oxycodone immediate release (ROXICODONE) tablet 10 mg, 10 mg, Oral, Q4HRS PRN, David Sylvester M.D., 10 mg at 03/20/17 0830  •  Action is required: Protocol 1073 Hypoglycemia has been implemented, , , Once **AND** Protocol 1073 Inclusion Criteria, , , CONTINUOUS **AND** Protocol 1073 NOTIFY, , , Once **AND** Protocol 1073 Initiate protocol immediately if FSBG is less than or equal to 70 mg/dL, , , CONTINUOUS **AND** glucose 4 g chewable tablet 16 g, 16 g, Oral, Q15 MIN PRN **AND** dextrose 50% (D50W) injection 25 mL, 25 mL, Intravenous, Q15 MIN PRN, David Sylvester M.D.  •  enoxaparin (LOVENOX) inj 40 mg, 40 mg, Subcutaneous, DAILY, Rigo Irving M.D., 40 mg at 03/20/17 2629  •  losartan  (COZAAR) tablet 50 mg, 50 mg, Oral, DAILY, Codi Singh M.D., 50 mg at 03/20/17 0821  •  omeprazole (PRILOSEC) capsule 20 mg, 20 mg, Oral, DAILY, Codi Singh M.D., 20 mg at 03/20/17 0821  •  oxybutynin (DITROPAN) tablet 10 mg, 10 mg, Oral, QHS, Codi Singh M.D., 10 mg at 03/19/17 2054  •  sertraline (ZOLOFT) tablet 100 mg, 100 mg, Oral, DAILY, Codi Singh M.D., 100 mg at 03/20/17 0821  •  trazodone (DESYREL) tablet 150 mg, 150 mg, Oral, QHS PRN, Codi Singh M.D., 150 mg at 03/20/17 0124  •  tramadol (ULTRAM) 50 MG tablet 50 mg, 50 mg, Oral, Q6HRS PRN, Codi Singh M.D.  •  senna-docusate (PERICOLACE or SENOKOT S) 8.6-50 MG per tablet 2 Tab, 2 Tab, Oral, BID, 2 Tab at 03/20/17 0821 **AND** polyethylene glycol/lytes (MIRALAX) PACKET 1 Packet, 1 Packet, Oral, QDAY PRN **AND** magnesium hydroxide (MILK OF MAGNESIA) suspension 30 mL, 30 mL, Oral, QDAY PRN **AND** bisacodyl (DULCOLAX) suppository 10 mg, 10 mg, Rectal, QDAY PRN, Codi Singh M.D.  •  ondansetron (ZOFRAN) syringe/vial injection 4 mg, 4 mg, Intravenous, Q4HRS PRN, Codi Singh M.D.  •  ondansetron (ZOFRAN ODT) dispertab 4 mg, 4 mg, Oral, Q4HRS PRN, Codi Singh M.D.  •  morphine (pf) 4 mg/ml injection 2-4 mg, 2-4 mg, Intravenous, Q2HRS PRN, Codi Singh M.D., 4 mg at 03/17/17 0849  •  NS infusion 2,586 mL, 30 mL/kg, Intravenous, Once PRN, Codi Singh M.D.  •  NS (BOLUS) infusion 500 mL, 500 mL, Intravenous, Once PRN, Codi Singh M.D., Stopped at 03/17/17 0846    Labs:  Recent Labs      03/18/17   0312  03/20/17   0127   WBC  7.2  8.1   RBC  3.16*  2.75*   HEMOGLOBIN  9.2*  8.1*   HEMATOCRIT  27.3*  23.4*   MCV  86.4  85.1   MCH  29.1  29.5   RDW  44.4  43.4   PLATELETCT  189  208   MPV  10.5  10.5   NEUTSPOLYS   --   80.50*   LYMPHOCYTES   --   7.40*   MONOCYTES   --   10.30   EOSINOPHILS   --   0.10   BASOPHILS   --   0.20     Recent Labs      03/18/17   0312  03/20/17   0127   SODIUM  131*  132*   POTASSIUM  3.4*   3.0*   CHLORIDE  99  97   CO2  21  26   GLUCOSE  116*  134*   BUN  14  8     Recent Labs      03/18/17   0312  03/20/17   0127   ALBUMIN  3.4   --    TBILIRUBIN  1.4   --    ALKPHOSPHAT  90   --    TOTPROTEIN  6.4   --    ALTSGPT  65*   --    ASTSGOT  43   --    CREATININE  0.96  0.81       Imaging:  Ct-extremity, Lower With Left    3/16/2017  3/16/2017 1:18 AM HISTORY/REASON FOR EXAM:  Atraumatic Pain/Swelling/Deformity. Left lateral thigh fluid collection Preoperative evaluation TECHNIQUE/EXAM DESCRIPTION AND NUMBER OF VIEWS:  CT scan of the LEFT lower extremity with contrast, with reconstructions. Thin helical 3 mm sections were obtained from the distal femur through the proximal tibia/fibula. Sagittal and coronal multiplanar reconstructions were generated from the axial images. A total of 100 mL of Omnipaque 350 nonionic contrast was administered  IV without complication. Up to date radiation dose reduction adjustments have been utilized to meet ALARA standards for radiation dose reduction. COMPARISON: Left lower extremity ultrasound 3/15/2017 FINDINGS: There is a left hip arthroplasty present There is left femoral hardware present. There is a left knee arthroplasty. There is a fluid collection in the left lateral thigh. It is significantly obscured by artifact from the hardware. It is probably better assessed on the ultrasound 3/16/2017. There does however appear to be a mid/distal thigh fluid collection which by CT measures approximately 15 cm in length. Location is probably subcutaneous.     3/16/2017  1.  Limited assessment of left lateral thigh fluid collection due to artifact secondary to hardware 2.  Fluid collection is at least 15 cm in length and probably subcutaneous in location 3.  Left hip arthroplasty, left knee arthroplasty, left femoral hardware present    Us-liver And Biliary Tree    3/16/2017  3/16/2017 4:25 AM HISTORY/REASON FOR EXAM:  Abnormal liver function test Abdominal pain TECHNIQUE/EXAM  DESCRIPTION AND NUMBER OF VIEWS:  Real-time sonography of the liver and biliary tree. COMPARISON: Abdominal ultrasound 6/16/2016 FINDINGS: The liver is normal in contour with increased echogenicity. There is no evidence of solid mass lesion. The liver measures 15.7 cm. The gallbladder is absent. The common duct measures 1.5 cm. The visualized pancreas is unremarkable. There is aortic atherosclerosis. Intrahepatic IVC is patent. The portal vein is patent with hepatopetal flow. The right kidney measures 8.1 cm. There is no ascites.     3/16/2017  1.  Prior cholecystectomy 2.  Moderate biliary dilation with common bile duct measuring 1.5 cm 3.  Fatty infiltration of the liver     Dx-portable Fluoroscopy < 1 Hour    3/16/2017  3/16/2017 6:13 PM HISTORY/REASON FOR EXAM:  Left hip irrigation and debridement. Left lateral thigh fluid collection. Left hip arthroplasty. TECHNIQUE/EXAM DESCRIPTION AND NUMBER OF VIEWS:  1 views of the LEFT hip. COMPARISON: CT scan 3/16/2017 FINDINGS: Single fluoroscopic image demonstrates a needle projecting over the left hip arthroplasty laterally. Fluoroscopy time of 6 seconds  was utilized by THIEN CORREA for this procedure.     3/16/2017  Intraoperative fluoroscopic spot images as described above.    Le Venous Duplex (specify In Comments Left, Right Or Bilateral)    3/16/2017   Vascular Laboratory  CONCLUSIONS  1.  Normal left lower extremity superficial and deep venous examination.  2.  In the left lateral thigh deep to the incision  There is a large complex fluid collection measuring 17.3 cm in length and  5.9 x 3.5 cm transversely. Differential includes postoperative seroma,  hematoma, or infected fluid.  ROGERS STRICKLAND  Exam Date:     03/15/2017 22:52  Room #:     Inpatient  Priority:     Stat  Ht (in):             Wt (lb):  Ordering Physician:        ANIBAL VAUGHN  Referring Physician:       [id number]  Sonographer:               Mayur Manuel                              JYOTI, RVT  Study Type:                Complete Unilateral  Technical Quality:         Adequate  Age:    66    Gender:     F  MRN:    [id number]  :    1950      BSA:  Indications:     Pain in left leg  CPT Codes:       12792  ICD Codes:       L70987  History:         Pain and swelling of the left thigh  Limitations:  PROCEDURES:  Left lower extremity venous duplex imaging.  The following venous structures were evaluated: common femoral, profunda  femoral, greater saphenous, femoral, popliteal , peroneal and posterior  tibial veins.  Serial compression, augmentation maneuvers,  color and spectral Doppler  flow evaluations were performed.  FINDINGS:  Left lower extremity -.  Complete color filling and compressibility with normal venous flow dynamics  including spontaneous flow, response to augmentation maneuvers, and  respiratory phasicity.  No superficial or deep venous thrombosis.  The peroneal and posterior tibial veins are difficult to assess for  compressibility, but flow response to augmentation is demonstrated.  Lateral left thigh at the area of concern: 17.3x5.9x3.5cm complex fluid  collection. This area is at a previous incision site.  Flow was evaluated in the contralateral common femoral vein and normal  venous flow dynamics including spontaneous flow, respiratory phasic  variation and augmentation were demonstrated.  Zander Cruz  (Electronically Signed)  Final Date:      2017                   00:09    Dx-hip-unilateral-without Pelvis-1 View Left    3/16/2017  3/16/2017 6:13 PM HISTORY/REASON FOR EXAM:  Left hip irrigation and debridement. Left lateral thigh fluid collection. Left hip arthroplasty. TECHNIQUE/EXAM DESCRIPTION AND NUMBER OF VIEWS:  1 views of the LEFT hip. COMPARISON: CT scan 3/16/2017 FINDINGS: Single fluoroscopic image demonstrates a needle projecting over the left hip arthroplasty laterally. Fluoroscopy time of 6 seconds  was utilized by THIEN CORREA for this  procedure.     3/16/2017  Intraoperative fluoroscopic spot images as described above.      Micro:  BLOOD CULTURE   Date Value Ref Range Status   03/17/2017   Preliminary    No Growth    Note: Blood cultures are incubated for 5 days and  are monitored continuously.Positive blood cultures  are called to the RN and reported as soon as  they are identified.          Assessment:  Active Hospital Problems    Diagnosis   • *Fluid collection at surgical site [T88.8XXA]   • Staphylococcus aureus bacteremia [R78.81]   • Fatty liver disease, nonalcoholic [K76.0]   • Dental caries [K02.9]   • Common bile duct dilatation [K83.8]   • Elevated LFTs [R79.89]   • Depression [F32.9]   • Vitamin D deficiency [E55.9]   • Diabetes mellitus type II, controlled (CMS-HCC) [E11.9]   • HTN (hypertension) [I10]   • HLD (hyperlipidemia) [E78.5]   • GERD (gastroesophageal reflux disease) [K21.9]   • Urinary incontinence with continuous leakage [N39.45]       Plan:  Left femur hardware infection with abscess  S/p removal 03/16/17  Fluid collection seen on CT  , CRP 22  OR gram stain +GPC, Cx - MSSA  D/c'd zosyn and vanco 3/18  Continue IV Cefazolin. Anticipate 6 week course from 3/16/17, end date 4/27/17.  S/p repeat I&D with liner exchange on 3/19 with abx bead placement  OR cx- pending     Left septic knee arthritis  S/p arthrotomy and drainage of knee with liner exchange 3/16  S/p repeat I&D as above.  Abx per above    MSSA bacteremia  3/16 Bcx - positive   3/17 Bcx - NGTD  Abx per above  PICC ordered, Bcx neg >48 hrs.    DM2  Optimize BS control to assist with wound healing, DM can worsen infection.

## 2017-03-20 NOTE — PROGRESS NOTES
Hospital Medicine Progress Note, Adult, Complex               Author: David Sylvester Date & Time created: 3/20/2017  3:43 PM     Interval History:  65 y/o female with LLE fluid collection    LLE fluid collection-s/p arthrotomy, hardware removal and arthrotomy and drainage of the left knee with liner exchange POD#3 Doing well. Still has serosanguinous drainage. PICC line placed today.    Elevated LFTS-resolved    Review of Systems:  Review of Systems   Constitutional: Positive for malaise/fatigue (about the same). Negative for fever.   Eyes: Negative for blurred vision.   Respiratory: Negative for shortness of breath.    Cardiovascular: Negative for chest pain and leg swelling.   Gastrointestinal: Negative for nausea, vomiting and abdominal pain.   Genitourinary: Negative for dysuria.   Musculoskeletal: Positive for myalgias and joint pain. Negative for neck pain.        Pain is a bit better today   Skin: Negative for itching.   Neurological: Negative for dizziness, loss of consciousness and headaches.   Psychiatric/Behavioral: Negative for depression.   All other systems reviewed and are negative.      Physical Exam:  Physical Exam   Constitutional: She is oriented to person, place, and time. She appears well-developed and well-nourished. No distress.   Much calmer today   HENT:   Head: Normocephalic and atraumatic.   Mouth/Throat: No oropharyngeal exudate.   Very poor dentition   Eyes: Pupils are equal, round, and reactive to light. No scleral icterus.   Neck: Normal range of motion. Neck supple.   Cardiovascular: Normal rate, regular rhythm, normal heart sounds and intact distal pulses.    No murmur heard.  Pulmonary/Chest: Effort normal and breath sounds normal. No stridor. She has no wheezes. She has no rales.   Abdominal: Soft. Bowel sounds are normal. She exhibits no distension.   Musculoskeletal: Normal range of motion. She exhibits edema and tenderness.   Multiple surgical scars, bandages in place, edema,  moderate TTP, warm peripherally, no changes today   Neurological: She is alert and oriented to person, place, and time. No cranial nerve deficit.   Skin: Skin is warm and dry. No rash noted.   Psychiatric: She has a normal mood and affect.   Nursing note and vitals reviewed.      Labs:        Invalid input(s): [id number]      Recent Labs      17   0127   SODIUM  131*  132*   POTASSIUM  3.4*  3.0*   CHLORIDE  99  97   CO2  21  26   BUN  14  8   CREATININE  0.96  0.81   MAGNESIUM   --   1.3*   CALCIUM  9.6  9.4     Recent Labs      17   0127   ALTSGPT  65*   --    ASTSGOT  43   --    ALKPHOSPHAT  90   --    TBILIRUBIN  1.4   --    GLUCOSE  116*  134*     Recent Labs      17   0127   RBC  3.16*  2.75*   HEMOGLOBIN  9.2*  8.1*   HEMATOCRIT  27.3*  23.4*   PLATELETCT  189  208     Recent Labs      17   0127   WBC  7.2  8.1   NEUTSPOLYS   --   80.50*   LYMPHOCYTES   --   7.40*   MONOCYTES   --   10.30   EOSINOPHILS   --   0.10   BASOPHILS   --   0.20   ASTSGOT  43   --    ALTSGPT  65*   --    ALKPHOSPHAT  90   --    TBILIRUBIN  1.4   --            Hemodynamics:  Temp (24hrs), Av.6 °C (97.9 °F), Min:36.4 °C (97.5 °F), Max:36.9 °C (98.5 °F)  Temperature: 36.6 °C (97.9 °F)  Pulse  Av.3  Min: 53  Max: 97   Blood Pressure : 144/85 mmHg     Respiratory:    Respiration: 16, Pulse Oximetry: 93 %           Fluids:    Intake/Output Summary (Last 24 hours) at 17 1543  Last data filed at 17 0838   Gross per 24 hour   Intake    120 ml   Output    135 ml   Net    -15 ml        GI/Nutrition:  Orders Placed This Encounter   Procedures   • DIET ORDER     Standing Status: Standing      Number of Occurrences: 1      Standing Expiration Date:      Order Specific Question:  Diet:     Answer:  Low Fiber(GI Soft) [2]     Order Specific Question:  Diet:     Answer:  Diabetic [3]     Medical Decision Making, by Problem:  Active  "Hospital Problems    Diagnosis   • *Fluid collection at surgical site [T88.8XXA]-c/f infection, doing well  -s/p hardware removal and arthrotomy and drainage of the left knee with liner exchange and I&D POD#4  -repeat I&D today with liner exchange  -Start IV ancef given most likely bacteria is MSSA, likely 6 week course, cant do PICC line for at least 24 more hours, F/U final blood cultures, likely tomorrow  -F/U new intra-op cultures collected. No organisms so far.  -continue multi-modal pain therapy including IV narcotics PRN   • Fatty liver disease, nonalcoholic [K76.0]-encourage weight loss   • Dental caries [K02.9]-recent teeth extraction, monitor   • Common bile duct dilatation [K83.8]-unclear significance, no belly pain, LFT\"s down-trending, monitor, continues to improve today   • Elevated LFTs [R79.89]-2/2 ishcemia, versus toxicity, APAP levels are ok, down-trending continues   • Depression [F32.9]-outpatient treatment, zoloft   • Vitamin D deficiency [E55.9]-replacement   • Diabetes mellitus type II, controlled (CMS-HCC) [E11.9]-monitor surgars closely. Doing well currently again  -ISS, adjust PRN   • HTN (hypertension) [I10]-well controlled, continue current meds   • HLD (hyperlipidemia) [E78.5]-statin   • GERD (gastroesophageal reflux disease) [K21.9]-PPI   • Urinary incontinence with continuous leakage [N39.45]-ditropan     Staph bacteremia-all 4/4, likely from LLE  -confirmed MSSA  -continue ancef as described above  -ECHO with no e/o valvular vegetations    Hypokalemia-replace Oral today  -check in the AM    Labs reviewed and Medications reviewed  Shannon catheter: No Shannon        DVT prophylaxis - mechanical: SCDs      Assessed for rehab: Patient was assess for and/or received rehabilitation services during this hospitalization        "

## 2017-03-20 NOTE — THERAPY
"Occupational Therapy Treatment completed with focus on ADLs and ADL transfers.  Functional Status:  Pt pleasant & motivated.  Pt was Min A for supine to sit EOB.  Pt stood with CGA & FWW.  Pt limited by cast on LLE that keeps her knee in ext.  Pt able to mobilize with FWW & SBA.  Pt was CGA for ADL transfers.  Pt left up in chair for lunch with LLE elevated.  Plan of Care: Will benefit from Occupational Therapy 3 times per week  Discharge Recommendations:  Equipment Will Continue to Assess for Equipment Needs. Post-acute therapy Discharge to a transitional care facility for continued skilled therapy services.    See \"Rehab Therapy-Acute\" Patient Summary Report for complete documentation.   "

## 2017-03-20 NOTE — DISCHARGE PLANNING
TCN met with patient at bedside to discuss the care teams recommendation for SNF. Patient is agreeable to suggestion and would like to DC to a [place] SNF. Patient elected to send choice to all [facility]. Choice signed and faxed to [facility]. TCN to follow as needed.

## 2017-03-20 NOTE — PROGRESS NOTES
Received shift report from amada RN and assumed care of this pt at 0715. Pt AOx4 . Pt reports pain is 7/10 to LLE - Recently medicated prn per MAR. Pt in bed declining to get up for breakfast, . Pt is appropriately with call light when needing assistance. Bed alarm declined by pt, education on using call light when needing asasistacne. Pt compliant with teachings. PIV assessed and is patent, CDI, Running IVF. Pt is on RA with SaO2 at 95.  Discussed POC for day shift,  comfort, and safety. Patient has call light and personal belongings within reach. Safety and fall precautions in place. Reviewed orders, notes, labs, and test results. Hourly rounding in place with RN rounding on odd hours and CNA on even hours.

## 2017-03-20 NOTE — THERAPY
"Physical Therapy Evaluation completed.   Bed Mobility:  Supine to Sit: Minimal Assist  Transfers: Sit to Stand: Contact Guard Assist  Gait: Level Of Assist: Stand by Assist with Front-Wheel Walker       Plan of Care: Will benefit from Physical Therapy 3 times per week  Discharge Recommendations: Equipment: Will Continue to Assess for Equipment Needs. Post-acute therapy Discharge to a transitional care facility for continued skilled therapy services.    See \"Rehab Therapy-Acute\" Patient Summary Report for complete documentation.     "

## 2017-03-20 NOTE — PROGRESS NOTES
PICC Insertion Procedure Note    PICC necessary for longterm antibiotics. Consent confirmed, vessel patency confirmed with ultrasound. Risks and benefits of procedure explained to patient and education regarding central line associated bloodstream infections provided. Questions answered.     PICC placed in RUE per MD order with ultrasound guidance. 4 Fr, single lumen PICC placed in basilic vein after 1 attempt(s). 3 cc's of 1% lidocaine injected intradermally, 21 gauge microintroducer needle and modified Seldinger technique used. 38 cm total catheter length, 1 cm external measurement inserted with good blood return. Each lumen flushed without resistance with 10 mL 0.9% normal saline. PICC line secured with Biopatch and Tegaderm.    PICC tip location in the SVC confirmed by ECG technology. Pt tolerated procedure quite well.  Patient condition relayed to unit RN or ordering physician via this post procedure note in the EMR.     [employer] Power PICC ref # [id number], Lot # [id number]

## 2017-03-20 NOTE — PROGRESS NOTES
DATE OF SERVICE:  03/20/2017    TIME:  12:25 p.m.    HISTORY:  The patient is sitting in the chair.  She feels fine.  She is able   to dorsiflex and plantarflex her toes.  Her Calvin Gibbons dressing is intact.    She is afebrile with stable vital signs.    LABORATORY DATA:  Labs today show white blood cell count of 8100, hematocrit   23.4%, platelet count 208,000.  Sodium 132, potassium 3.0, creatinine 0.81.    Drain output is 5 out of #1, 120 out of #2, and 10 out of #3.    Cultures from 03/19/2017 are pending.    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    PLAN:  Continue with the drains in place.  We will start to remove them   tomorrow.  Continue with intravenous antibiotics.  We will leave her in the   Calvin Gibbons dressing until next Monday.  Deep venous thrombosis prophylaxis   with foot pumps while she is in bed, ankle exercises and aspirin.  Anticipate   discharge potentially on Wednesday or Thursday.       ____________________________________     MD [facility] / DEVEN    DD:  03/20/2017 13:08:50  DT:  03/20/2017 13:30:00    D#:  [id number]  Job#:  [id number]

## 2017-03-21 LAB
ANION GAP SERPL CALC-SCNC: 10 MMOL/L (ref 0–11.9)
APAP SERPL-MCNC: <10 UG/ML (ref 10–30)
BACTERIA TISS AEROBE CULT: ABNORMAL
BACTERIA WND AEROBE CULT: ABNORMAL
BUN SERPL-MCNC: 9 MG/DL (ref 8–22)
CALCIUM SERPL-MCNC: 9.9 MG/DL (ref 8.5–10.5)
CHLORIDE SERPL-SCNC: 96 MMOL/L (ref 96–112)
CO2 SERPL-SCNC: 27 MMOL/L (ref 20–33)
CREAT SERPL-MCNC: 0.88 MG/DL (ref 0.5–1.4)
GFR SERPL CREATININE-BSD FRML MDRD: >60 ML/MIN/1.73 M 2
GLUCOSE BLD-MCNC: 112 MG/DL (ref 65–99)
GLUCOSE BLD-MCNC: 115 MG/DL (ref 65–99)
GLUCOSE BLD-MCNC: 128 MG/DL (ref 65–99)
GLUCOSE BLD-MCNC: 144 MG/DL (ref 65–99)
GLUCOSE SERPL-MCNC: 153 MG/DL (ref 65–99)
GRAM STN SPEC: ABNORMAL
MAGNESIUM SERPL-MCNC: 1.2 MG/DL (ref 1.5–2.5)
POTASSIUM SERPL-SCNC: 4 MMOL/L (ref 3.6–5.5)
SALICYLATES SERPL-MCNC: 0 MG/DL (ref 15–25)
SIGNIFICANT IND 70042: ABNORMAL
SITE SITE: ABNORMAL
SODIUM SERPL-SCNC: 133 MMOL/L (ref 135–145)
SOURCE SOURCE: ABNORMAL

## 2017-03-21 PROCEDURE — A9270 NON-COVERED ITEM OR SERVICE: HCPCS | Performed by: INTERNAL MEDICINE

## 2017-03-21 PROCEDURE — 700111 HCHG RX REV CODE 636 W/ 250 OVERRIDE (IP): Performed by: ORTHOPAEDIC SURGERY

## 2017-03-21 PROCEDURE — 80048 BASIC METABOLIC PNL TOTAL CA: CPT

## 2017-03-21 PROCEDURE — 80307 DRUG TEST PRSMV CHEM ANLYZR: CPT

## 2017-03-21 PROCEDURE — 770006 HCHG ROOM/CARE - MED/SURG/GYN SEMI*

## 2017-03-21 PROCEDURE — A9270 NON-COVERED ITEM OR SERVICE: HCPCS | Performed by: HOSPITALIST

## 2017-03-21 PROCEDURE — 700102 HCHG RX REV CODE 250 W/ 637 OVERRIDE(OP): Performed by: HOSPITALIST

## 2017-03-21 PROCEDURE — 82962 GLUCOSE BLOOD TEST: CPT

## 2017-03-21 PROCEDURE — 83735 ASSAY OF MAGNESIUM: CPT

## 2017-03-21 PROCEDURE — G0480 DRUG TEST DEF 1-7 CLASSES: HCPCS

## 2017-03-21 PROCEDURE — 700111 HCHG RX REV CODE 636 W/ 250 OVERRIDE (IP): Performed by: INTERNAL MEDICINE

## 2017-03-21 PROCEDURE — 99233 SBSQ HOSP IP/OBS HIGH 50: CPT | Performed by: HOSPITALIST

## 2017-03-21 PROCEDURE — 700102 HCHG RX REV CODE 250 W/ 637 OVERRIDE(OP): Performed by: INTERNAL MEDICINE

## 2017-03-21 RX ORDER — POTASSIUM CHLORIDE 20 MEQ/1
40 TABLET, EXTENDED RELEASE ORAL ONCE
Status: COMPLETED | OUTPATIENT
Start: 2017-03-21 | End: 2017-03-21

## 2017-03-21 RX ORDER — FLUMAZENIL 0.1 MG/ML
INJECTION INTRAVENOUS
Status: DISCONTINUED
Start: 2017-03-21 | End: 2017-03-21

## 2017-03-21 RX ADMIN — Medication 2 TABLET: at 21:23

## 2017-03-21 RX ADMIN — Medication 2 TABLET: at 08:14

## 2017-03-21 RX ADMIN — LOSARTAN POTASSIUM 50 MG: 50 TABLET, FILM COATED ORAL at 08:14

## 2017-03-21 RX ADMIN — MAGNESIUM HYDROXIDE 30 ML: 400 SUSPENSION ORAL at 21:33

## 2017-03-21 RX ADMIN — OXYCODONE HYDROCHLORIDE 10 MG: 10 TABLET ORAL at 17:14

## 2017-03-21 RX ADMIN — OXYCODONE HYDROCHLORIDE 10 MG: 10 TABLET ORAL at 12:08

## 2017-03-21 RX ADMIN — POTASSIUM CHLORIDE 40 MEQ: 1500 TABLET, EXTENDED RELEASE ORAL at 12:10

## 2017-03-21 RX ADMIN — POTASSIUM CHLORIDE 30 MEQ: 750 TABLET, FILM COATED, EXTENDED RELEASE ORAL at 21:23

## 2017-03-21 RX ADMIN — TRAMADOL HYDROCHLORIDE 50 MG: 50 TABLET, COATED ORAL at 08:30

## 2017-03-21 RX ADMIN — POTASSIUM CHLORIDE 30 MEQ: 750 TABLET, FILM COATED, EXTENDED RELEASE ORAL at 08:14

## 2017-03-21 RX ADMIN — POLYETHYLENE GLYCOL 3350 1 PACKET: 17 POWDER, FOR SOLUTION ORAL at 08:20

## 2017-03-21 RX ADMIN — OXYCODONE HYDROCHLORIDE 10 MG: 10 TABLET ORAL at 21:24

## 2017-03-21 RX ADMIN — CEFAZOLIN SODIUM 2 G: 2 INJECTION, SOLUTION INTRAVENOUS at 21:26

## 2017-03-21 RX ADMIN — CEFAZOLIN SODIUM 2 G: 2 INJECTION, SOLUTION INTRAVENOUS at 08:23

## 2017-03-21 RX ADMIN — SERTRALINE 100 MG: 100 TABLET, FILM COATED ORAL at 08:14

## 2017-03-21 RX ADMIN — OXYBUTYNIN CHLORIDE 10 MG: 5 TABLET ORAL at 21:23

## 2017-03-21 RX ADMIN — OMEPRAZOLE 20 MG: 20 CAPSULE, DELAYED RELEASE ORAL at 08:23

## 2017-03-21 RX ADMIN — TRAZODONE HYDROCHLORIDE 150 MG: 150 TABLET ORAL at 21:24

## 2017-03-21 RX ADMIN — ENOXAPARIN SODIUM 40 MG: 100 INJECTION SUBCUTANEOUS at 08:14

## 2017-03-21 RX ADMIN — CEFAZOLIN SODIUM 2 G: 2 INJECTION, SOLUTION INTRAVENOUS at 17:14

## 2017-03-21 ASSESSMENT — PAIN SCALES - GENERAL
PAINLEVEL_OUTOF10: 6
PAINLEVEL_OUTOF10: 5
PAINLEVEL_OUTOF10: 6
PAINLEVEL_OUTOF10: 6
PAINLEVEL_OUTOF10: 5
PAINLEVEL_OUTOF10: 4
PAINLEVEL_OUTOF10: 5

## 2017-03-21 ASSESSMENT — ENCOUNTER SYMPTOMS
BACK PAIN: 0
DIZZINESS: 0
EYE PAIN: 0
ABDOMINAL PAIN: 0
DIARRHEA: 0
DEPRESSION: 0
BLURRED VISION: 0
COUGH: 0
HEADACHES: 0
VOMITING: 0
TINGLING: 0
SORE THROAT: 0
NECK PAIN: 0
FEVER: 0
SHORTNESS OF BREATH: 0
PALPITATIONS: 0
INSOMNIA: 0
CONSTIPATION: 1
CHILLS: 0
NAUSEA: 0

## 2017-03-21 NOTE — CARE PLAN
Problem: Nutritional:  Goal: Patient to verbalize or demonstrate understanding of diet  Outcome: NOT MET  Pt states she understands Diabetic diet well and has had education in past. Pt declined verbal education on Diabetic diet but accepted handouts she wishes to read on her own. RD encouraged pt to contact us if she wishes to have more detailed education. RD available PRN.

## 2017-03-21 NOTE — PROGRESS NOTES
Infectious Disease Progress Note    Author: JIMMIE Roche DOS & Time created: 3/21/2017  11:33 AM    Chief Complaint:  Chief Complaint   Patient presents with   • Leg Pain     L   FU for left femoral hardware infection and left septic knee arthritis.  S/p I&D to L knee and thigh, with placement of abx beads to L knee and thigh on 3/19 with Dr. Irving.    Interval History:  3/18 AF, WBC 7.2, states her drains were removed yesterday, is willing to work with PT, pain stable and will return to the OR early next week, Bx - MSSA  3/19 AF, no CBC, plan to return to OR today for repeat I&D and liner exchange  3/20 Tm 99.7, WBC 8.1.  Pain is continuous and severe, 10/10 with movement and 6/10 at rest.  3/21- AF, no CBC.  Pain improved, down to 4/10 to L leg.  Would like to consider home infusion versus SNF.  Labs Reviewed, Medications Reviewed, Radiology Reviewed and Wound Reviewed.    Review of Systems:  Review of Systems   Constitutional: Negative for fever and chills.   Respiratory: Negative for cough and shortness of breath.    Cardiovascular: Negative for chest pain.   Gastrointestinal: Positive for constipation. Negative for nausea, vomiting, abdominal pain and diarrhea.        Last BM was last Monday   Genitourinary: Negative for dysuria.   Musculoskeletal: Positive for joint pain.        Improving   Skin: Negative for rash.   Neurological: Negative for headaches.       Hemodynamics:  Temp (24hrs), Av.7 °C (98 °F), Min:36.3 °C (97.3 °F), Max:37.4 °C (99.4 °F)  Temperature: 37.4 °C (99.4 °F)  Pulse  Av.3  Min: 53  Max: 97   Blood Pressure : 152/77 mmHg       Physical Exam:  Physical Exam   Constitutional: She is oriented to person, place, and time. She appears well-developed and well-nourished. No distress.   Obese   HENT:   Head: Normocephalic and atraumatic.   Eyes: EOM are normal. Pupils are equal, round, and reactive to light.   Neck: Normal range of motion. Neck supple.   Cardiovascular:  Normal rate, regular rhythm and normal heart sounds.    Pulmonary/Chest: Effort normal and breath sounds normal. No respiratory distress.   Abdominal: Soft. Bowel sounds are normal. She exhibits no distension. There is no tenderness.   Musculoskeletal: She exhibits tenderness.   LLE- rigid original surgical dressing in place with 3 JULIO CÉSAR drains, scant serosanguineous drainage.  Toes warm.  R knee surgical scar clean   Neurological: She is alert and oriented to person, place, and time.   Skin: Skin is warm and dry. No erythema.   Nursing note and vitals reviewed.      Meds:    Current facility-administered medications:   •  potassium chloride SA (Kdur) tablet 40 mEq, 40 mEq, Oral, Once, Luis Hdoges M.D.  •  PICC Line Insertion has been implemented, , , Once **AND** May use Lidocaine 1% not to exceed 3 mls for local at insertion site, , , CONTINUOUS **AND** NOTIFY MD, , , Once **AND** Tip to dwell in the superior vena cava, , , CONTINUOUS **AND** Do not use PICC Line until placement verified by Chest X Ray, , , CONTINUOUS **AND** DX-CHEST-FOR PICC LINE Perform procedure in:: PICC Room, , , Once **AND** If radiologist reading of chest X-ray states any of the following the PICC should be used, , , CONTINUOUS **AND** Further evaluation of the PICC placement can be retrieved from X-Ray and Imaging, , , CONTINUOUS **AND** Blood draws through PICC line; draws by RN only, , , CONTINUOUS **AND** FLUSHING GUIDELINES WHEN IN USE, , , CONTINUOUS **AND** normal saline PF 10-20 mL, 10-20 mL, Intravenous, PRN **AND** FLUSHING GUIDELINES WHEN NOT IN USE, , , CONTINUOUS **AND** DRESSING MAINTENANCE, , , Once **AND** Change needleless pressure ports and IV tubing every 72 hours per hospital policy, , , CONTINUOUS **AND** TUBING, , , CONTINUOUS **AND** If there is an MD order to remove the PICC line, any RN may remove the PICC line, , , CONTINUOUS **AND** [] PATIENT EDUCATION MATERIALS, , , Prior to discharge **AND** NURSING  COMMUNICATION, , , CONTINUOUS, MOHINDER Roche.  •  potassium chloride ER (KLOR-CON) tablet 30 mEq, 30 mEq, Oral, BID, David Sylvester M.D., 30 mEq at 03/21/17 0814  •  tobramycin (NEBCIN) injection 1.2 g, 1.2 g, Other, Intra-Op Once PRN, Rigo Irving M.D.  •  hydrALAZINE (APRESOLINE) injection 10 mg, 10 mg, Intravenous, Q6HRS PRN, David Sylvester M.D., 10 mg at 03/19/17 1615  •  insulin lispro (HUMALOG) injection 1-6 Units, 1-6 Units, Subcutaneous, 4X/DAY ACHS, David Sylvester M.D., Stopped at 03/20/17 1100  •  ceFAZolin (ANCEF) IVPB 2 g, 2 g, Intravenous, Q8HRS, Teagan Rosas M.D., 2 g at 03/21/17 0823  •  oxycodone immediate-release (ROXICODONE) tablet 5 mg, 5 mg, Oral, Q4HRS PRN, 5 mg at 03/20/17 2025 **OR** oxycodone immediate release (ROXICODONE) tablet 10 mg, 10 mg, Oral, Q4HRS PRN, David Sylvester M.D., 10 mg at 03/20/17 1254  •  Action is required: Protocol 1073 Hypoglycemia has been implemented, , , Once **AND** Protocol 1073 Inclusion Criteria, , , CONTINUOUS **AND** Protocol 1073 NOTIFY, , , Once **AND** Protocol 1073 Initiate protocol immediately if FSBG is less than or equal to 70 mg/dL, , , CONTINUOUS **AND** glucose 4 g chewable tablet 16 g, 16 g, Oral, Q15 MIN PRN **AND** dextrose 50% (D50W) injection 25 mL, 25 mL, Intravenous, Q15 MIN PRN, David Sylvester M.D.  •  enoxaparin (LOVENOX) inj 40 mg, 40 mg, Subcutaneous, DAILY, Rigo Irving M.D., 40 mg at 03/21/17 0814  •  losartan (COZAAR) tablet 50 mg, 50 mg, Oral, DAILY, Codi Singh M.D., 50 mg at 03/21/17 0814  •  omeprazole (PRILOSEC) capsule 20 mg, 20 mg, Oral, DAILY, Codi Singh M.D., 20 mg at 03/21/17 0823  •  oxybutynin (DITROPAN) tablet 10 mg, 10 mg, Oral, QHS, Codi Singh M.D., 10 mg at 03/20/17 2025  •  sertraline (ZOLOFT) tablet 100 mg, 100 mg, Oral, DAILY, Codi Singh M.D., 100 mg at 03/21/17 0814  •  trazodone (DESYREL) tablet 150 mg, 150 mg, Oral, QHS PRN, Codi Singh M.D., 150 mg  at 03/20/17 2026  •  tramadol (ULTRAM) 50 MG tablet 50 mg, 50 mg, Oral, Q6HRS PRN, Codi Singh M.D., 50 mg at 03/21/17 0830  •  senna-docusate (PERICOLACE or SENOKOT S) 8.6-50 MG per tablet 2 Tab, 2 Tab, Oral, BID, 2 Tab at 03/21/17 0814 **AND** polyethylene glycol/lytes (MIRALAX) PACKET 1 Packet, 1 Packet, Oral, QDAY PRN, 1 Packet at 03/21/17 0820 **AND** magnesium hydroxide (MILK OF MAGNESIA) suspension 30 mL, 30 mL, Oral, QDAY PRN **AND** bisacodyl (DULCOLAX) suppository 10 mg, 10 mg, Rectal, QDAY PRN, Codi Singh M.D.  •  ondansetron (ZOFRAN) syringe/vial injection 4 mg, 4 mg, Intravenous, Q4HRS PRN, Codi Singh M.D.  •  ondansetron (ZOFRAN ODT) dispertab 4 mg, 4 mg, Oral, Q4HRS PRN, Codi Singh M.D.  •  morphine (pf) 4 mg/ml injection 2-4 mg, 2-4 mg, Intravenous, Q2HRS PRN, Codi Singh M.D., 4 mg at 03/17/17 0849  •  NS infusion 2,586 mL, 30 mL/kg, Intravenous, Once PRN, Codi Singh M.D.  •  NS (BOLUS) infusion 500 mL, 500 mL, Intravenous, Once PRN, Codi Singh M.D., Stopped at 03/17/17 0846    Labs:  Recent Labs      03/20/17   0127   WBC  8.1   RBC  2.75*   HEMOGLOBIN  8.1*   HEMATOCRIT  23.4*   MCV  85.1   MCH  29.5   RDW  43.4   PLATELETCT  208   MPV  10.5   NEUTSPOLYS  80.50*   LYMPHOCYTES  7.40*   MONOCYTES  10.30   EOSINOPHILS  0.10   BASOPHILS  0.20     Recent Labs      03/20/17   0127   SODIUM  132*   POTASSIUM  3.0*   CHLORIDE  97   CO2  26   GLUCOSE  134*   BUN  8     Recent Labs      03/20/17   0127   CREATININE  0.81       Imaging:  Ct-extremity, Lower With Left    3/16/2017  3/16/2017 1:18 AM HISTORY/REASON FOR EXAM:  Atraumatic Pain/Swelling/Deformity. Left lateral thigh fluid collection Preoperative evaluation TECHNIQUE/EXAM DESCRIPTION AND NUMBER OF VIEWS:  CT scan of the LEFT lower extremity with contrast, with reconstructions. Thin helical 3 mm sections were obtained from the distal femur through the proximal tibia/fibula. Sagittal and coronal multiplanar  reconstructions were generated from the axial images. A total of 100 mL of Omnipaque 350 nonionic contrast was administered  IV without complication. Up to date radiation dose reduction adjustments have been utilized to meet ALARA standards for radiation dose reduction. COMPARISON: Left lower extremity ultrasound 3/15/2017 FINDINGS: There is a left hip arthroplasty present There is left femoral hardware present. There is a left knee arthroplasty. There is a fluid collection in the left lateral thigh. It is significantly obscured by artifact from the hardware. It is probably better assessed on the ultrasound 3/16/2017. There does however appear to be a mid/distal thigh fluid collection which by CT measures approximately 15 cm in length. Location is probably subcutaneous.     3/16/2017  1.  Limited assessment of left lateral thigh fluid collection due to artifact secondary to hardware 2.  Fluid collection is at least 15 cm in length and probably subcutaneous in location 3.  Left hip arthroplasty, left knee arthroplasty, left femoral hardware present    Us-liver And Biliary Tree    3/16/2017  3/16/2017 4:25 AM HISTORY/REASON FOR EXAM:  Abnormal liver function test Abdominal pain TECHNIQUE/EXAM DESCRIPTION AND NUMBER OF VIEWS:  Real-time sonography of the liver and biliary tree. COMPARISON: Abdominal ultrasound 6/16/2016 FINDINGS: The liver is normal in contour with increased echogenicity. There is no evidence of solid mass lesion. The liver measures 15.7 cm. The gallbladder is absent. The common duct measures 1.5 cm. The visualized pancreas is unremarkable. There is aortic atherosclerosis. Intrahepatic IVC is patent. The portal vein is patent with hepatopetal flow. The right kidney measures 8.1 cm. There is no ascites.     3/16/2017  1.  Prior cholecystectomy 2.  Moderate biliary dilation with common bile duct measuring 1.5 cm 3.  Fatty infiltration of the liver     Dx-portable Fluoroscopy < 1 Hour    3/16/2017   3/16/2017 6:13 PM HISTORY/REASON FOR EXAM:  Left hip irrigation and debridement. Left lateral thigh fluid collection. Left hip arthroplasty. TECHNIQUE/EXAM DESCRIPTION AND NUMBER OF VIEWS:  1 views of the LEFT hip. COMPARISON: CT scan 3/16/2017 FINDINGS: Single fluoroscopic image demonstrates a needle projecting over the left hip arthroplasty laterally. Fluoroscopy time of 6 seconds  was utilized by THIEN CORREA for this procedure.     3/16/2017  Intraoperative fluoroscopic spot images as described above.    Le Venous Duplex (specify In Comments Left, Right Or Bilateral)    3/16/2017   Vascular Laboratory  CONCLUSIONS  1.  Normal left lower extremity superficial and deep venous examination.  2.  In the left lateral thigh deep to the incision  There is a large complex fluid collection measuring 17.3 cm in length and  5.9 x 3.5 cm transversely. Differential includes postoperative seroma,  hematoma, or infected fluid.  ROGERS STRICKLAND  Exam Date:     03/15/2017 22:52  Room #:     Inpatient  Priority:     Stat  Ht (in):             Wt (lb):  Ordering Physician:        ANIBAL VAUGHN  Referring Physician:       [id number] Cifuentes  Sonographer:               Mayur Manuel RDMS, RVT  Study Type:                Complete Unilateral  Technical Quality:         Adequate  Age:    66    Gender:     F  MRN:    [id number]  :    1950      BSA:  Indications:     Pain in left leg  CPT Codes:       63820  ICD Codes:       [id number]  History:         Pain and swelling of the left thigh  Limitations:  PROCEDURES:  Left lower extremity venous duplex imaging.  The following venous structures were evaluated: common femoral, profunda  femoral, greater saphenous, femoral, popliteal , peroneal and posterior  tibial veins.  Serial compression, augmentation maneuvers,  color and spectral Doppler  flow evaluations were performed.  FINDINGS:  Left lower extremity -.  Complete color filling and  compressibility with normal venous flow dynamics  including spontaneous flow, response to augmentation maneuvers, and  respiratory phasicity.  No superficial or deep venous thrombosis.  The peroneal and posterior tibial veins are difficult to assess for  compressibility, but flow response to augmentation is demonstrated.  Lateral left thigh at the area of concern: 17.3x5.9x3.5cm complex fluid  collection. This area is at a previous incision site.  Flow was evaluated in the contralateral common femoral vein and normal  venous flow dynamics including spontaneous flow, respiratory phasic  variation and augmentation were demonstrated.  Zander Cruz  (Electronically Signed)  Final Date:      16 March 2017                   00:09    Dx-hip-unilateral-without Pelvis-1 View Left    3/16/2017  3/16/2017 6:13 PM HISTORY/REASON FOR EXAM:  Left hip irrigation and debridement. Left lateral thigh fluid collection. Left hip arthroplasty. TECHNIQUE/EXAM DESCRIPTION AND NUMBER OF VIEWS:  1 views of the LEFT hip. COMPARISON: CT scan 3/16/2017 FINDINGS: Single fluoroscopic image demonstrates a needle projecting over the left hip arthroplasty laterally. Fluoroscopy time of 6 seconds  was utilized by THIEN CORREA for this procedure.     3/16/2017  Intraoperative fluoroscopic spot images as described above.      Micro:  BLOOD CULTURE   Date Value Ref Range Status   03/17/2017   Preliminary    No Growth    Note: Blood cultures are incubated for 5 days and  are monitored continuously.Positive blood cultures  are called to the RN and reported as soon as  they are identified.          Assessment:  Active Hospital Problems    Diagnosis   • *Fluid collection at surgical site [T88.8XXA]   • Staphylococcus aureus bacteremia [R78.81]   • Fatty liver disease, nonalcoholic [K76.0]   • Dental caries [K02.9]   • Common bile duct dilatation [K83.8]   • Elevated LFTs [R79.89]   • Depression [F32.9]   • Vitamin D deficiency [E55.9]   • Diabetes  mellitus type II, controlled (CMS-HCC) [E11.9]   • HTN (hypertension) [I10]   • HLD (hyperlipidemia) [E78.5]   • GERD (gastroesophageal reflux disease) [K21.9]   • Urinary incontinence with continuous leakage [N39.45]       Plan:  Left femur hardware infection with abscess  S/p removal 03/16/17  Fluid collection seen on CT  , CRP 22  OR gram stain +GPC, Cx - MSSA  D/c'd zosyn and vanco 3/18  Continue IV Cefazolin. Anticipate 6 week course from 3/16/17, end date 4/27/17.  S/p repeat I&D with liner exchange on 3/19 with abx bead placement  OR cx 3/19- staph aureus    Left septic knee arthritis  S/p arthrotomy and drainage of knee with liner exchange 3/16  S/p repeat I&D as above.  Abx per above    MSSA bacteremia  3/16 Bcx - positive   3/17 Bcx - NGTD  Abx per above  YOMI if feasible to definitively r/o infective endocarditis    DM2  Optimize BS control to assist with wound healing, DM can worsen infection.    SW looking into [facility].  Pt would like to consider home infusion.  Will attempt to get a quote from [facility] for cost.

## 2017-03-21 NOTE — CARE PLAN
Problem: Bowel/Gastric:  Goal: Normal bowel function is maintained or improved  PRN Stool softeners given encouraged diet high fiber diet, increased fluids and ambulation. Pt compliant with education.    03/21/17 1225   OTHER   Last BM 03/16/17        Problem: Pain Management  Goal: Pain level will decrease to patient’s comfort goal  PRN pain medication given for 6/10, SCD' in place, encouraged elevation and rest.

## 2017-03-21 NOTE — PROGRESS NOTES
Hospital Medicine Progress Note, Adult, Complex               Author: Luis Hodges Date & Time created: 3/21/2017  8:28 AM     Interval History:  65 y/o female LLE fluid colleciton, s/p arthrotomy, hardware removal and left knee hardware removal with liner exchange.     Pain controlled with meds. Says she is not using for at least the last 2 weeks. Very diaphoretic with friend sitting at bedside when i arrived to see her.     Review of Systems:  Review of Systems   Constitutional: Negative for fever and chills.   HENT: Negative for sore throat.    Eyes: Negative for blurred vision and pain.   Respiratory: Negative for shortness of breath.    Cardiovascular: Negative for palpitations.   Gastrointestinal: Negative for nausea and abdominal pain.   Genitourinary: Negative for dysuria and urgency.   Musculoskeletal: Negative for back pain and neck pain.   Skin: Negative for itching and rash.   Neurological: Negative for dizziness, tingling and headaches.   Psychiatric/Behavioral: Negative for depression. The patient does not have insomnia.    All other systems reviewed and are negative.      Physical Exam:  Physical Exam   Constitutional: She is oriented to person, place, and time. She appears well-developed and well-nourished. No distress.   HENT:   Right Ear: External ear normal.   Left Ear: External ear normal.   Nose: Nose normal.   Mouth/Throat: No oropharyngeal exudate.   Eyes: Right eye exhibits no discharge. Left eye exhibits no discharge. No scleral icterus.   Neck: Normal range of motion. Neck supple. No JVD present.   Cardiovascular: Normal rate, regular rhythm, normal heart sounds and intact distal pulses.    No murmur heard.  Pulmonary/Chest: Effort normal and breath sounds normal. No stridor. No respiratory distress. She has no rales.   Abdominal: Soft. Bowel sounds are normal. She exhibits no distension. There is no tenderness.   Musculoskeletal: Normal range of motion. She exhibits edema and  tenderness.   Multiple surgical scars, bandages in place   Neurological: She is alert and oriented to person, place, and time. No cranial nerve deficit.   Skin: Skin is warm. No rash noted. She is diaphoretic. No erythema.   Psychiatric: She has a normal mood and affect. Her behavior is normal.   Nursing note and vitals reviewed.      Labs:        Invalid input(s): [id number]      Recent Labs      17   012   SODIUM  132*   POTASSIUM  3.0*   CHLORIDE  97   CO2  26   BUN  8   CREATININE  0.81   MAGNESIUM  1.3*   CALCIUM  9.4     Recent Labs      17   012   GLUCOSE  134*     Recent Labs      17   012   RBC  2.75*   HEMOGLOBIN  8.1*   HEMATOCRIT  23.4*   PLATELETCT  208     Recent Labs      17   WBC  8.1   NEUTSPOLYS  80.50*   LYMPHOCYTES  7.40*   MONOCYTES  10.30   EOSINOPHILS  0.10   BASOPHILS  0.20           Hemodynamics:  Temp (24hrs), Av.5 °C (97.7 °F), Min:36.3 °C (97.3 °F), Max:36.6 °C (97.9 °F)  Temperature: 36.3 °C (97.3 °F)  Pulse  Av.6  Min: 53  Max: 97   Blood Pressure : 152/77 mmHg     Respiratory:    Respiration: 18, Pulse Oximetry: 91 %           Fluids:    Intake/Output Summary (Last 24 hours) at 17 0828  Last data filed at 17 0500   Gross per 24 hour   Intake    120 ml   Output     77 ml   Net     43 ml        GI/Nutrition:  Orders Placed This Encounter   Procedures   • DIET ORDER     Standing Status: Standing      Number of Occurrences: 1      Standing Expiration Date:      Order Specific Question:  Diet:     Answer:  Low Fiber(GI Soft) [2]     Order Specific Question:  Diet:     Answer:  Diabetic [3]     Medical Decision Making, by Problem:  Active Hospital Problems    Diagnosis   • *Fluid collection at surgical site [T88.8XXA]-c/f infection, doing well  -s/p hardware removal and arthrotomy and drainage of the left knee with liner exchange and I&D POD#4  -repeat I&D today with liner exchange  -Start IV ancef given most likely bacteria is MSSA,  "likely 6 week course, cant do PICC line for at least 24 more hours, F/U final blood cultures, likely tomorrow  -F/U new intra-op cultures collected. No organisms so far.  -continue multi-modal pain therapy including IV narcotics PRN   • Fatty liver disease, nonalcoholic [K76.0]-encourage weight loss   • Dental caries [K02.9]-recent teeth extraction, monitor   • Common bile duct dilatation [K83.8]-unclear significance, no belly pain, LFT\"s down-trending, monitor, continues to improve today   • Elevated LFTs [R79.89]-2/2 ishcemia, versus toxicity, APAP levels are ok, down-trending continues   • Depression [F32.9]-outpatient treatment, zoloft   • Vitamin D deficiency [E55.9]-replacement   • Diabetes mellitus type II, controlled (CMS-HCC) [E11.9]-monitor surgars closely. Doing well currently again  -ISS, adjust PRN   • HTN (hypertension) [I10]-well controlled, continue current meds   • HLD (hyperlipidemia) [E78.5]-statin   • GERD (gastroesophageal reflux disease) [K21.9]-PPI   • Urinary incontinence with continuous leakage [N39.45]-ditropan     Staph bacteremia-all 4/4, likely from LLE  -confirmed MSSA  -continue ancef as described above  -ECHO with no e/o valvular vegetations    Hypokalemia-replace Oral today  -check in the AM    Anemia- fe studies  K replaced    Labs reviewed, Medications reviewed and EKG reviewed  Shannon catheter: No Shannon        DVT prophylaxis - mechanical: SCDs      Assessed for rehab: Patient was assess for and/or received rehabilitation services during this hospitalization        "

## 2017-03-21 NOTE — PROGRESS NOTES
Received shift report from amada RN and assumed care of this pt at 0715. Pt AOx4 . Pt reports pain is 5/10 to LLE -Pt is up OOB for meals, . Pt appropriately with call light when needing assistance. Bed alarm declined. PIV assessed and is patent, CDI, running IVF. Pt is on RA.  Discussed POC for day shift,  comfort, and safety. Patient has call light and personal belongings within reach. Safety and fall precautions in place. Reviewed orders, notes, labs, and test results. Hourly rounding in place with RN rounding on odd hours and CNA on even hours.

## 2017-03-21 NOTE — PROGRESS NOTES
No new c/o  AVSS  Hct 23  WBC nl  Cultures - MSSA  JULIO CÉSAR - 65/10/2  + DF/PF    Plan:    D/c deep drains  ABX  ASA, foot pumps  WBAT in splint  Superficial drain out tomorrow  D/c planning

## 2017-03-22 LAB
ALBUMIN SERPL BCP-MCNC: 2.9 G/DL (ref 3.2–4.9)
ALBUMIN/GLOB SERPL: 1 G/DL
ALP SERPL-CCNC: 82 U/L (ref 30–99)
ALT SERPL-CCNC: 5 U/L (ref 2–50)
ANION GAP SERPL CALC-SCNC: 9 MMOL/L (ref 0–11.9)
AST SERPL-CCNC: 15 U/L (ref 12–45)
BACTERIA BLD CULT: NORMAL
BACTERIA BLD CULT: NORMAL
BACTERIA SPEC ANAEROBE CULT: NORMAL
BACTERIA TISS AEROBE CULT: ABNORMAL
BACTERIA TISS AEROBE CULT: ABNORMAL
BASOPHILS # BLD AUTO: 0.2 % (ref 0–1.8)
BASOPHILS # BLD: 0.01 K/UL (ref 0–0.12)
BILIRUB SERPL-MCNC: 0.6 MG/DL (ref 0.1–1.5)
BUN SERPL-MCNC: 7 MG/DL (ref 8–22)
CALCIUM SERPL-MCNC: 9.6 MG/DL (ref 8.5–10.5)
CHLORIDE SERPL-SCNC: 99 MMOL/L (ref 96–112)
CO2 SERPL-SCNC: 29 MMOL/L (ref 20–33)
COMMENT 1642: NORMAL
CREAT SERPL-MCNC: 0.82 MG/DL (ref 0.5–1.4)
EOSINOPHIL # BLD AUTO: 0.21 K/UL (ref 0–0.51)
EOSINOPHIL NFR BLD: 3.6 % (ref 0–6.9)
ERYTHROCYTE [DISTWIDTH] IN BLOOD BY AUTOMATED COUNT: 46.5 FL (ref 35.9–50)
GFR SERPL CREATININE-BSD FRML MDRD: >60 ML/MIN/1.73 M 2
GLOBULIN SER CALC-MCNC: 3 G/DL (ref 1.9–3.5)
GLUCOSE BLD-MCNC: 114 MG/DL (ref 65–99)
GLUCOSE BLD-MCNC: 127 MG/DL (ref 65–99)
GLUCOSE BLD-MCNC: 143 MG/DL (ref 65–99)
GLUCOSE SERPL-MCNC: 137 MG/DL (ref 65–99)
GRAM STN SPEC: ABNORMAL
HCT VFR BLD AUTO: 25.3 % (ref 37–47)
HGB BLD-MCNC: 8.2 G/DL (ref 12–16)
IMM GRANULOCYTES # BLD AUTO: 0.31 K/UL (ref 0–0.11)
IMM GRANULOCYTES NFR BLD AUTO: 5.3 % (ref 0–0.9)
LYMPHOCYTES # BLD AUTO: 0.98 K/UL (ref 1–4.8)
LYMPHOCYTES NFR BLD: 16.7 % (ref 22–41)
MAGNESIUM SERPL-MCNC: 1.3 MG/DL (ref 1.5–2.5)
MCH RBC QN AUTO: 28.6 PG (ref 27–33)
MCHC RBC AUTO-ENTMCNC: 32.4 G/DL (ref 33.6–35)
MCV RBC AUTO: 88.2 FL (ref 81.4–97.8)
MONOCYTES # BLD AUTO: 0.57 K/UL (ref 0–0.85)
MONOCYTES NFR BLD AUTO: 9.7 % (ref 0–13.4)
MORPHOLOGY BLD-IMP: NORMAL
NEUTROPHILS # BLD AUTO: 3.78 K/UL (ref 2–7.15)
NEUTROPHILS NFR BLD: 64.5 % (ref 44–72)
NRBC # BLD AUTO: 0.06 K/UL
NRBC BLD AUTO-RTO: 1 /100 WBC
PLATELET # BLD AUTO: 235 K/UL (ref 164–446)
PLATELET BLD QL SMEAR: NORMAL
PMV BLD AUTO: 10.3 FL (ref 9–12.9)
POTASSIUM SERPL-SCNC: 3.9 MMOL/L (ref 3.6–5.5)
PROT SERPL-MCNC: 5.9 G/DL (ref 6–8.2)
RBC # BLD AUTO: 2.87 M/UL (ref 4.2–5.4)
RBC BLD AUTO: NORMAL
SIGNIFICANT IND 70042: ABNORMAL
SIGNIFICANT IND 70042: NORMAL
SITE SITE: ABNORMAL
SITE SITE: NORMAL
SODIUM SERPL-SCNC: 137 MMOL/L (ref 135–145)
SOURCE SOURCE: ABNORMAL
SOURCE SOURCE: NORMAL
WBC # BLD AUTO: 5.9 K/UL (ref 4.8–10.8)

## 2017-03-22 PROCEDURE — 82962 GLUCOSE BLOOD TEST: CPT

## 2017-03-22 PROCEDURE — 700111 HCHG RX REV CODE 636 W/ 250 OVERRIDE (IP): Performed by: HOSPITALIST

## 2017-03-22 PROCEDURE — 85025 COMPLETE CBC W/AUTO DIFF WBC: CPT

## 2017-03-22 PROCEDURE — 770006 HCHG ROOM/CARE - MED/SURG/GYN SEMI*

## 2017-03-22 PROCEDURE — 700111 HCHG RX REV CODE 636 W/ 250 OVERRIDE (IP): Performed by: ORTHOPAEDIC SURGERY

## 2017-03-22 PROCEDURE — 80053 COMPREHEN METABOLIC PANEL: CPT

## 2017-03-22 PROCEDURE — 700111 HCHG RX REV CODE 636 W/ 250 OVERRIDE (IP): Performed by: INTERNAL MEDICINE

## 2017-03-22 PROCEDURE — A9270 NON-COVERED ITEM OR SERVICE: HCPCS | Performed by: INTERNAL MEDICINE

## 2017-03-22 PROCEDURE — 700102 HCHG RX REV CODE 250 W/ 637 OVERRIDE(OP): Performed by: INTERNAL MEDICINE

## 2017-03-22 PROCEDURE — 99233 SBSQ HOSP IP/OBS HIGH 50: CPT | Performed by: HOSPITALIST

## 2017-03-22 PROCEDURE — 83735 ASSAY OF MAGNESIUM: CPT

## 2017-03-22 RX ORDER — MAGNESIUM SULFATE HEPTAHYDRATE 40 MG/ML
4 INJECTION, SOLUTION INTRAVENOUS ONCE
Status: COMPLETED | OUTPATIENT
Start: 2017-03-22 | End: 2017-03-22

## 2017-03-22 RX ADMIN — LOSARTAN POTASSIUM 50 MG: 50 TABLET, FILM COATED ORAL at 08:14

## 2017-03-22 RX ADMIN — CEFAZOLIN SODIUM 2 G: 2 INJECTION, SOLUTION INTRAVENOUS at 20:41

## 2017-03-22 RX ADMIN — OXYBUTYNIN CHLORIDE 10 MG: 5 TABLET ORAL at 20:40

## 2017-03-22 RX ADMIN — CEFAZOLIN SODIUM 2 G: 2 INJECTION, SOLUTION INTRAVENOUS at 15:26

## 2017-03-22 RX ADMIN — SERTRALINE 100 MG: 100 TABLET, FILM COATED ORAL at 08:14

## 2017-03-22 RX ADMIN — OXYCODONE HYDROCHLORIDE 5 MG: 5 TABLET ORAL at 03:32

## 2017-03-22 RX ADMIN — OMEPRAZOLE 20 MG: 20 CAPSULE, DELAYED RELEASE ORAL at 08:14

## 2017-03-22 RX ADMIN — ENOXAPARIN SODIUM 40 MG: 100 INJECTION SUBCUTANEOUS at 08:15

## 2017-03-22 RX ADMIN — POTASSIUM CHLORIDE 30 MEQ: 750 TABLET, FILM COATED, EXTENDED RELEASE ORAL at 08:14

## 2017-03-22 RX ADMIN — OXYCODONE HYDROCHLORIDE 5 MG: 5 TABLET ORAL at 09:03

## 2017-03-22 RX ADMIN — Medication 2 TABLET: at 08:15

## 2017-03-22 RX ADMIN — MAGNESIUM SULFATE IN WATER 4 G: 40 INJECTION, SOLUTION INTRAVENOUS at 12:07

## 2017-03-22 RX ADMIN — POTASSIUM CHLORIDE 30 MEQ: 750 TABLET, FILM COATED, EXTENDED RELEASE ORAL at 20:40

## 2017-03-22 RX ADMIN — INSULIN LISPRO 1 UNITS: 100 INJECTION, SOLUTION INTRAVENOUS; SUBCUTANEOUS at 20:54

## 2017-03-22 RX ADMIN — Medication 2 TABLET: at 20:40

## 2017-03-22 RX ADMIN — CEFAZOLIN SODIUM 2 G: 2 INJECTION, SOLUTION INTRAVENOUS at 06:19

## 2017-03-22 RX ADMIN — TRAZODONE HYDROCHLORIDE 150 MG: 150 TABLET ORAL at 20:42

## 2017-03-22 RX ADMIN — POLYETHYLENE GLYCOL 3350 1 PACKET: 17 POWDER, FOR SOLUTION ORAL at 20:40

## 2017-03-22 RX ADMIN — TRAMADOL HYDROCHLORIDE 50 MG: 50 TABLET, COATED ORAL at 23:56

## 2017-03-22 ASSESSMENT — ENCOUNTER SYMPTOMS
FEVER: 0
NECK PAIN: 0
BLURRED VISION: 0
SORE THROAT: 0
DEPRESSION: 0
VOMITING: 0
TINGLING: 0
DIZZINESS: 0
BACK PAIN: 0
INSOMNIA: 0
SHORTNESS OF BREATH: 0
CHILLS: 0
CONSTIPATION: 1
EYE PAIN: 0
COUGH: 0
DIARRHEA: 0
NAUSEA: 0
ABDOMINAL PAIN: 0
PALPITATIONS: 0
HEADACHES: 0

## 2017-03-22 ASSESSMENT — PAIN SCALES - GENERAL
PAINLEVEL_OUTOF10: 4
PAINLEVEL_OUTOF10: 4
PAINLEVEL_OUTOF10: 2
PAINLEVEL_OUTOF10: 7

## 2017-03-22 NOTE — PROGRESS NOTES
Second drain out  No new c/o  Splint intact  + DF/PF  AVSS  Hct 25, WBC nl    Plan:    ABX  Mobilize  DVT proph with foot pumps, ASA  D/c planning  F/u LAURENT 3/27

## 2017-03-22 NOTE — CONSULTS
"Diabetes education: Met with Shashank, who has hx of diabetes, previously on Metformin, but currently not on medications nor testing her blood sugars. Pt states she knows \"alot about diabetes\". Discussed briefly what diabetes was and what an A1c was ( she did not know and does not currently have an A1c listed).  Pt is on sliding scale Humalog with blood sugars of 114, 127,128 and highest of 144.   Plan : CDE will follow up with her tomorrow. Pt needs to have an Hg A1c done as none has been done . Please call [phone] if needs change.  "

## 2017-03-22 NOTE — PROGRESS NOTES
Pt awake and alert, laying in bed comfortably, dressing to left knee and leg intact, no drainage noted, able to verblize needs to staff, hourly rounds in place, call light and fluids placed within reach.

## 2017-03-22 NOTE — CARE PLAN
Problem: Safety  Goal: Will remain free from injury  Outcome: PROGRESSING AS EXPECTED  Pt calls for assistance when needed to get out of bed, able to ambulate with walker, steady gait, standby assist needed, hourly rounds in place.

## 2017-03-22 NOTE — PROGRESS NOTES
Diabetes education: Pt seen this afternoon. Please see consult note.  Plan : CDE will follow up with her tomorrow. Pt needs to have an Hg A1c done as none has been done . Please call [phone] if needs change.

## 2017-03-22 NOTE — PROGRESS NOTES
Hospital Medicine Progress Note, Adult, Complex               Author: Luis Hodges Date & Time created: 3/22/2017  9:05 AM     Interval History:  65 y/o female LLE fluid colleciton, s/p arthrotomy, hardware removal and left knee hardware removal with liner exchange. Cx positive for MSSA.     Getting up to BR with walker. Stand by assist now. No other events over night. YOMI pending for tomorrow. Minimal output from drain      Review of Systems:  Review of Systems   Constitutional: Negative for fever and chills.   HENT: Negative for sore throat.    Eyes: Negative for blurred vision and pain.   Respiratory: Negative for shortness of breath.    Cardiovascular: Negative for palpitations.   Gastrointestinal: Negative for nausea and abdominal pain.   Genitourinary: Negative for dysuria and urgency.   Musculoskeletal: Negative for back pain and neck pain.   Skin: Negative for itching and rash.   Neurological: Negative for dizziness, tingling and headaches.   Psychiatric/Behavioral: Negative for depression. The patient does not have insomnia.    All other systems reviewed and are negative.      Physical Exam:  Physical Exam   Constitutional: She is oriented to person, place, and time. She appears well-developed and well-nourished. No distress.   HENT:   Right Ear: External ear normal.   Left Ear: External ear normal.   Nose: Nose normal.   Mouth/Throat: No oropharyngeal exudate.   Eyes: Right eye exhibits no discharge. Left eye exhibits no discharge. No scleral icterus.   Neck: Normal range of motion. Neck supple. No JVD present.   Cardiovascular: Normal rate, regular rhythm, normal heart sounds and intact distal pulses.    No murmur heard.  Pulmonary/Chest: Effort normal and breath sounds normal. No stridor. No respiratory distress. She has no rales.   Abdominal: Soft. Bowel sounds are normal. She exhibits no distension. There is no tenderness.   Musculoskeletal: Normal range of motion. She exhibits edema and tenderness.    Multiple surgical scars, bandages in place   Neurological: She is alert and oriented to person, place, and time. No cranial nerve deficit.   Skin: Skin is warm. No rash noted. She is diaphoretic. No erythema.   Psychiatric: She has a normal mood and affect. Her behavior is normal.   Nursing note and vitals reviewed.      Labs:        Invalid input(s): [id number]      Recent Labs      17   0330   SODIUM  132*  133*  137   POTASSIUM  3.0*  4.0  3.9   CHLORIDE  97  96  99   CO2  26  27  29   BUN  8  9  7*   CREATININE  0.81  0.88  0.82   MAGNESIUM  1.3*  1.2*  1.3*   CALCIUM  9.4  9.9  9.6     Recent Labs      17   17217   0330   ALTSGPT   --    --   5   ASTSGOT   --    --   15   ALKPHOSPHAT   --    --   82   TBILIRUBIN   --    --   0.6   GLUCOSE  134*  153*  137*     Recent Labs      17   0330   RBC  2.75*  2.87*   HEMOGLOBIN  8.1*  8.2*   HEMATOCRIT  23.4*  25.3*   PLATELETCT  208  235     Recent Labs      17   0330   WBC  8.1  5.9   NEUTSPOLYS  80.50*  64.50   LYMPHOCYTES  7.40*  16.70*   MONOCYTES  10.30  9.70   EOSINOPHILS  0.10  3.60   BASOPHILS  0.20  0.20   ASTSGOT   --   15   ALTSGPT   --   5   ALKPHOSPHAT   --   82   TBILIRUBIN   --   0.6           Hemodynamics:  Temp (24hrs), Av.4 °C (99.4 °F), Min:36.9 °C (98.5 °F), Max:38 °C (100.4 °F)  Temperature: 37.5 °C (99.5 °F)  Pulse  Av.2  Min: 53  Max: 97   Blood Pressure : 139/77 mmHg     Respiratory:    Respiration: 16, Pulse Oximetry: 91 %           Fluids:    Intake/Output Summary (Last 24 hours) at 17 0905  Last data filed at 17 0400   Gross per 24 hour   Intake    300 ml   Output     50 ml   Net    250 ml        GI/Nutrition:  Orders Placed This Encounter   Procedures   • DIET NPO     Standing Status: Standing      Number of Occurrences: 1      Standing Expiration Date:      Order Specific Question:  Restrict to:      Answer:  Sips with Medications [3]     Medical Decision Making, by Problem:  Active Hospital Problems    Diagnosis   • *Fluid collection at surgical site [T88.8XXA]-c/f infection, doing well  -s/p hardware removal and arthrotomy and drainage of the left knee with liner exchange and I&D  -mssa in cx. abx per ID ongoing. ZOYA pending.    • Fatty liver disease, nonalcoholic [K76.0]   • Dental caries [K02.9]-recent teeth extraction, zoya pending   • Common bile duct dilatation [K83.8]-consistent with previous cholecystectomy.    • Elevated LFTs [R79.89]-resolved. Normal workup.    • Depression [F32.9]-outpatient treatment, zoloft   • Vitamin D deficiency [E55.9]-replacement   • Diabetes mellitus type II, controlled (CMS-HCC) [E11.9]-ISS, adjust PRN   • HTN (hypertension) [I10]-well controlled, continue current meds   • HLD (hyperlipidemia) [E78.5]-statin   • GERD (gastroesophageal reflux disease) [K21.9]-PPI   • Urinary incontinence with continuous leakage [N39.45]-ditropan     Staph bacteremia-confirmed MSSA  -abx per ID, recent dental work as above. ZOYA pending.     Hypokalemia-replace      Anemia- fe studies pending  Mag again 4g and trend  Check b12    Discussed plan with RN    Labs reviewed, Medications reviewed and EKG reviewed  Shannon catheter: No Shannon        DVT prophylaxis - mechanical: SCDs      Assessed for rehab: Patient was assess for and/or received rehabilitation services during this hospitalization

## 2017-03-22 NOTE — PROGRESS NOTES
Infectious Disease Progress Note    Author: JIMMIE Roche DOS & Time created: 3/22/2017  10:48 AM    Chief Complaint:  Chief Complaint   Patient presents with   • Leg Pain     L   FU for left femoral hardware infection and left septic knee arthritis.  S/p I&D to L knee and thigh, with placement of abx beads to L knee and thigh on 3/19 with Dr. Irving.    Interval History:  3/18 AF, WBC 7.2, states her drains were removed yesterday, is willing to work with PT, pain stable and will return to the OR early next week, Bx - MSSA  3/19 AF, no CBC, plan to return to OR today for repeat I&D and liner exchange  3/20 Tm 99.7, WBC 8.1.  Pain is continuous and severe, 10/10 with movement and 6/10 at rest.  3/21- AF, no CBC.  Pain improved, down to 4/10 to L leg.  Would like to consider home infusion versus SNF.  3/22- Tm 100.4, WBC 5.9.  Left leg pain remains around 4/10.  Now ok going to a SNF, as long as it is in [place].  Tolerating abx without SE.  Labs Reviewed, Medications Reviewed, Radiology Reviewed and Wound Reviewed.    Review of Systems:  Review of Systems   Constitutional: Negative for fever and chills.   Respiratory: Negative for cough and shortness of breath.    Cardiovascular: Negative for chest pain.   Gastrointestinal: Positive for constipation. Negative for nausea, vomiting, abdominal pain and diarrhea.        Last BM on 3/13   Genitourinary: Negative for dysuria.   Musculoskeletal: Positive for joint pain.        Improving   Skin: Negative for rash.   Neurological: Negative for headaches.       Hemodynamics:  Temp (24hrs), Av.4 °C (99.4 °F), Min:36.9 °C (98.5 °F), Max:38 °C (100.4 °F)  Temperature: 37.5 °C (99.5 °F)  Pulse  Av.2  Min: 53  Max: 97   Blood Pressure : 139/77 mmHg       Physical Exam:  Physical Exam   Constitutional: She is oriented to person, place, and time. She appears well-developed and well-nourished. No distress.   Obese   HENT:   Head: Normocephalic and  atraumatic.   Eyes: EOM are normal. Pupils are equal, round, and reactive to light.   Neck: Normal range of motion. Neck supple.   Cardiovascular: Normal rate, regular rhythm and normal heart sounds.    Pulmonary/Chest: Effort normal and breath sounds normal. No respiratory distress.   Abdominal: Soft. Bowel sounds are normal. She exhibits no distension. There is no tenderness.   Musculoskeletal: She exhibits tenderness.   LLE- rigid original surgical dressing in place with 3 JULIO CÉSAR drains, scant serosanguineous drainage.  Toes warm.  R knee surgical scar clean  RUE PICC- non tender, no erythema.   Neurological: She is alert and oriented to person, place, and time.   Skin: Skin is warm and dry. No erythema.   Nursing note and vitals reviewed.      Meds:    Current facility-administered medications:   •  magnesium sulfate IVPB premix 4 g, 4 g, Intravenous, Once, Luis Hodges M.D.  •  PICC Line Insertion has been implemented, , , Once **AND** May use Lidocaine 1% not to exceed 3 mls for local at insertion site, , , CONTINUOUS **AND** NOTIFY MD, , , Once **AND** Tip to dwell in the superior vena cava, , , CONTINUOUS **AND** Do not use PICC Line until placement verified by Chest X Ray, , , CONTINUOUS **AND** DX-CHEST-FOR PICC LINE Perform procedure in:: PICC Room, , , Once **AND** If radiologist reading of chest X-ray states any of the following the PICC should be used, , , CONTINUOUS **AND** Further evaluation of the PICC placement can be retrieved from X-Ray and Imaging, , , CONTINUOUS **AND** Blood draws through PICC line; draws by RN only, , , CONTINUOUS **AND** FLUSHING GUIDELINES WHEN IN USE, , , CONTINUOUS **AND** normal saline PF 10-20 mL, 10-20 mL, Intravenous, PRN **AND** FLUSHING GUIDELINES WHEN NOT IN USE, , , CONTINUOUS **AND** DRESSING MAINTENANCE, , , Once **AND** Change needleless pressure ports and IV tubing every 72 hours per hospital policy, , , CONTINUOUS **AND** TUBING, , , CONTINUOUS **AND** If  there is an MD order to remove the PICC line, any RN may remove the PICC line, , , CONTINUOUS **AND** [] PATIENT EDUCATION MATERIALS, , , Prior to discharge **AND** NURSING COMMUNICATION, , , CONTINUOUS, MOHINDER Roche.  •  potassium chloride ER (KLOR-CON) tablet 30 mEq, 30 mEq, Oral, BID, David Sylvester M.D., 30 mEq at 17 0814  •  tobramycin (NEBCIN) injection 1.2 g, 1.2 g, Other, Intra-Op Once PRN, Rigo Irving M.D.  •  hydrALAZINE (APRESOLINE) injection 10 mg, 10 mg, Intravenous, Q6HRS PRN, David Sylvester M.D., 10 mg at 17 1615  •  insulin lispro (HUMALOG) injection 1-6 Units, 1-6 Units, Subcutaneous, 4X/DAY ACHS, David Sylvester M.D., Stopped at 17 1100  •  ceFAZolin (ANCEF) IVPB 2 g, 2 g, Intravenous, Q8HRS, Bre Oconnor M.D., 2 g at 17 0619  •  oxycodone immediate-release (ROXICODONE) tablet 5 mg, 5 mg, Oral, Q4HRS PRN, 5 mg at 17 0903 **OR** oxycodone immediate release (ROXICODONE) tablet 10 mg, 10 mg, Oral, Q4HRS PRN, David Sylvester M.D., 10 mg at 17 2124  •  Action is required: Protocol 1073 Hypoglycemia has been implemented, , , Once **AND** Protocol  Inclusion Criteria, , , CONTINUOUS **AND** Protocol 1073 NOTIFY, , , Once **AND** Protocol 1073 Initiate protocol immediately if FSBG is less than or equal to 70 mg/dL, , , CONTINUOUS **AND** glucose 4 g chewable tablet 16 g, 16 g, Oral, Q15 MIN PRN **AND** dextrose 50% (D50W) injection 25 mL, 25 mL, Intravenous, Q15 MIN PRN, David Sylvester M.D.  •  enoxaparin (LOVENOX) inj 40 mg, 40 mg, Subcutaneous, DAILY, Rigo Irving M.D., 40 mg at 1715  •  losartan (COZAAR) tablet 50 mg, 50 mg, Oral, DAILY, Codi Singh M.D., 50 mg at 17  •  omeprazole (PRILOSEC) capsule 20 mg, 20 mg, Oral, DAILY, Codi Singh M.D., 20 mg at 17  •  oxybutynin (DITROPAN) tablet 10 mg, 10 mg, Oral, QHS, Codi Singh M.D., 10 mg at 17  •  sertraline  (ZOLOFT) tablet 100 mg, 100 mg, Oral, DAILY, Codi Singh M.D., 100 mg at 03/22/17 0814  •  trazodone (DESYREL) tablet 150 mg, 150 mg, Oral, QHS PRN, Codi Singh M.D., 150 mg at 03/21/17 2124  •  tramadol (ULTRAM) 50 MG tablet 50 mg, 50 mg, Oral, Q6HRS PRN, Codi Singh M.D., 50 mg at 03/21/17 0830  •  senna-docusate (PERICOLACE or SENOKOT S) 8.6-50 MG per tablet 2 Tab, 2 Tab, Oral, BID, 2 Tab at 03/22/17 0815 **AND** polyethylene glycol/lytes (MIRALAX) PACKET 1 Packet, 1 Packet, Oral, QDAY PRN, 1 Packet at 03/21/17 0820 **AND** magnesium hydroxide (MILK OF MAGNESIA) suspension 30 mL, 30 mL, Oral, QDAY PRN, 30 mL at 03/21/17 2133 **AND** bisacodyl (DULCOLAX) suppository 10 mg, 10 mg, Rectal, QDAY PRN, Codi Singh M.D.  •  ondansetron (ZOFRAN) syringe/vial injection 4 mg, 4 mg, Intravenous, Q4HRS PRN, Codi Singh M.D.  •  ondansetron (ZOFRAN ODT) dispertab 4 mg, 4 mg, Oral, Q4HRS PRN, Codi Singh M.D.  •  morphine (pf) 4 mg/ml injection 2-4 mg, 2-4 mg, Intravenous, Q2HRS PRN, Codi Singh M.D., 4 mg at 03/17/17 0849  •  NS infusion 2,586 mL, 30 mL/kg, Intravenous, Once PRN, Codi Singh M.D.  •  NS (BOLUS) infusion 500 mL, 500 mL, Intravenous, Once PRN, Codi Singh M.D., Stopped at 03/17/17 0846    Labs:  Recent Labs      03/20/17   0127  03/22/17   0330   WBC  8.1  5.9   RBC  2.75*  2.87*   HEMOGLOBIN  8.1*  8.2*   HEMATOCRIT  23.4*  25.3*   MCV  85.1  88.2   MCH  29.5  28.6   RDW  43.4  46.5   PLATELETCT  208  235   MPV  10.5  10.3   NEUTSPOLYS  80.50*  64.50   LYMPHOCYTES  7.40*  16.70*   MONOCYTES  10.30  9.70   EOSINOPHILS  0.10  3.60   BASOPHILS  0.20  0.20   RBCMORPHOLO   --   Normal     Recent Labs      03/20/17   0127  03/21/17   1724  03/22/17   0330   SODIUM  132*  133*  137   POTASSIUM  3.0*  4.0  3.9   CHLORIDE  97  96  99   CO2  26  27  29   GLUCOSE  134*  153*  137*   BUN  8  9  7*     Recent Labs      03/20/17   0127  03/21/17   1724  03/22/17   0330   ALBUMIN   --     --   2.9*   TBILIRUBIN   --    --   0.6   ALKPHOSPHAT   --    --   82   TOTPROTEIN   --    --   5.9*   ALTSGPT   --    --   5   ASTSGOT   --    --   15   CREATININE  0.81  0.88  0.82       Imaging:  Ct-extremity, Lower With Left    3/16/2017  3/16/2017 1:18 AM HISTORY/REASON FOR EXAM:  Atraumatic Pain/Swelling/Deformity. Left lateral thigh fluid collection Preoperative evaluation TECHNIQUE/EXAM DESCRIPTION AND NUMBER OF VIEWS:  CT scan of the LEFT lower extremity with contrast, with reconstructions. Thin helical 3 mm sections were obtained from the distal femur through the proximal tibia/fibula. Sagittal and coronal multiplanar reconstructions were generated from the axial images. A total of 100 mL of Omnipaque 350 nonionic contrast was administered  IV without complication. Up to date radiation dose reduction adjustments have been utilized to meet ALARA standards for radiation dose reduction. COMPARISON: Left lower extremity ultrasound 3/15/2017 FINDINGS: There is a left hip arthroplasty present There is left femoral hardware present. There is a left knee arthroplasty. There is a fluid collection in the left lateral thigh. It is significantly obscured by artifact from the hardware. It is probably better assessed on the ultrasound 3/16/2017. There does however appear to be a mid/distal thigh fluid collection which by CT measures approximately 15 cm in length. Location is probably subcutaneous.     3/16/2017  1.  Limited assessment of left lateral thigh fluid collection due to artifact secondary to hardware 2.  Fluid collection is at least 15 cm in length and probably subcutaneous in location 3.  Left hip arthroplasty, left knee arthroplasty, left femoral hardware present    Us-liver And Biliary Tree    3/16/2017  3/16/2017 4:25 AM HISTORY/REASON FOR EXAM:  Abnormal liver function test Abdominal pain TECHNIQUE/EXAM DESCRIPTION AND NUMBER OF VIEWS:  Real-time sonography of the liver and biliary tree. COMPARISON:  Abdominal ultrasound 6/16/2016 FINDINGS: The liver is normal in contour with increased echogenicity. There is no evidence of solid mass lesion. The liver measures 15.7 cm. The gallbladder is absent. The common duct measures 1.5 cm. The visualized pancreas is unremarkable. There is aortic atherosclerosis. Intrahepatic IVC is patent. The portal vein is patent with hepatopetal flow. The right kidney measures 8.1 cm. There is no ascites.     3/16/2017  1.  Prior cholecystectomy 2.  Moderate biliary dilation with common bile duct measuring 1.5 cm 3.  Fatty infiltration of the liver     Dx-portable Fluoroscopy < 1 Hour    3/16/2017  3/16/2017 6:13 PM HISTORY/REASON FOR EXAM:  Left hip irrigation and debridement. Left lateral thigh fluid collection. Left hip arthroplasty. TECHNIQUE/EXAM DESCRIPTION AND NUMBER OF VIEWS:  1 views of the LEFT hip. COMPARISON: CT scan 3/16/2017 FINDINGS: Single fluoroscopic image demonstrates a needle projecting over the left hip arthroplasty laterally. Fluoroscopy time of 6 seconds  was utilized by THIEN CORREA for this procedure.     3/16/2017  Intraoperative fluoroscopic spot images as described above.    Le Venous Duplex (specify In Comments Left, Right Or Bilateral)    3/16/2017   Vascular Laboratory  CONCLUSIONS  1.  Normal left lower extremity superficial and deep venous examination.  2.  In the left lateral thigh deep to the incision  There is a large complex fluid collection measuring 17.3 cm in length and  5.9 x 3.5 cm transversely. Differential includes postoperative seroma,  hematoma, or infected fluid.  ROGERS STRICKLAND  Exam Date:     03/15/2017 22:52  Room #:     Inpatient  Priority:     Stat  Ht (in):             Wt (lb):  Ordering Physician:        ANIBAL VAUGHN  Referring Physician:       [id number]  Sonographer:               Mayur Manuel RDMS, RUBY  Study Type:                Complete Unilateral  Technical Quality:          Adequate  Age:    66    Gender:     F  MRN:    [id number]  :    1950      BSA:  Indications:     Pain in left leg  CPT Codes:       26883  ICD Codes:       [id number]  History:         Pain and swelling of the left thigh  Limitations:  PROCEDURES:  Left lower extremity venous duplex imaging.  The following venous structures were evaluated: common femoral, profunda  femoral, greater saphenous, femoral, popliteal , peroneal and posterior  tibial veins.  Serial compression, augmentation maneuvers,  color and spectral Doppler  flow evaluations were performed.  FINDINGS:  Left lower extremity -.  Complete color filling and compressibility with normal venous flow dynamics  including spontaneous flow, response to augmentation maneuvers, and  respiratory phasicity.  No superficial or deep venous thrombosis.  The peroneal and posterior tibial veins are difficult to assess for  compressibility, but flow response to augmentation is demonstrated.  Lateral left thigh at the area of concern: 17.3x5.9x3.5cm complex fluid  collection. This area is at a previous incision site.  Flow was evaluated in the contralateral common femoral vein and normal  venous flow dynamics including spontaneous flow, respiratory phasic  variation and augmentation were demonstrated.  Zander Cruz  (Electronically Signed)  Final Date:      2017                   00:09    Dx-hip-unilateral-without Pelvis-1 View Left    3/16/2017  3/16/2017 6:13 PM HISTORY/REASON FOR EXAM:  Left hip irrigation and debridement. Left lateral thigh fluid collection. Left hip arthroplasty. TECHNIQUE/EXAM DESCRIPTION AND NUMBER OF VIEWS:  1 views of the LEFT hip. COMPARISON: CT scan 3/16/2017 FINDINGS: Single fluoroscopic image demonstrates a needle projecting over the left hip arthroplasty laterally. Fluoroscopy time of 6 seconds  was utilized by THIEN CORREA for this procedure.     3/16/2017  Intraoperative fluoroscopic spot images as described  above.      Micro:  BLOOD CULTURE   Date Value Ref Range Status   03/17/2017   Preliminary    No Growth    Note: Blood cultures are incubated for 5 days and  are monitored continuously.Positive blood cultures  are called to the RN and reported as soon as  they are identified.          Assessment:  Active Hospital Problems    Diagnosis   • *Fluid collection at surgical site [T88.8XXA]   • Staphylococcus aureus bacteremia [R78.81]   • Fatty liver disease, nonalcoholic [K76.0]   • Dental caries [K02.9]   • Common bile duct dilatation [K83.8]   • Elevated LFTs [R79.89]   • Depression [F32.9]   • Vitamin D deficiency [E55.9]   • Diabetes mellitus type II, controlled (CMS-HCC) [E11.9]   • HTN (hypertension) [I10]   • HLD (hyperlipidemia) [E78.5]   • GERD (gastroesophageal reflux disease) [K21.9]   • Urinary incontinence with continuous leakage [N39.45]       Plan:  Left femur hardware infection with abscess  S/p removal 03/16/17  Fluid collection seen on CT  , CRP 22  OR gram stain +GPC, Cx - MSSA  D/c'd zosyn and vanco 3/18  Continue IV Cefazolin. Anticipate 6 week course from 3/16/17, end date 4/27/17.  S/p repeat I&D with liner exchange on 3/19 with abx bead placement  OR cx 3/19- MSSA    Left septic knee arthritis  S/p arthrotomy and drainage of knee with liner exchange 3/16  S/p repeat I&D as above.  Abx per above    MSSA bacteremia  3/16 Bcx - positive   3/17 Bcx - NGTD  Abx per above  YOMI if feasible to definitively r/o infective endocarditis    DM2  Optimize BS control to assist with wound healing, DM can worsen infection.    SW looking into [facility].

## 2017-03-22 NOTE — CARE PLAN
Problem: Safety  Goal: Will remain free from falls  Outcome: PROGRESSING AS EXPECTED    Problem: Pain Management  Goal: Pain level will decrease to patient’s comfort goal  Outcome: PROGRESSING AS EXPECTED

## 2017-03-22 NOTE — CARE PLAN
Problem: Mobility  Goal: Risk for activity intolerance will decrease  Outcome: PROGRESSING AS EXPECTED  Pt able to ambulate with help of FWW, and standby assist, calls for help when needed, steady gait. Hourly rounds in place.

## 2017-03-23 LAB
ALBUMIN SERPL BCP-MCNC: 3 G/DL (ref 3.2–4.9)
ALBUMIN/GLOB SERPL: 1 G/DL
ALP SERPL-CCNC: 80 U/L (ref 30–99)
ALT SERPL-CCNC: 5 U/L (ref 2–50)
ANION GAP SERPL CALC-SCNC: 7 MMOL/L (ref 0–11.9)
ANISOCYTOSIS BLD QL SMEAR: ABNORMAL
AST SERPL-CCNC: 14 U/L (ref 12–45)
BASOPHILS # BLD AUTO: 0 % (ref 0–1.8)
BASOPHILS # BLD: 0 K/UL (ref 0–0.12)
BENZODIAZ SERPL QL: NEGATIVE
BILIRUB SERPL-MCNC: 0.6 MG/DL (ref 0.1–1.5)
BUN SERPL-MCNC: 7 MG/DL (ref 8–22)
CALCIUM SERPL-MCNC: 9.7 MG/DL (ref 8.5–10.5)
CHLORIDE SERPL-SCNC: 99 MMOL/L (ref 96–112)
CO2 SERPL-SCNC: 28 MMOL/L (ref 20–33)
CREAT SERPL-MCNC: 0.78 MG/DL (ref 0.5–1.4)
DACRYOCYTES BLD QL SMEAR: NORMAL
EOSINOPHIL # BLD AUTO: 0.21 K/UL (ref 0–0.51)
EOSINOPHIL NFR BLD: 3.5 % (ref 0–6.9)
ERYTHROCYTE [DISTWIDTH] IN BLOOD BY AUTOMATED COUNT: 47 FL (ref 35.9–50)
GFR SERPL CREATININE-BSD FRML MDRD: >60 ML/MIN/1.73 M 2
GLOBULIN SER CALC-MCNC: 2.9 G/DL (ref 1.9–3.5)
GLUCOSE BLD-MCNC: 118 MG/DL (ref 65–99)
GLUCOSE BLD-MCNC: 132 MG/DL (ref 65–99)
GLUCOSE BLD-MCNC: 144 MG/DL (ref 65–99)
GLUCOSE BLD-MCNC: 158 MG/DL (ref 65–99)
GLUCOSE SERPL-MCNC: 137 MG/DL (ref 65–99)
HCT VFR BLD AUTO: 24.3 % (ref 37–47)
HGB BLD-MCNC: 7.9 G/DL (ref 12–16)
IRON SATN MFR SERPL: 11 % (ref 15–55)
IRON SERPL-MCNC: 29 UG/DL (ref 40–170)
LYMPHOCYTES # BLD AUTO: 0.58 K/UL (ref 1–4.8)
LYMPHOCYTES NFR BLD: 9.5 % (ref 22–41)
MAGNESIUM SERPL-MCNC: 2.2 MG/DL (ref 1.5–2.5)
MANUAL DIFF BLD: NORMAL
MCH RBC QN AUTO: 28.7 PG (ref 27–33)
MCHC RBC AUTO-ENTMCNC: 32.5 G/DL (ref 33.6–35)
MCV RBC AUTO: 88.4 FL (ref 81.4–97.8)
MICROCYTES BLD QL SMEAR: ABNORMAL
MONOCYTES # BLD AUTO: 0.16 K/UL (ref 0–0.85)
MONOCYTES NFR BLD AUTO: 2.6 % (ref 0–13.4)
MORPHOLOGY BLD-IMP: NORMAL
MYELOCYTES NFR BLD MANUAL: 0.9 %
NEUTROPHILS # BLD AUTO: 5.09 K/UL (ref 2–7.15)
NEUTROPHILS NFR BLD: 83.5 % (ref 44–72)
NRBC # BLD AUTO: 0.04 K/UL
NRBC BLD AUTO-RTO: 0.7 /100 WBC
PLATELET # BLD AUTO: 274 K/UL (ref 164–446)
PLATELET BLD QL SMEAR: NORMAL
PMV BLD AUTO: 10.3 FL (ref 9–12.9)
POTASSIUM SERPL-SCNC: 4 MMOL/L (ref 3.6–5.5)
PROT SERPL-MCNC: 5.9 G/DL (ref 6–8.2)
RBC # BLD AUTO: 2.75 M/UL (ref 4.2–5.4)
RBC BLD AUTO: PRESENT
SODIUM SERPL-SCNC: 134 MMOL/L (ref 135–145)
TIBC SERPL-MCNC: 265 UG/DL (ref 250–450)
TRICYCLICS SERPL QL: NEGATIVE
VIT B12 SERPL-MCNC: 268 PG/ML (ref 211–911)
WBC # BLD AUTO: 6.1 K/UL (ref 4.8–10.8)

## 2017-03-23 PROCEDURE — 700111 HCHG RX REV CODE 636 W/ 250 OVERRIDE (IP): Performed by: INTERNAL MEDICINE

## 2017-03-23 PROCEDURE — 700105 HCHG RX REV CODE 258

## 2017-03-23 PROCEDURE — 97530 THERAPEUTIC ACTIVITIES: CPT

## 2017-03-23 PROCEDURE — 700102 HCHG RX REV CODE 250 W/ 637 OVERRIDE(OP): Performed by: INTERNAL MEDICINE

## 2017-03-23 PROCEDURE — 700111 HCHG RX REV CODE 636 W/ 250 OVERRIDE (IP)

## 2017-03-23 PROCEDURE — B246ZZ4 ULTRASONOGRAPHY OF RIGHT AND LEFT HEART, TRANSESOPHAGEAL: ICD-10-PCS | Performed by: INTERNAL MEDICINE

## 2017-03-23 PROCEDURE — 83540 ASSAY OF IRON: CPT

## 2017-03-23 PROCEDURE — A9270 NON-COVERED ITEM OR SERVICE: HCPCS | Performed by: HOSPITALIST

## 2017-03-23 PROCEDURE — 700102 HCHG RX REV CODE 250 W/ 637 OVERRIDE(OP): Performed by: HOSPITALIST

## 2017-03-23 PROCEDURE — 770006 HCHG ROOM/CARE - MED/SURG/GYN SEMI*

## 2017-03-23 PROCEDURE — 82607 VITAMIN B-12: CPT

## 2017-03-23 PROCEDURE — 97116 GAIT TRAINING THERAPY: CPT

## 2017-03-23 PROCEDURE — A9270 NON-COVERED ITEM OR SERVICE: HCPCS | Performed by: INTERNAL MEDICINE

## 2017-03-23 PROCEDURE — 83550 IRON BINDING TEST: CPT

## 2017-03-23 PROCEDURE — 82962 GLUCOSE BLOOD TEST: CPT | Mod: 91

## 2017-03-23 PROCEDURE — 93325 DOPPLER ECHO COLOR FLOW MAPG: CPT

## 2017-03-23 PROCEDURE — 99233 SBSQ HOSP IP/OBS HIGH 50: CPT | Performed by: HOSPITALIST

## 2017-03-23 PROCEDURE — 80053 COMPREHEN METABOLIC PANEL: CPT

## 2017-03-23 PROCEDURE — 83735 ASSAY OF MAGNESIUM: CPT

## 2017-03-23 PROCEDURE — 85007 BL SMEAR W/DIFF WBC COUNT: CPT

## 2017-03-23 PROCEDURE — 700111 HCHG RX REV CODE 636 W/ 250 OVERRIDE (IP): Performed by: ORTHOPAEDIC SURGERY

## 2017-03-23 PROCEDURE — 93312 ECHO TRANSESOPHAGEAL: CPT

## 2017-03-23 PROCEDURE — 85027 COMPLETE CBC AUTOMATED: CPT

## 2017-03-23 RX ORDER — SODIUM CHLORIDE 9 MG/ML
INJECTION, SOLUTION INTRAVENOUS
Status: COMPLETED
Start: 2017-03-23 | End: 2017-03-23

## 2017-03-23 RX ORDER — FERROUS GLUCONATE 324(38)MG
324 TABLET ORAL 2 TIMES DAILY WITH MEALS
Status: DISCONTINUED | OUTPATIENT
Start: 2017-03-23 | End: 2017-03-27 | Stop reason: HOSPADM

## 2017-03-23 RX ORDER — CHOLECALCIFEROL (VITAMIN D3) 125 MCG
1000 CAPSULE ORAL DAILY
Status: DISCONTINUED | OUTPATIENT
Start: 2017-03-23 | End: 2017-03-27 | Stop reason: HOSPADM

## 2017-03-23 RX ADMIN — CEFAZOLIN SODIUM 2 G: 2 INJECTION, SOLUTION INTRAVENOUS at 06:06

## 2017-03-23 RX ADMIN — POTASSIUM CHLORIDE 30 MEQ: 750 TABLET, FILM COATED, EXTENDED RELEASE ORAL at 08:30

## 2017-03-23 RX ADMIN — TRAZODONE HYDROCHLORIDE 150 MG: 150 TABLET ORAL at 21:30

## 2017-03-23 RX ADMIN — SODIUM CHLORIDE 500 ML: 9 INJECTION, SOLUTION INTRAVENOUS at 21:31

## 2017-03-23 RX ADMIN — CEFAZOLIN SODIUM 2 G: 2 INJECTION, SOLUTION INTRAVENOUS at 15:17

## 2017-03-23 RX ADMIN — CEFAZOLIN SODIUM 2 G: 2 INJECTION, SOLUTION INTRAVENOUS at 21:30

## 2017-03-23 RX ADMIN — FERROUS GLUCONATE 324 MG: 324 TABLET ORAL at 16:46

## 2017-03-23 RX ADMIN — OXYCODONE HYDROCHLORIDE 5 MG: 5 TABLET ORAL at 08:30

## 2017-03-23 RX ADMIN — Medication 2 TABLET: at 08:30

## 2017-03-23 RX ADMIN — OXYBUTYNIN CHLORIDE 10 MG: 5 TABLET ORAL at 21:30

## 2017-03-23 RX ADMIN — OMEPRAZOLE 20 MG: 20 CAPSULE, DELAYED RELEASE ORAL at 08:30

## 2017-03-23 RX ADMIN — ENOXAPARIN SODIUM 40 MG: 100 INJECTION SUBCUTANEOUS at 08:31

## 2017-03-23 RX ADMIN — SERTRALINE 100 MG: 100 TABLET, FILM COATED ORAL at 08:30

## 2017-03-23 RX ADMIN — LOSARTAN POTASSIUM 50 MG: 50 TABLET, FILM COATED ORAL at 08:30

## 2017-03-23 RX ADMIN — CYANOCOBALAMIN TAB 500 MCG 1000 MCG: 500 TAB at 15:16

## 2017-03-23 RX ADMIN — Medication 2 TABLET: at 21:30

## 2017-03-23 RX ADMIN — POTASSIUM CHLORIDE 30 MEQ: 750 TABLET, FILM COATED, EXTENDED RELEASE ORAL at 21:30

## 2017-03-23 RX ADMIN — OXYCODONE HYDROCHLORIDE 10 MG: 10 TABLET ORAL at 18:20

## 2017-03-23 ASSESSMENT — PAIN SCALES - GENERAL
PAINLEVEL_OUTOF10: 0
PAINLEVEL_OUTOF10: 2
PAINLEVEL_OUTOF10: 1
PAINLEVEL_OUTOF10: 0
PAINLEVEL_OUTOF10: 1

## 2017-03-23 ASSESSMENT — ENCOUNTER SYMPTOMS
PALPITATIONS: 0
DIARRHEA: 0
BACK PAIN: 0
NECK PAIN: 0
DIZZINESS: 0
CHILLS: 0
CONSTIPATION: 1
FEVER: 0
HEADACHES: 0
INSOMNIA: 0
NAUSEA: 0
SORE THROAT: 0
BLURRED VISION: 0
DEPRESSION: 0
TINGLING: 0
SHORTNESS OF BREATH: 0
COUGH: 0
ABDOMINAL PAIN: 0
EYE PAIN: 0
VOMITING: 0

## 2017-03-23 ASSESSMENT — GAIT ASSESSMENTS
GAIT LEVEL OF ASSIST: STAND BY ASSIST
DEVIATION: DECREASED BASE OF SUPPORT;DECREASED HEEL STRIKE
ASSISTIVE DEVICE: FRONT WHEEL WALKER
DISTANCE (FEET): 25

## 2017-03-23 NOTE — DISCHARGE PLANNING
Medical Social Work    Per attending physician, pt is not medically clear. Pt has been accepted to [facility]. [facility] and [facility] SNFs still pending.     Plan: Pt to D/C to SNF when medically clear.

## 2017-03-23 NOTE — THERAPY
"Pt is a 65y/o female s/p L hip Fx  who  developed an infection requiring arthrocentesis and arthrotomy to remove infected hardware. . Pt MOD I  for med mob, SBA for transfers, on off toilet and gait w/FWW, Pt sat [facility]IC post tx and performed SLRs before getting OOB for amb. Pt will benifit from continued acute/post acute rehab.Physical Therapy Treatment completed.   Bed Mobility:  Supine to Sit: Modified Independent  Transfers: Sit to Stand: Stand by Assist  Gait: Level Of Assist: Stand by Assist with Front-Wheel Walker       Plan of Care: Will benefit from Physical Therapy 3 times per week  Discharge Recommendations: Equipment: Front-Wheel Walker. Post-acute therapy Discharge to a transitional care facility for continued skilled therapy services.     See \"Rehab Therapy-Acute\" Patient Summary Report for complete documentation.       "

## 2017-03-23 NOTE — CARE PLAN
Problem: Pain Management  Goal: Pain level will decrease to patient’s comfort goal  Intervention: Follow pain managment plan developed in collaboration with patient and Interdisciplinary Team  Pt taking 5mg oxycodone prn for pain      Problem: Mobility  Goal: Risk for activity intolerance will decrease  Outcome: PROGRESSING AS EXPECTED  WBAT to LLE, oob to chair this am

## 2017-03-23 NOTE — PROGRESS NOTES
Infectious Disease Progress Note    Author: JIMMIE Roche DOS & Time created: 3/23/2017  2:20 PM    Chief Complaint:  Chief Complaint   Patient presents with   • Leg Pain     L   FU for left femoral hardware infection and left septic knee arthritis.  S/p I&D to L knee and thigh, with placement of abx beads to L knee and thigh on 3/19 with Dr. Irving.    Interval History:  3/18 AF, WBC 7.2, states her drains were removed yesterday, is willing to work with PT, pain stable and will return to the OR early next week, Bx - MSSA  3/19 AF, no CBC, plan to return to OR today for repeat I&D and liner exchange  3/20 Tm 99.7, WBC 8.1.  Pain is continuous and severe, 10/10 with movement and 6/10 at rest.  3/21- AF, no CBC.  Pain improved, down to 4/10 to L leg.  Would like to consider home infusion versus SNF.  3/22- Tm 100.4, WBC 5.9.  Left leg pain remains around 4/10.  Now ok going to a SNF, as long as it is in [place].  Tolerating abx without SE.  3/23- Tm 99.5, WBC 6.1.  Left leg pain continues around 4/10.  NPO, awaiting YOMI.  Labs Reviewed, Medications Reviewed, Radiology Reviewed and Wound Reviewed.    Review of Systems:  Review of Systems   Constitutional: Negative for fever and chills.   Respiratory: Negative for cough and shortness of breath.    Cardiovascular: Negative for chest pain.   Gastrointestinal: Positive for constipation. Negative for nausea, vomiting, abdominal pain and diarrhea.        Last BM on 3/13   Genitourinary: Negative for dysuria.   Musculoskeletal: Positive for joint pain.        Improving   Skin: Negative for rash.   Neurological: Negative for headaches.       Hemodynamics:  Temp (24hrs), Av.1 °C (98.7 °F), Min:36.9 °C (98.4 °F), Max:37.4 °C (99.3 °F)  Temperature: 36.9 °C (98.4 °F)  Pulse  Av.2  Min: 53  Max: 97   Blood Pressure : 143/72 mmHg       Physical Exam:  Physical Exam   Constitutional: She is oriented to person, place, and time. She appears well-developed  and well-nourished. No distress.   Obese   HENT:   Head: Normocephalic and atraumatic.   Eyes: EOM are normal. Pupils are equal, round, and reactive to light.   Neck: Normal range of motion. Neck supple.   Cardiovascular: Normal rate, regular rhythm and normal heart sounds.    Pulmonary/Chest: Effort normal and breath sounds normal. No respiratory distress.   Abdominal: Soft. Bowel sounds are normal. She exhibits no distension. There is no tenderness.   Musculoskeletal: She exhibits tenderness.   LLE- rigid original surgical dressing in place.  Toes warm.  R knee surgical scar clean  RUE PICC- non tender, no erythema.   Neurological: She is alert and oriented to person, place, and time.   Skin: Skin is warm and dry. No erythema.   Nursing note and vitals reviewed.      Meds:    Current facility-administered medications:   •  PICC Line Insertion has been implemented, , , Once **AND** May use Lidocaine 1% not to exceed 3 mls for local at insertion site, , , CONTINUOUS **AND** NOTIFY MD, , , Once **AND** Tip to dwell in the superior vena cava, , , CONTINUOUS **AND** Do not use PICC Line until placement verified by Chest X Ray, , , CONTINUOUS **AND** DX-CHEST-FOR PICC LINE Perform procedure in:: PICC Room, , , Once **AND** If radiologist reading of chest X-ray states any of the following the PICC should be used, , , CONTINUOUS **AND** Further evaluation of the PICC placement can be retrieved from X-Ray and Imaging, , , CONTINUOUS **AND** Blood draws through PICC line; draws by RN only, , , CONTINUOUS **AND** FLUSHING GUIDELINES WHEN IN USE, , , CONTINUOUS **AND** normal saline PF 10-20 mL, 10-20 mL, Intravenous, PRN **AND** FLUSHING GUIDELINES WHEN NOT IN USE, , , CONTINUOUS **AND** DRESSING MAINTENANCE, , , Once **AND** Change needleless pressure ports and IV tubing every 72 hours per hospital policy, , , CONTINUOUS **AND** TUBING, , , CONTINUOUS **AND** If there is an MD order to remove the PICC line, any RN may  remove the PICC line, , , CONTINUOUS **AND** [] PATIENT EDUCATION MATERIALS, , , Prior to discharge **AND** NURSING COMMUNICATION, , , CONTINUOUS, JIMMIE Roche  •  potassium chloride ER (KLOR-CON) tablet 30 mEq, 30 mEq, Oral, BID, David Sylvester M.D., 30 mEq at 17 0830  •  tobramycin (NEBCIN) injection 1.2 g, 1.2 g, Other, Intra-Op Once PRN, Rigo Irving M.D.  •  hydrALAZINE (APRESOLINE) injection 10 mg, 10 mg, Intravenous, Q6HRS PRN, David Sylvester M.D., 10 mg at 17 1615  •  insulin lispro (HUMALOG) injection 1-6 Units, 1-6 Units, Subcutaneous, 4X/DAY ACHS, Davdi Sylvester M.D., Stopped at 17 0700  •  ceFAZolin (ANCEF) IVPB 2 g, 2 g, Intravenous, Q8HRS, Bre Oconnor M.D., 2 g at 17 0606  •  oxycodone immediate-release (ROXICODONE) tablet 5 mg, 5 mg, Oral, Q4HRS PRN, 5 mg at 17 0830 **OR** oxycodone immediate release (ROXICODONE) tablet 10 mg, 10 mg, Oral, Q4HRS PRN, David Sylvester M.D., 10 mg at 17 2124  •  Action is required: Protocol 1073 Hypoglycemia has been implemented, , , Once **AND** Protocol 1073 Inclusion Criteria, , , CONTINUOUS **AND** Protocol 1073 NOTIFY, , , Once **AND** Protocol 1073 Initiate protocol immediately if FSBG is less than or equal to 70 mg/dL, , , CONTINUOUS **AND** glucose 4 g chewable tablet 16 g, 16 g, Oral, Q15 MIN PRN **AND** dextrose 50% (D50W) injection 25 mL, 25 mL, Intravenous, Q15 MIN PRN, David Sylvester M.D.  •  enoxaparin (LOVENOX) inj 40 mg, 40 mg, Subcutaneous, DAILY, Rigo Irving M.D., 40 mg at 17  •  losartan (COZAAR) tablet 50 mg, 50 mg, Oral, DAILY, Codi Singh M.D., 50 mg at 17  •  omeprazole (PRILOSEC) capsule 20 mg, 20 mg, Oral, DAILY, Codi Singh M.D., 20 mg at 17  •  oxybutynin (DITROPAN) tablet 10 mg, 10 mg, Oral, QHS, Codi Singh M.D., 10 mg at 17  •  sertraline (ZOLOFT) tablet 100 mg, 100 mg, Oral, DAILY, Codi LANDEROS  HANSA Singh, 100 mg at 03/23/17 0830  •  trazodone (DESYREL) tablet 150 mg, 150 mg, Oral, QHS PRN, Codi Singh M.D., 150 mg at 03/22/17 2042  •  tramadol (ULTRAM) 50 MG tablet 50 mg, 50 mg, Oral, Q6HRS PRN, Codi Singh M.D., 50 mg at 03/22/17 2356  •  senna-docusate (PERICOLACE or SENOKOT S) 8.6-50 MG per tablet 2 Tab, 2 Tab, Oral, BID, 2 Tab at 03/23/17 0830 **AND** polyethylene glycol/lytes (MIRALAX) PACKET 1 Packet, 1 Packet, Oral, QDAY PRN, 1 Packet at 03/22/17 2040 **AND** magnesium hydroxide (MILK OF MAGNESIA) suspension 30 mL, 30 mL, Oral, QDAY PRN, 30 mL at 03/21/17 2133 **AND** bisacodyl (DULCOLAX) suppository 10 mg, 10 mg, Rectal, QDAY PRN, Codi Singh M.D., 10 mg at 03/23/17 0606  •  ondansetron (ZOFRAN) syringe/vial injection 4 mg, 4 mg, Intravenous, Q4HRS PRN, Codi Singh M.D.  •  ondansetron (ZOFRAN ODT) dispertab 4 mg, 4 mg, Oral, Q4HRS PRN, Codi Singh M.D.  •  morphine (pf) 4 mg/ml injection 2-4 mg, 2-4 mg, Intravenous, Q2HRS PRN, Codi Singh M.D., 4 mg at 03/17/17 0849  •  NS infusion 2,586 mL, 30 mL/kg, Intravenous, Once PRN, Codi Singh M.D.  •  NS (BOLUS) infusion 500 mL, 500 mL, Intravenous, Once PRN, Codi Singh M.D., Stopped at 03/17/17 0846    Labs:  Recent Labs      03/22/17   0330  03/23/17   0330   WBC  5.9  6.1   RBC  2.87*  2.75*   HEMOGLOBIN  8.2*  7.9*   HEMATOCRIT  25.3*  24.3*   MCV  88.2  88.4   MCH  28.6  28.7   RDW  46.5  47.0   PLATELETCT  235  274   MPV  10.3  10.3   NEUTSPOLYS  64.50  83.50*   LYMPHOCYTES  16.70*  9.50*   MONOCYTES  9.70  2.60   EOSINOPHILS  3.60  3.50   BASOPHILS  0.20  0.00   RBCMORPHOLO  Normal  Present     Recent Labs      03/21/17   1724  03/22/17   0330  03/23/17   0330   SODIUM  133*  137  134*   POTASSIUM  4.0  3.9  4.0   CHLORIDE  96  99  99   CO2  27  29  28   GLUCOSE  153*  137*  137*   BUN  9  7*  7*     Recent Labs      03/21/17   1724  03/22/17   0330  03/23/17   0330   ALBUMIN   --   2.9*  3.0*   TBILIRUBIN   --    0.6  0.6   ALKPHOSPHAT   --   82  80   TOTPROTEIN   --   5.9*  5.9*   ALTSGPT   --   5  5   ASTSGOT   --   15  14   CREATININE  0.88  0.82  0.78       Imaging:  Ct-extremity, Lower With Left    3/16/2017  3/16/2017 1:18 AM HISTORY/REASON FOR EXAM:  Atraumatic Pain/Swelling/Deformity. Left lateral thigh fluid collection Preoperative evaluation TECHNIQUE/EXAM DESCRIPTION AND NUMBER OF VIEWS:  CT scan of the LEFT lower extremity with contrast, with reconstructions. Thin helical 3 mm sections were obtained from the distal femur through the proximal tibia/fibula. Sagittal and coronal multiplanar reconstructions were generated from the axial images. A total of 100 mL of Omnipaque 350 nonionic contrast was administered  IV without complication. Up to date radiation dose reduction adjustments have been utilized to meet ALARA standards for radiation dose reduction. COMPARISON: Left lower extremity ultrasound 3/15/2017 FINDINGS: There is a left hip arthroplasty present There is left femoral hardware present. There is a left knee arthroplasty. There is a fluid collection in the left lateral thigh. It is significantly obscured by artifact from the hardware. It is probably better assessed on the ultrasound 3/16/2017. There does however appear to be a mid/distal thigh fluid collection which by CT measures approximately 15 cm in length. Location is probably subcutaneous.     3/16/2017  1.  Limited assessment of left lateral thigh fluid collection due to artifact secondary to hardware 2.  Fluid collection is at least 15 cm in length and probably subcutaneous in location 3.  Left hip arthroplasty, left knee arthroplasty, left femoral hardware present    Us-liver And Biliary Tree    3/16/2017  3/16/2017 4:25 AM HISTORY/REASON FOR EXAM:  Abnormal liver function test Abdominal pain TECHNIQUE/EXAM DESCRIPTION AND NUMBER OF VIEWS:  Real-time sonography of the liver and biliary tree. COMPARISON: Abdominal ultrasound 6/16/2016 FINDINGS:  The liver is normal in contour with increased echogenicity. There is no evidence of solid mass lesion. The liver measures 15.7 cm. The gallbladder is absent. The common duct measures 1.5 cm. The visualized pancreas is unremarkable. There is aortic atherosclerosis. Intrahepatic IVC is patent. The portal vein is patent with hepatopetal flow. The right kidney measures 8.1 cm. There is no ascites.     3/16/2017  1.  Prior cholecystectomy 2.  Moderate biliary dilation with common bile duct measuring 1.5 cm 3.  Fatty infiltration of the liver     Dx-portable Fluoroscopy < 1 Hour    3/16/2017  3/16/2017 6:13 PM HISTORY/REASON FOR EXAM:  Left hip irrigation and debridement. Left lateral thigh fluid collection. Left hip arthroplasty. TECHNIQUE/EXAM DESCRIPTION AND NUMBER OF VIEWS:  1 views of the LEFT hip. COMPARISON: CT scan 3/16/2017 FINDINGS: Single fluoroscopic image demonstrates a needle projecting over the left hip arthroplasty laterally. Fluoroscopy time of 6 seconds  was utilized by THIEN CORREA for this procedure.     3/16/2017  Intraoperative fluoroscopic spot images as described above.    Le Venous Duplex (specify In Comments Left, Right Or Bilateral)    3/16/2017   Vascular Laboratory  CONCLUSIONS  1.  Normal left lower extremity superficial and deep venous examination.  2.  In the left lateral thigh deep to the incision  There is a large complex fluid collection measuring 17.3 cm in length and  5.9 x 3.5 cm transversely. Differential includes postoperative seroma,  hematoma, or infected fluid.  ROGERS STRICKLAND  Exam Date:     03/15/2017 22:52  Room #:     Inpatient  Priority:     Stat  Ht (in):             Wt (lb):  Ordering Physician:        ANIBAL VAUGHN  Referring Physician:       [id number]  Sonographer:               Mayur Manuel RDMS, RUBY  Study Type:                Complete Unilateral  Technical Quality:         Adequate  Age:    66    Gender:     F  MRN:     [id number]  :    1950      BSA:  Indications:     Pain in left leg  CPT Codes:       74903  ICD Codes:       [id number]  History:         Pain and swelling of the left thigh  Limitations:  PROCEDURES:  Left lower extremity venous duplex imaging.  The following venous structures were evaluated: common femoral, profunda  femoral, greater saphenous, femoral, popliteal , peroneal and posterior  tibial veins.  Serial compression, augmentation maneuvers,  color and spectral Doppler  flow evaluations were performed.  FINDINGS:  Left lower extremity -.  Complete color filling and compressibility with normal venous flow dynamics  including spontaneous flow, response to augmentation maneuvers, and  respiratory phasicity.  No superficial or deep venous thrombosis.  The peroneal and posterior tibial veins are difficult to assess for  compressibility, but flow response to augmentation is demonstrated.  Lateral left thigh at the area of concern: 17.3x5.9x3.5cm complex fluid  collection. This area is at a previous incision site.  Flow was evaluated in the contralateral common femoral vein and normal  venous flow dynamics including spontaneous flow, respiratory phasic  variation and augmentation were demonstrated.  Zander Cruz  (Electronically Signed)  Final Date:      2017                   00:09    Dx-hip-unilateral-without Pelvis-1 View Left    3/16/2017  3/16/2017 6:13 PM HISTORY/REASON FOR EXAM:  Left hip irrigation and debridement. Left lateral thigh fluid collection. Left hip arthroplasty. TECHNIQUE/EXAM DESCRIPTION AND NUMBER OF VIEWS:  1 views of the LEFT hip. COMPARISON: CT scan 3/16/2017 FINDINGS: Single fluoroscopic image demonstrates a needle projecting over the left hip arthroplasty laterally. Fluoroscopy time of 6 seconds  was utilized by THIEN CORREA for this procedure.     3/16/2017  Intraoperative fluoroscopic spot images as described above.      Micro:  BLOOD CULTURE   Date Value Ref Range  Status   03/17/2017 No growth after 5 days of incubation.  Final        Assessment:  Active Hospital Problems    Diagnosis   • *Fluid collection at surgical site [T88.8XXA]   • Staphylococcus aureus bacteremia [R78.81]   • Fatty liver disease, nonalcoholic [K76.0]   • Dental caries [K02.9]   • Common bile duct dilatation [K83.8]   • Elevated LFTs [R79.89]   • Depression [F32.9]   • Vitamin D deficiency [E55.9]   • Diabetes mellitus type II, controlled (CMS-HCC) [E11.9]   • HTN (hypertension) [I10]   • HLD (hyperlipidemia) [E78.5]   • GERD (gastroesophageal reflux disease) [K21.9]   • Urinary incontinence with continuous leakage [N39.45]       Plan:  Left femur hardware infection with abscess  S/p removal 03/16/17  Fluid collection seen on CT  , CRP 22  OR gram stain +GPC, Cx - MSSA  D/c'd zosyn and vanco 3/18  Continue IV Cefazolin. Anticipate 6 week course from 3/16/17, end date 4/27/17.  S/p repeat I&D with liner exchange on 3/19 with abx bead placement  OR cx 3/19- MSSA    Left septic knee arthritis  S/p arthrotomy and drainage of knee with liner exchange 3/16  S/p repeat I&D as above.  Abx per above    MSSA bacteremia  3/16 Bcx - positive   3/17 Bcx - NGTD  Abx per above  YOMI today    DM2  Optimize BS control to assist with wound healing, DM can worsen infection.    SW looking into [facility].

## 2017-03-23 NOTE — PROGRESS NOTES
Hospital Medicine Progress Note, Adult, Complex               Author: Luis Hodges Date & Time created: 3/23/2017  9:13 AM     Interval History:  67 y/o female LLE fluid colleciton, s/p arthrotomy, hardware removal and left knee hardware removal with liner exchange. Cx positive for MSSA.     Did well ove rnight. No events. Feeling well with decreased pain.       Review of Systems:  Review of Systems   Constitutional: Negative for fever and chills.   HENT: Negative for sore throat.    Eyes: Negative for blurred vision and pain.   Respiratory: Negative for shortness of breath.    Cardiovascular: Negative for palpitations.   Gastrointestinal: Negative for nausea and abdominal pain.   Genitourinary: Negative for dysuria and urgency.   Musculoskeletal: Negative for back pain and neck pain.   Skin: Negative for itching and rash.   Neurological: Negative for dizziness, tingling and headaches.   Psychiatric/Behavioral: Negative for depression. The patient does not have insomnia.    All other systems reviewed and are negative.      Physical Exam:  Physical Exam   Constitutional: She is oriented to person, place, and time. She appears well-developed and well-nourished. No distress.   HENT:   Right Ear: External ear normal.   Left Ear: External ear normal.   Nose: Nose normal.   Mouth/Throat: No oropharyngeal exudate.   Eyes: Right eye exhibits no discharge. Left eye exhibits no discharge. No scleral icterus.   Neck: Normal range of motion. Neck supple. No JVD present.   Cardiovascular: Normal rate, regular rhythm, normal heart sounds and intact distal pulses.    No murmur heard.  Pulmonary/Chest: Effort normal and breath sounds normal. No stridor. No respiratory distress. She has no rales.   Abdominal: Soft. Bowel sounds are normal. She exhibits no distension. There is no tenderness.   Musculoskeletal: Normal range of motion. She exhibits edema and tenderness.   Multiple surgical scars, bandages in place   Neurological:  She is alert and oriented to person, place, and time. No cranial nerve deficit.   Skin: Skin is warm. No rash noted. She is diaphoretic. No erythema.   Psychiatric: She has a normal mood and affect. Her behavior is normal.   Nursing note and vitals reviewed.      Labs:        Invalid input(s): [id number]      Recent Labs      17   1724  17   0330  17   033   SODIUM  133*  137  134*   POTASSIUM  4.0  3.9  4.0   CHLORIDE  96  99  99   CO2  27  29  28   BUN  9  7*  7*   CREATININE  0.88  0.82  0.78   MAGNESIUM  1.2*  1.3*  2.2   CALCIUM  9.9  9.6  9.7     Recent Labs      17   1724  17   0330  17   033   ALTSGPT   --   5  5   ASTSGOT   --   15  14   ALKPHOSPHAT   --   82  80   TBILIRUBIN   --   0.6  0.6   GLUCOSE  153*  137*  137*     Recent Labs      17   0330  17   0330   RBC  2.87*  2.75*   HEMOGLOBIN  8.2*  7.9*   HEMATOCRIT  25.3*  24.3*   PLATELETCT  235  274   IRON   --   29*   TOTIRONBC   --   265     Recent Labs      17   0330  17   0330   WBC  5.9  6.1   NEUTSPOLYS  64.50  83.50*   LYMPHOCYTES  16.70*  9.50*   MONOCYTES  9.70  2.60   EOSINOPHILS  3.60  3.50   BASOPHILS  0.20  0.00   ASTSGOT  15  14   ALTSGPT  5  5   ALKPHOSPHAT  82  80   TBILIRUBIN  0.6  0.6           Hemodynamics:  Temp (24hrs), Av °C (98.6 °F), Min:36.7 °C (98.1 °F), Max:37.4 °C (99.3 °F)  Temperature: 36.9 °C (98.5 °F)  Pulse  Av.4  Min: 53  Max: 97   Blood Pressure : 128/90 mmHg     Respiratory:    Respiration: 18, Pulse Oximetry: 97 %           Fluids:  No intake or output data in the 24 hours ending 17     GI/Nutrition:  Orders Placed This Encounter   Procedures   • DIET ORDER     Standing Status: Standing      Number of Occurrences: 1      Standing Expiration Date:      Order Specific Question:  Diet:     Answer:  Diabetic [3]     Order Specific Question:  Diet:     Answer:  Low Fiber(GI Soft) [2]     Medical Decision Making, by Problem:  Active  Hospital Problems    Diagnosis   • *Fluid collection at surgical site [T88.8XXA]-c/f infection, doing well  -s/p hardware removal and arthrotomy and drainage of the left knee with liner exchange and I&D  -mssa in cx. abx per ID ongoing. ZOYA pending for today.    • Fatty liver disease, nonalcoholic [K76.0]   • Dental caries [K02.9]-recent teeth extraction, zoya pending as above.    • Common bile duct dilatation [K83.8]-consistent with previous cholecystectomy.    • Elevated LFTs [R79.89]-resolved. Normal workup.    • Depression [F32.9]-outpatient treatment, zoloft   • Vitamin D deficiency [E55.9]-replacement   • Diabetes mellitus type II, controlled (CMS-HCC) [E11.9]-ISS, adjust PRN   • HTN (hypertension) [I10]-well controlled, continue current meds   • HLD (hyperlipidemia) [E78.5]-statin   • GERD (gastroesophageal reflux disease) [K21.9]-PPI   • Urinary incontinence with continuous leakage [N39.45]-ditropan     Staph bacteremia-confirmed MSSA-abx per ID, recent dental work as above. ZOYA pending.   Hypokalemia-replace prn      Anemia- fe studies low. Start replacement.   Mag - repleated  Low b12- start replacement.     Discussed plan with RN    Labs reviewed, Medications reviewed and EKG reviewed  Shannon catheter: No Shannon        DVT prophylaxis - mechanical: SCDs      Assessed for rehab: Patient was assess for and/or received rehabilitation services during this hospitalization

## 2017-03-23 NOTE — PROGRESS NOTES
"   Orthopaedic Progress Note    Interval changes:  All drains out dressing CDI  Cleared for SNF DC by ortho pending medicine clearance    ROS - Patient denies any new issues.  Pain well controlled.    Blood pressure 143/72, pulse 60, temperature 36.9 °C (98.4 °F), temperature source Temporal, resp. rate 18, height 1.702 m (5' 7\"), weight 86.183 kg (190 lb), last menstrual period 10/01/1990, SpO2 92 %, not currently breastfeeding.      Patient seen and examined  No acute distress  Breathing non labored  RRR  LLE surgical dressing/ splint is clean, dry, and intact. Patient clearly fires tibialis anterior, EHL, and gastrocnemius/soleus. Sensation is intact to light touch throughout superficial peroneal, deep peroneal, tibial, saphenous, and sural nerve distributions. Strong and palpable 2+ dorsalis pedis and posterior tibial pulses with capillary refill less than 2 seconds. No lower leg tenderness or discomfort.      Recent Labs      03/22/17   0330  03/23/17   0330   WBC  5.9  6.1   RBC  2.87*  2.75*   HEMOGLOBIN  8.2*  7.9*   HEMATOCRIT  25.3*  24.3*   MCV  88.2  88.4   MCH  28.6  28.7   MCHC  32.4*  32.5*   RDW  46.5  47.0   PLATELETCT  235  274   MPV  10.3  10.3       Active Hospital Problems    Diagnosis   • Fluid collection at surgical site [T88.8XXA]     Priority: High   • Staphylococcus aureus bacteremia [R78.81]   • Fatty liver disease, nonalcoholic [K76.0]   • Dental caries [K02.9]   • Common bile duct dilatation [K83.8]   • Elevated LFTs [R79.89]   • Depression [F32.9]   • Vitamin D deficiency [E55.9]   • Diabetes mellitus type II, controlled (CMS-HCC) [E11.9]   • HTN (hypertension) [I10]   • HLD (hyperlipidemia) [E78.5]   • GERD (gastroesophageal reflux disease) [K21.9]   • Urinary incontinence with continuous leakage [N39.45]       Assessment/Plan:  Patient cleared for DC to SNF by ortho pending medicine clearance  POD#4 S/P:  1.  Irrigation and debridement of left knee with polyethylene liner " exchange.  2.  Irrigation and debridement of left lateral thigh.  3.  Insertion of antibiotic-impregnated calcium phosphate beads to left knee    and left thigh.  Wt bearing status - WBAT  Wound care/Drains - dressing in place until follow up  Future Procedures - none  Lovenox: Start 3/17, Duration-until ambulatory > 150'  Sutures/Staples out- 10-14 days post operatively  PT/OT-initiated  Antibiotics: ancef 2g IV Q8  DVT Prophylaxis- TEDS/SCDs/Foot pumps  Shannon-none  Case Coordination for Discharge Planning - Disposition SNF

## 2017-03-23 NOTE — PROGRESS NOTES
Diabetes education: A 1c not available/ordered. Pt is on Humalog sliding scale with blood sugars : 132, 118 and 144. Pt to be transferred to SNF when medically cleared. Please call [phone] if needs change.

## 2017-03-23 NOTE — PROGRESS NOTES
Pt complained of pain on the right shin. Tramadol administered per MAR. Pt got relief upon reassessment.  Has been voiding on the bedpan, stated that staff is not coming on time when needed to go. Pt has urgency.  Offered to go for a walk before bedtime. Pt refused.  No other concerns noted.

## 2017-03-23 NOTE — PROCEDURES
Patient was brought to cath lab holding.  Consent was obtained. Risks and benefits of procedures were explained.    Anesthesia was used for sedation process.    Complication: none    Diagnosis: No evidence of left atrial appendage thrombus. No evidence of endocarditis.  Deangelo Da Silva MD.  [facility].

## 2017-03-24 LAB
BACTERIA SPEC ANAEROBE CULT: NORMAL
GLUCOSE BLD-MCNC: 132 MG/DL (ref 65–99)
GLUCOSE BLD-MCNC: 135 MG/DL (ref 65–99)
GLUCOSE BLD-MCNC: 154 MG/DL (ref 65–99)
GLUCOSE BLD-MCNC: 171 MG/DL (ref 65–99)
SIGNIFICANT IND 70042: NORMAL
SITE SITE: NORMAL
SOURCE SOURCE: NORMAL

## 2017-03-24 PROCEDURE — 700111 HCHG RX REV CODE 636 W/ 250 OVERRIDE (IP): Performed by: ORTHOPAEDIC SURGERY

## 2017-03-24 PROCEDURE — 700111 HCHG RX REV CODE 636 W/ 250 OVERRIDE (IP): Performed by: INTERNAL MEDICINE

## 2017-03-24 PROCEDURE — 700102 HCHG RX REV CODE 250 W/ 637 OVERRIDE(OP): Performed by: INTERNAL MEDICINE

## 2017-03-24 PROCEDURE — 99232 SBSQ HOSP IP/OBS MODERATE 35: CPT | Performed by: HOSPITALIST

## 2017-03-24 PROCEDURE — 97535 SELF CARE MNGMENT TRAINING: CPT

## 2017-03-24 PROCEDURE — 700102 HCHG RX REV CODE 250 W/ 637 OVERRIDE(OP): Performed by: HOSPITALIST

## 2017-03-24 PROCEDURE — 82962 GLUCOSE BLOOD TEST: CPT | Mod: 91

## 2017-03-24 PROCEDURE — 770006 HCHG ROOM/CARE - MED/SURG/GYN SEMI*

## 2017-03-24 PROCEDURE — A9270 NON-COVERED ITEM OR SERVICE: HCPCS | Performed by: HOSPITALIST

## 2017-03-24 PROCEDURE — A9270 NON-COVERED ITEM OR SERVICE: HCPCS | Performed by: INTERNAL MEDICINE

## 2017-03-24 RX ORDER — CEFAZOLIN SODIUM 2 G/100ML
2 INJECTION, SOLUTION INTRAVENOUS EVERY 8 HOURS
Qty: 100 ML | Status: SHIPPED | DISCHARGE
Start: 2017-03-24 | End: 2017-05-01

## 2017-03-24 RX ORDER — TRAMADOL HYDROCHLORIDE 50 MG/1
50 TABLET ORAL EVERY 6 HOURS PRN
Qty: 30 TAB | Refills: 0 | Status: SHIPPED | DISCHARGE
Start: 2017-03-24 | End: 2017-07-24

## 2017-03-24 RX ORDER — ONDANSETRON 4 MG/1
4 TABLET, ORALLY DISINTEGRATING ORAL EVERY 4 HOURS PRN
Qty: 10 TAB | Refills: 0 | Status: SHIPPED | DISCHARGE
Start: 2017-03-24 | End: 2017-07-24

## 2017-03-24 RX ORDER — FERROUS GLUCONATE 324(38)MG
324 TABLET ORAL 2 TIMES DAILY WITH MEALS
Qty: 30 TAB | Status: SHIPPED | DISCHARGE
Start: 2017-03-24 | End: 2017-07-24

## 2017-03-24 RX ORDER — POTASSIUM CHLORIDE 750 MG/1
30 TABLET, FILM COATED, EXTENDED RELEASE ORAL 2 TIMES DAILY
Qty: 60 TAB | Refills: 3 | Status: SHIPPED | DISCHARGE
Start: 2017-03-24 | End: 2017-07-24

## 2017-03-24 RX ORDER — OXYCODONE HYDROCHLORIDE 5 MG/1
5 TABLET ORAL EVERY 4 HOURS PRN
Qty: 30 TAB | Refills: 0 | Status: SHIPPED | DISCHARGE
Start: 2017-03-24 | End: 2017-03-27

## 2017-03-24 RX ADMIN — CEFAZOLIN SODIUM 2 G: 2 INJECTION, SOLUTION INTRAVENOUS at 06:00

## 2017-03-24 RX ADMIN — INSULIN LISPRO 1 UNITS: 100 INJECTION, SOLUTION INTRAVENOUS; SUBCUTANEOUS at 11:29

## 2017-03-24 RX ADMIN — CYANOCOBALAMIN TAB 500 MCG 1000 MCG: 500 TAB at 08:26

## 2017-03-24 RX ADMIN — SERTRALINE 100 MG: 100 TABLET, FILM COATED ORAL at 08:26

## 2017-03-24 RX ADMIN — FERROUS GLUCONATE 324 MG: 324 TABLET ORAL at 17:42

## 2017-03-24 RX ADMIN — POTASSIUM CHLORIDE 30 MEQ: 750 TABLET, FILM COATED, EXTENDED RELEASE ORAL at 08:26

## 2017-03-24 RX ADMIN — FERROUS GLUCONATE 324 MG: 324 TABLET ORAL at 08:26

## 2017-03-24 RX ADMIN — OXYCODONE HYDROCHLORIDE 5 MG: 5 TABLET ORAL at 08:52

## 2017-03-24 RX ADMIN — POTASSIUM CHLORIDE 30 MEQ: 750 TABLET, FILM COATED, EXTENDED RELEASE ORAL at 20:27

## 2017-03-24 RX ADMIN — TRAMADOL HYDROCHLORIDE 50 MG: 50 TABLET, COATED ORAL at 02:13

## 2017-03-24 RX ADMIN — CEFAZOLIN SODIUM 2 G: 2 INJECTION, SOLUTION INTRAVENOUS at 14:27

## 2017-03-24 RX ADMIN — Medication 2 TABLET: at 08:26

## 2017-03-24 RX ADMIN — OXYCODONE HYDROCHLORIDE 10 MG: 10 TABLET ORAL at 17:44

## 2017-03-24 RX ADMIN — Medication 2 TABLET: at 20:27

## 2017-03-24 RX ADMIN — TRAZODONE HYDROCHLORIDE 150 MG: 150 TABLET ORAL at 20:27

## 2017-03-24 RX ADMIN — LOSARTAN POTASSIUM 50 MG: 50 TABLET, FILM COATED ORAL at 08:26

## 2017-03-24 RX ADMIN — OXYBUTYNIN CHLORIDE 10 MG: 5 TABLET ORAL at 20:27

## 2017-03-24 RX ADMIN — OMEPRAZOLE 20 MG: 20 CAPSULE, DELAYED RELEASE ORAL at 08:26

## 2017-03-24 RX ADMIN — ENOXAPARIN SODIUM 40 MG: 100 INJECTION SUBCUTANEOUS at 08:49

## 2017-03-24 RX ADMIN — CEFAZOLIN SODIUM 2 G: 2 INJECTION, SOLUTION INTRAVENOUS at 20:27

## 2017-03-24 ASSESSMENT — ENCOUNTER SYMPTOMS
SHORTNESS OF BREATH: 0
HEADACHES: 0
ABDOMINAL PAIN: 0
CHILLS: 0
VOMITING: 0
CONSTIPATION: 1
DIARRHEA: 0
COUGH: 0
FEVER: 0
NAUSEA: 0

## 2017-03-24 ASSESSMENT — PAIN SCALES - GENERAL
PAINLEVEL_OUTOF10: 1
PAINLEVEL_OUTOF10: 4

## 2017-03-24 NOTE — CARE PLAN
Problem: Knowledge Deficit  Goal: Knowledge of disease process/condition, treatment plan, diagnostic tests, and medications will improve  Intervention: Assess knowledge level of disease process/condition, treatment plan, diagnostic tests, and medications  Went over POC       Problem: Mobility  Goal: Risk for activity intolerance will decrease  Intervention: Assess and monitor signs of activity intolerance  Pt up ambulating tolerates well.      Problem: Urinary Elimination:  Goal: Ability to reestablish a normal urinary elimination pattern will improve  Voiding qs

## 2017-03-24 NOTE — DISCHARGE SUMMARY
PRIMARY DIAGNOSES:  1.  Acute left hip infection and abscess.  2.  Elevated liver enzymes, now improved.  3.  Benign essential hypertension.  4.  Overactive bladder.  5.  Degenerative arthritis.  6.  Depression.  7.  Type 2 diabetes.  8.  Gastroesophageal reflux.  9.  Hyperlipidemia.  10.  Staphylococcus aureus bacteremia.  11.  Septic arthritis, left knee.  12.  Left thigh abscess.  13.  Iron deficiency anemia.  14- b12 deficiency    BRIEF HOSPITAL COURSE:  This is a 66-year-old female who presented to the   hospital with redness and increased pain over her left hip.  She has had hip   surgery with Dr. Irving 2 months prior.  She was found to have a fluid   collection.  She underwent an arthrocentesis of her left knee and left hip,   irrigation and debridement of the left thigh with removal of hardware from a   left femur, arthrotomy and drainage of her left knee with polyethylene liner   removal and reimplantation and insertion of antibiotic impregnated calcium   phosphate, absorbable beads to the left thigh and knee on 03/16/2017.    She underwent subsequent surgery 03/19/2017 with irrigation and debridement of   an infected total knee arthroplasty and left lateral thigh abscess irrigation   and debridement.  She was found to have staph bacteremia, was seen by   infectious disease by orthopedics as above.  She has done well throughout her   hospital course and will follow up with Dr. Irving on March 27th.  She will   complete a course of antibiotics, specifically IV cefazolin through 04/27/2017   and should be discharged to a skilled nursing facility for further   rehabilitation and physical therapy.    CONSULTATIONS:  1.  Bre Oconnor MD, infectious disease.  2.  Rigo Irving MD, orthopedics.    PROCEDURES:  As described above.    DIET:  Diabetic.    ACTIVITY:  As per physical therapy.    MEDICATIONS ON DISCHARGE:  1.  Cozaar 50 mg a day.  2.  Prilosec 20 mg a day.  3.  Ditropan 10 mg at bedtime.  4.   Percocet 10/325 one to two q. 4 as needed.  5.  Zoloft 100 mg a day.  6.  Trazodone 150 mg at bedtime.  7.  Ancef 2 g every 8 hours through April 27th.  8.  Lovenox 40 mg a day.  9.  Iron gluconate 324 mg 2 times.  10.  Zofran 4 mg q. 4 as needed.  11.  Roxicodone 5 mg every 4 hours as needed.  12.  Potassium chloride 30 mEq 2 times a day.  13.  Tramadol 50 mg every 6 hours as needed.    Time spent on this patient on discharge is 35 minutes.       ____________________________________     ERVIN BERTRAND MD  addendum- no events since this dictation. Cleared for [facility]    CARMEN / DEVEN    DD:  03/24/2017 08:40:17  DT:  03/24/2017 09:04:33    D#:  [id number]  Job#:  [id number]

## 2017-03-24 NOTE — PROGRESS NOTES
Infectious Disease Progress Note    Author: Bre Oconnor M.D. DOS & Time created: 3/24/2017  12:05 PM    Chief Complaint:  Chief Complaint   Patient presents with   • Leg Pain     L   FU for left femoral hardware infection and left septic knee arthritis.  S/p I&D to L knee and thigh, with placement of abx beads to L knee and thigh on 3/19 with Dr. Irving.    Interval History:  3/18 AF, WBC 7.2, states her drains were removed yesterday, is willing to work with PT, pain stable and will return to the OR early next week, Bx - MSSA  3/19 AF, no CBC, plan to return to OR today for repeat I&D and liner exchange  3/20 Tm 99.7, WBC 8.1.  Pain is continuous and severe, 10/10 with movement and 6/10 at rest.  3/21- AF, no CBC.  Pain improved, down to 4/10 to L leg.  Would like to consider home infusion versus SNF.  3/22- Tm 100.4, WBC 5.9.  Left leg pain remains around 4/10.  Now ok going to a SNF, as long as it is in [place].  Tolerating abx without SE.  3/23- Tm 99.5, WBC 6.1.  Left leg pain continues around 4/10.  NPO, awaiting YOMI.  3/24 AF WBC not done YOMI neg Plan for SNF today-no new complaints  Labs Reviewed, Medications Reviewed, Radiology Reviewed and Wound Reviewed.    Review of Systems:  Review of Systems   Constitutional: Negative for fever and chills.   Respiratory: Negative for cough and shortness of breath.    Cardiovascular: Negative for chest pain.   Gastrointestinal: Positive for constipation. Negative for nausea, vomiting, abdominal pain and diarrhea.        Last BM on 3/13   Genitourinary: Negative for dysuria.   Musculoskeletal: Positive for joint pain.        Improving   Skin: Negative for rash.   Neurological: Negative for headaches.       Hemodynamics:  Temp (24hrs), Av.8 °C (98.3 °F), Min:36.4 °C (97.5 °F), Max:37.2 °C (99 °F)  Temperature: 36.4 °C (97.5 °F)  Pulse  Av.7  Min: 53  Max: 97   Blood Pressure : 137/71 mmHg       Physical Exam:  Physical Exam   Constitutional: She is  oriented to person, place, and time. She appears well-developed and well-nourished. No distress.   Obese   HENT:   Head: Normocephalic and atraumatic.   Eyes: EOM are normal. Pupils are equal, round, and reactive to light.   Neck: Normal range of motion. Neck supple.   Cardiovascular: Normal rate, regular rhythm and normal heart sounds.    Pulmonary/Chest: Effort normal and breath sounds normal. No respiratory distress.   Abdominal: Soft. Bowel sounds are normal. She exhibits no distension. There is no tenderness.   Musculoskeletal: She exhibits tenderness.   LLE- rigid original surgical dressing in place.  Toes warm.  R knee surgical scar clean  RUE PICC- non tender, no erythema.   Neurological: She is alert and oriented to person, place, and time.   Skin: Skin is warm and dry. No erythema.   Nursing note and vitals reviewed.      Meds:    Current facility-administered medications:   •  ferrous gluconate (FERGON) tablet 324 mg, 324 mg, Oral, BID WITH MEALS, Luis Hodges M.D., 324 mg at 03/24/17 0826  •  cyanocobalamin (VITAMIN B-12) tablet 1,000 mcg, 1,000 mcg, Oral, DAILY, Luis Hodges M.D., 1,000 mcg at 03/24/17 0826  •  PICC Line Insertion has been implemented, , , Once **AND** May use Lidocaine 1% not to exceed 3 mls for local at insertion site, , , CONTINUOUS **AND** NOTIFY MD, , , Once **AND** Tip to dwell in the superior vena cava, , , CONTINUOUS **AND** Do not use PICC Line until placement verified by Chest X Ray, , , CONTINUOUS **AND** DX-CHEST-FOR PICC LINE Perform procedure in:: PICC Room, , , Once **AND** If radiologist reading of chest X-ray states any of the following the PICC should be used, , , CONTINUOUS **AND** Further evaluation of the PICC placement can be retrieved from X-Ray and Imaging, , , CONTINUOUS **AND** Blood draws through PICC line; draws by RN only, , , CONTINUOUS **AND** FLUSHING GUIDELINES WHEN IN USE, , , CONTINUOUS **AND** normal saline PF 10-20 mL, 10-20 mL, Intravenous,  PRN **AND** FLUSHING GUIDELINES WHEN NOT IN USE, , , CONTINUOUS **AND** DRESSING MAINTENANCE, , , Once **AND** Change needleless pressure ports and IV tubing every 72 hours per hospital policy, , , CONTINUOUS **AND** TUBING, , , CONTINUOUS **AND** If there is an MD order to remove the PICC line, any RN may remove the PICC line, , , CONTINUOUS **AND** [] PATIENT EDUCATION MATERIALS, , , Prior to discharge **AND** NURSING COMMUNICATION, , , CONTINUOUS, JIMMIE Roche  •  potassium chloride ER (KLOR-CON) tablet 30 mEq, 30 mEq, Oral, BID, David Sylvester M.D., 30 mEq at 17 0826  •  tobramycin (NEBCIN) injection 1.2 g, 1.2 g, Other, Intra-Op Once PRN, Rigo Irving M.D.  •  hydrALAZINE (APRESOLINE) injection 10 mg, 10 mg, Intravenous, Q6HRS PRN, David Sylvester M.D., 10 mg at 17 1615  •  insulin lispro (HUMALOG) injection 1-6 Units, 1-6 Units, Subcutaneous, 4X/DAY ACHS, David Sylvester M.D., 1 Units at 17 1129  •  ceFAZolin (ANCEF) IVPB 2 g, 2 g, Intravenous, Q8HRS, Bre Oconnor M.D., 2 g at 17 0600  •  oxycodone immediate-release (ROXICODONE) tablet 5 mg, 5 mg, Oral, Q4HRS PRN, 5 mg at 17 0852 **OR** oxycodone immediate release (ROXICODONE) tablet 10 mg, 10 mg, Oral, Q4HRS PRN, David Sylvester M.D., 10 mg at 17 1820  •  Action is required: Protocol 1073 Hypoglycemia has been implemented, , , Once **AND** Protocol 1073 Inclusion Criteria, , , CONTINUOUS **AND** Protocol 1073 NOTIFY, , , Once **AND** Protocol 1073 Initiate protocol immediately if FSBG is less than or equal to 70 mg/dL, , , CONTINUOUS **AND** glucose 4 g chewable tablet 16 g, 16 g, Oral, Q15 MIN PRN **AND** dextrose 50% (D50W) injection 25 mL, 25 mL, Intravenous, Q15 MIN PRN, David Sylvester M.D.  •  enoxaparin (LOVENOX) inj 40 mg, 40 mg, Subcutaneous, DAILY, Rigo Irving M.D., 40 mg at 17 0849  •  losartan (COZAAR) tablet 50 mg, 50 mg, Oral, DAILY, Codi Singh,  M.D., 50 mg at 03/24/17 0826  •  omeprazole (PRILOSEC) capsule 20 mg, 20 mg, Oral, DAILY, Codi Singh M.D., 20 mg at 03/24/17 0826  •  oxybutynin (DITROPAN) tablet 10 mg, 10 mg, Oral, QHS, Codi Singh M.D., 10 mg at 03/23/17 2130  •  sertraline (ZOLOFT) tablet 100 mg, 100 mg, Oral, DAILY, Codi Singh M.D., 100 mg at 03/24/17 0826  •  trazodone (DESYREL) tablet 150 mg, 150 mg, Oral, QHS PRN, Codi Singh M.D., 150 mg at 03/23/17 2130  •  tramadol (ULTRAM) 50 MG tablet 50 mg, 50 mg, Oral, Q6HRS PRN, Codi Singh M.D., 50 mg at 03/24/17 0213  •  senna-docusate (PERICOLACE or SENOKOT S) 8.6-50 MG per tablet 2 Tab, 2 Tab, Oral, BID, 2 Tab at 03/24/17 0826 **AND** polyethylene glycol/lytes (MIRALAX) PACKET 1 Packet, 1 Packet, Oral, QDAY PRN, 1 Packet at 03/22/17 2040 **AND** magnesium hydroxide (MILK OF MAGNESIA) suspension 30 mL, 30 mL, Oral, QDAY PRN, 30 mL at 03/21/17 2133 **AND** bisacodyl (DULCOLAX) suppository 10 mg, 10 mg, Rectal, QDAY PRN, Codi Singh M.D., 10 mg at 03/23/17 0606  •  ondansetron (ZOFRAN) syringe/vial injection 4 mg, 4 mg, Intravenous, Q4HRS PRN, Codi Singh M.D.  •  ondansetron (ZOFRAN ODT) dispertab 4 mg, 4 mg, Oral, Q4HRS PRN, Codi Singh M.D.  •  morphine (pf) 4 mg/ml injection 2-4 mg, 2-4 mg, Intravenous, Q2HRS PRN, Codi Singh M.D., 4 mg at 03/17/17 0849  •  NS infusion 2,586 mL, 30 mL/kg, Intravenous, Once PRN, Codi Singh M.D.  •  NS (BOLUS) infusion 500 mL, 500 mL, Intravenous, Once PRN, Codi Singh M.D., Stopped at 03/17/17 0846    Labs:  Recent Labs      03/22/17   0330  03/23/17   0330   WBC  5.9  6.1   RBC  2.87*  2.75*   HEMOGLOBIN  8.2*  7.9*   HEMATOCRIT  25.3*  24.3*   MCV  88.2  88.4   MCH  28.6  28.7   RDW  46.5  47.0   PLATELETCT  235  274   MPV  10.3  10.3   NEUTSPOLYS  64.50  83.50*   LYMPHOCYTES  16.70*  9.50*   MONOCYTES  9.70  2.60   EOSINOPHILS  3.60  3.50   BASOPHILS  0.20  0.00   RBCMORPHOLO  Normal  Present     Recent Labs       03/21/17   1724  03/22/17   0330  03/23/17   0330   SODIUM  133*  137  134*   POTASSIUM  4.0  3.9  4.0   CHLORIDE  96  99  99   CO2  27  29  28   GLUCOSE  153*  137*  137*   BUN  9  7*  7*     Recent Labs      03/21/17   1724  03/22/17   0330  03/23/17   0330   ALBUMIN   --   2.9*  3.0*   TBILIRUBIN   --   0.6  0.6   ALKPHOSPHAT   --   82  80   TOTPROTEIN   --   5.9*  5.9*   ALTSGPT   --   5  5   ASTSGOT   --   15  14   CREATININE  0.88  0.82  0.78       Imaging:  Ct-extremity, Lower With Left    3/16/2017  3/16/2017 1:18 AM HISTORY/REASON FOR EXAM:  Atraumatic Pain/Swelling/Deformity. Left lateral thigh fluid collection Preoperative evaluation TECHNIQUE/EXAM DESCRIPTION AND NUMBER OF VIEWS:  CT scan of the LEFT lower extremity with contrast, with reconstructions. Thin helical 3 mm sections were obtained from the distal femur through the proximal tibia/fibula. Sagittal and coronal multiplanar reconstructions were generated from the axial images. A total of 100 mL of Omnipaque 350 nonionic contrast was administered  IV without complication. Up to date radiation dose reduction adjustments have been utilized to meet ALARA standards for radiation dose reduction. COMPARISON: Left lower extremity ultrasound 3/15/2017 FINDINGS: There is a left hip arthroplasty present There is left femoral hardware present. There is a left knee arthroplasty. There is a fluid collection in the left lateral thigh. It is significantly obscured by artifact from the hardware. It is probably better assessed on the ultrasound 3/16/2017. There does however appear to be a mid/distal thigh fluid collection which by CT measures approximately 15 cm in length. Location is probably subcutaneous.     3/16/2017  1.  Limited assessment of left lateral thigh fluid collection due to artifact secondary to hardware 2.  Fluid collection is at least 15 cm in length and probably subcutaneous in location 3.  Left hip arthroplasty, left knee arthroplasty, left  femoral hardware present    Us-liver And Biliary Tree    3/16/2017  3/16/2017 4:25 AM HISTORY/REASON FOR EXAM:  Abnormal liver function test Abdominal pain TECHNIQUE/EXAM DESCRIPTION AND NUMBER OF VIEWS:  Real-time sonography of the liver and biliary tree. COMPARISON: Abdominal ultrasound 6/16/2016 FINDINGS: The liver is normal in contour with increased echogenicity. There is no evidence of solid mass lesion. The liver measures 15.7 cm. The gallbladder is absent. The common duct measures 1.5 cm. The visualized pancreas is unremarkable. There is aortic atherosclerosis. Intrahepatic IVC is patent. The portal vein is patent with hepatopetal flow. The right kidney measures 8.1 cm. There is no ascites.     3/16/2017  1.  Prior cholecystectomy 2.  Moderate biliary dilation with common bile duct measuring 1.5 cm 3.  Fatty infiltration of the liver     Dx-portable Fluoroscopy < 1 Hour    3/16/2017  3/16/2017 6:13 PM HISTORY/REASON FOR EXAM:  Left hip irrigation and debridement. Left lateral thigh fluid collection. Left hip arthroplasty. TECHNIQUE/EXAM DESCRIPTION AND NUMBER OF VIEWS:  1 views of the LEFT hip. COMPARISON: CT scan 3/16/2017 FINDINGS: Single fluoroscopic image demonstrates a needle projecting over the left hip arthroplasty laterally. Fluoroscopy time of 6 seconds  was utilized by THIEN CORREA for this procedure.     3/16/2017  Intraoperative fluoroscopic spot images as described above.    Le Venous Duplex (specify In Comments Left, Right Or Bilateral)    3/16/2017   Vascular Laboratory  CONCLUSIONS  1.  Normal left lower extremity superficial and deep venous examination.  2.  In the left lateral thigh deep to the incision  There is a large complex fluid collection measuring 17.3 cm in length and  5.9 x 3.5 cm transversely. Differential includes postoperative seroma,  hematoma, or infected fluid.  MARCO A ROGERSDANIEL FRIED  Exam Date:     03/15/2017 22:52  Room #:     Inpatient  Priority:     Stat  Ht (in):              Wt (lb):  Ordering Physician:        ANIBAL VAUGHN  Referring Physician:       [id number]  Sonographer:               Mayur Manuel RDMS, RVT  Study Type:                Complete Unilateral  Technical Quality:         Adequate  Age:    66    Gender:     F  MRN:    [id number]  :    1950      BSA:  Indications:     Pain in left leg  CPT Codes:       63053  ICD Codes:       [id number]  History:         Pain and swelling of the left thigh  Limitations:  PROCEDURES:  Left lower extremity venous duplex imaging.  The following venous structures were evaluated: common femoral, profunda  femoral, greater saphenous, femoral, popliteal , peroneal and posterior  tibial veins.  Serial compression, augmentation maneuvers,  color and spectral Doppler  flow evaluations were performed.  FINDINGS:  Left lower extremity -.  Complete color filling and compressibility with normal venous flow dynamics  including spontaneous flow, response to augmentation maneuvers, and  respiratory phasicity.  No superficial or deep venous thrombosis.  The peroneal and posterior tibial veins are difficult to assess for  compressibility, but flow response to augmentation is demonstrated.  Lateral left thigh at the area of concern: 17.3x5.9x3.5cm complex fluid  collection. This area is at a previous incision site.  Flow was evaluated in the contralateral common femoral vein and normal  venous flow dynamics including spontaneous flow, respiratory phasic  variation and augmentation were demonstrated.  Zander Cruz  (Electronically Signed)  Final Date:      2017                   00:09    Dx-hip-unilateral-without Pelvis-1 View Left    3/16/2017  3/16/2017 6:13 PM HISTORY/REASON FOR EXAM:  Left hip irrigation and debridement. Left lateral thigh fluid collection. Left hip arthroplasty. TECHNIQUE/EXAM DESCRIPTION AND NUMBER OF VIEWS:  1 views of the LEFT hip. COMPARISON: CT scan 3/16/2017 FINDINGS:  Single fluoroscopic image demonstrates a needle projecting over the left hip arthroplasty laterally. Fluoroscopy time of 6 seconds  was utilized by THIEN CORREA for this procedure.     3/16/2017  Intraoperative fluoroscopic spot images as described above.      Micro:  BLOOD CULTURE   Date Value Ref Range Status   03/17/2017 No growth after 5 days of incubation.  Final        Assessment:  Active Hospital Problems    Diagnosis   • *Fluid collection at surgical site [T88.8XXA]   • Staphylococcus aureus bacteremia [R78.81]   • Common bile duct dilatation [K83.8]   • Diabetes mellitus type II, controlled (CMS-[facility]) [E11.9]   • Urinary incontinence with continuous leakage [N39.45]       Plan:  Left femur hardware infection with abscess  S/p removal 03/16/17-OR Cx - MSSA  S/p repeat I&D with liner exchange on 3/19 with abx bead placement  OR cx 3/19- MSSA  Continue IV Cefazolin. Anticipate 6 week course from 3/16/17, end date 4/27/17.    Left septic knee arthritis  S/p arthrotomy and drainage of knee with liner exchange 3/16  S/p repeat I&D as above.  Abx per above    MSSA bacteremia  3/16 Bcx - positive   3/17 Bcx - NGTD  Abx per above  YOMI negative    DM2  Optimize BS control to assist with wound healing, DM can worsen infection.    ANTONIA IM-for transfer to SNF today

## 2017-03-24 NOTE — DOCUMENTATION QUERY
DOCUMENTATION QUERY    PROVIDERS: Please select “Cosign w/ note”to reply to query.    To better represent the severity of illness of your patient, please review the following information and exercise your independent professional judgment in responding to this query.     H&H of 12.9 & 37.7;  9.3 & 27.8; 7.9 & is noted in the Lab Results . It is documented iron deficiency anemia.  Based upon the clinical findings, risk factors, and treatment, can this diagnosis be further specified?    • Acute blood loss anemia  • Chronic blood loss anemia  • Iron deficiency anemia due to _____________________  • Unable to determine      The medical record reflects the following:   Clinical Findings  per labwork- H&H of 12.9 & 37.7;  9.3 & 27.8; 7.9 & 24.3;  EBL on Surgery #1- 400cc; EBL on Surgery #2- 100 cc   Treatment Repeated H&H's; iron studies; iron replacement   Risk Factors S/P surgeries x 2    Location within medical record  History and Physical, Progress Notes and Lab Results      Thank you,  Geno England RN, CCDS   Sr. Clinical    ph- [phone]

## 2017-03-24 NOTE — DISCHARGE PLANNING
Medical Social Work    SW met with pt at bedside. Pt is agreeable to going to [facility] today.

## 2017-03-24 NOTE — CARE PLAN
Problem: Safety  Goal: Will remain free from falls  Provided assistance with ambulation. Fall prevention measures in place. Hourly rounds ongoing.    Problem: Venous Thromboembolism (VTW)/Deep Vein Thrombosis (DVT) Prevention:  Goal: Patient will participate in Venous Thrombosis (VTE)/Deep Vein Thrombosis (DVT)Prevention Measures  educated on the use of SCDs and importance of mobility for DVT prevention.

## 2017-03-24 NOTE — DISCHARGE PLANNING
Medical Social Work    SW called [facility] at [phone]. Representative reported that they had declined the pt due to pt needing IV abx three times a day.

## 2017-03-24 NOTE — DISCHARGE PLANNING
Medical Social Work    Per [facility], [facility] does not have any beds available today.     SW called [facility] at [phone]. Representative reported that the admissions director was out of the office and would not be back until Monday.

## 2017-03-24 NOTE — DISCHARGE PLANNING
Medical Social Work    SW informed pt that pt would not be transferring to SNF today.     Plan: Transfer to SNF when bed is available at [facility].

## 2017-03-24 NOTE — PROGRESS NOTES
Pt went for YOMI this am and it was unremarkable  Work with PT and is doing well getting oob with walker  Cont iv abx per order  Pain well controlled today  Pt has not had bm but continues to pass flatus

## 2017-03-25 LAB
AMOBARBITAL SERPL-MCNC: <50 NG/ML
BUTALBITAL SERPL-MCNC: <50 NG/ML
GLUCOSE BLD-MCNC: 122 MG/DL (ref 65–99)
GLUCOSE BLD-MCNC: 133 MG/DL (ref 65–99)
GLUCOSE BLD-MCNC: 167 MG/DL (ref 65–99)
PENTOBARB SERPL-MCNC: <50 NG/ML
PHENOBARB SERPL-MCNC: <50 NG/ML
SECOBARBITAL SERPL-MCNC: <50 NG/ML

## 2017-03-25 PROCEDURE — A9270 NON-COVERED ITEM OR SERVICE: HCPCS | Performed by: INTERNAL MEDICINE

## 2017-03-25 PROCEDURE — 82962 GLUCOSE BLOOD TEST: CPT

## 2017-03-25 PROCEDURE — A9270 NON-COVERED ITEM OR SERVICE: HCPCS | Performed by: HOSPITALIST

## 2017-03-25 PROCEDURE — 700102 HCHG RX REV CODE 250 W/ 637 OVERRIDE(OP): Performed by: HOSPITALIST

## 2017-03-25 PROCEDURE — 700111 HCHG RX REV CODE 636 W/ 250 OVERRIDE (IP): Performed by: ORTHOPAEDIC SURGERY

## 2017-03-25 PROCEDURE — 700111 HCHG RX REV CODE 636 W/ 250 OVERRIDE (IP): Performed by: INTERNAL MEDICINE

## 2017-03-25 PROCEDURE — 700102 HCHG RX REV CODE 250 W/ 637 OVERRIDE(OP): Performed by: INTERNAL MEDICINE

## 2017-03-25 PROCEDURE — 99232 SBSQ HOSP IP/OBS MODERATE 35: CPT | Performed by: HOSPITALIST

## 2017-03-25 PROCEDURE — 770006 HCHG ROOM/CARE - MED/SURG/GYN SEMI*

## 2017-03-25 RX ORDER — ALBUTEROL SULFATE 2.5 MG/3ML
SOLUTION RESPIRATORY (INHALATION)
Status: ACTIVE
Start: 2017-03-25 | End: 2017-03-26

## 2017-03-25 RX ADMIN — OXYCODONE HYDROCHLORIDE 10 MG: 10 TABLET ORAL at 19:36

## 2017-03-25 RX ADMIN — CEFAZOLIN SODIUM 2 G: 2 INJECTION, SOLUTION INTRAVENOUS at 21:11

## 2017-03-25 RX ADMIN — TRAZODONE HYDROCHLORIDE 150 MG: 150 TABLET ORAL at 21:10

## 2017-03-25 RX ADMIN — SERTRALINE 100 MG: 100 TABLET, FILM COATED ORAL at 08:13

## 2017-03-25 RX ADMIN — OMEPRAZOLE 20 MG: 20 CAPSULE, DELAYED RELEASE ORAL at 08:14

## 2017-03-25 RX ADMIN — CEFAZOLIN SODIUM 2 G: 2 INJECTION, SOLUTION INTRAVENOUS at 05:53

## 2017-03-25 RX ADMIN — OXYCODONE HYDROCHLORIDE 10 MG: 10 TABLET ORAL at 08:14

## 2017-03-25 RX ADMIN — ENOXAPARIN SODIUM 40 MG: 100 INJECTION SUBCUTANEOUS at 08:16

## 2017-03-25 RX ADMIN — POTASSIUM CHLORIDE 30 MEQ: 750 TABLET, FILM COATED, EXTENDED RELEASE ORAL at 19:36

## 2017-03-25 RX ADMIN — Medication 2 TABLET: at 19:37

## 2017-03-25 RX ADMIN — FERROUS GLUCONATE 324 MG: 324 TABLET ORAL at 08:14

## 2017-03-25 RX ADMIN — INSULIN LISPRO 1 UNITS: 100 INJECTION, SOLUTION INTRAVENOUS; SUBCUTANEOUS at 21:04

## 2017-03-25 RX ADMIN — FERROUS GLUCONATE 324 MG: 324 TABLET ORAL at 18:06

## 2017-03-25 RX ADMIN — POTASSIUM CHLORIDE 30 MEQ: 750 TABLET, FILM COATED, EXTENDED RELEASE ORAL at 08:14

## 2017-03-25 RX ADMIN — CYANOCOBALAMIN TAB 500 MCG 1000 MCG: 500 TAB at 08:13

## 2017-03-25 RX ADMIN — Medication 2 TABLET: at 08:14

## 2017-03-25 RX ADMIN — LOSARTAN POTASSIUM 50 MG: 50 TABLET, FILM COATED ORAL at 08:14

## 2017-03-25 RX ADMIN — CEFAZOLIN SODIUM 2 G: 2 INJECTION, SOLUTION INTRAVENOUS at 15:39

## 2017-03-25 RX ADMIN — OXYBUTYNIN CHLORIDE 10 MG: 5 TABLET ORAL at 19:36

## 2017-03-25 ASSESSMENT — ENCOUNTER SYMPTOMS
FEVER: 0
BLURRED VISION: 0
SORE THROAT: 0
HEADACHES: 0
CHILLS: 0
NECK PAIN: 0
TINGLING: 0
ABDOMINAL PAIN: 0
COUGH: 0
DEPRESSION: 0
DIZZINESS: 0
EYE PAIN: 0
INSOMNIA: 0
PALPITATIONS: 0
DIARRHEA: 0
NAUSEA: 0
VOMITING: 0
SHORTNESS OF BREATH: 0
BACK PAIN: 0

## 2017-03-25 ASSESSMENT — PAIN SCALES - GENERAL
PAINLEVEL_OUTOF10: 5
PAINLEVEL_OUTOF10: 4

## 2017-03-25 NOTE — DISCHARGE PLANNING
Per LAINEY Monroe request, spoke with Amanda at [facility], they do not accept weekend admits.  Per Amanda, we will need check for bed availability on Monday.  SIA Monroe advised.

## 2017-03-25 NOTE — PROGRESS NOTES
Hospital Medicine Progress Note, Adult, Complex               Author: Luis Hodges Date & Time created: 3/25/2017  8:36 AM     id- 65 y/o female LLE fluid colleciton, s/p arthrotomy, hardware removal and left knee hardware removal with liner exchange. Cx positive for MSSA.       Interval History:      awaiting acceptence for snf. tolerating abx. no other events.       Review of Systems:  Review of Systems   Constitutional: Negative for fever and chills.   HENT: Negative for sore throat.    Eyes: Negative for blurred vision and pain.   Respiratory: Negative for shortness of breath.    Cardiovascular: Negative for palpitations.   Gastrointestinal: Negative for nausea and abdominal pain.   Genitourinary: Negative for dysuria and urgency.   Musculoskeletal: Negative for back pain and neck pain.   Skin: Negative for itching and rash.   Neurological: Negative for dizziness, tingling and headaches.   Psychiatric/Behavioral: Negative for depression. The patient does not have insomnia.    All other systems reviewed and are negative.      Physical Exam:  Physical Exam   Constitutional: She is oriented to person, place, and time. She appears well-developed and well-nourished. No distress.   HENT:   Right Ear: External ear normal.   Left Ear: External ear normal.   Nose: Nose normal.   Mouth/Throat: No oropharyngeal exudate.   Eyes: Right eye exhibits no discharge. Left eye exhibits no discharge. No scleral icterus.   Neck: Normal range of motion. Neck supple. No JVD present.   Cardiovascular: Normal rate, regular rhythm, normal heart sounds and intact distal pulses.    No murmur heard.  Pulmonary/Chest: Effort normal and breath sounds normal. No stridor. No respiratory distress. She has no rales.   Abdominal: Soft. Bowel sounds are normal. She exhibits no distension. There is no tenderness.   Musculoskeletal: Normal range of motion. She exhibits edema and tenderness.   Multiple surgical scars, bandages in place    Neurological: She is alert and oriented to person, place, and time. No cranial nerve deficit.   Skin: Skin is warm. No rash noted. She is diaphoretic. No erythema.   Psychiatric: She has a normal mood and affect. Her behavior is normal.   Nursing note and vitals reviewed.      Labs:        Invalid input(s): [id number]      Recent Labs      17   0330   SODIUM  134*   POTASSIUM  4.0   CHLORIDE  99   CO2  28   BUN  7*   CREATININE  0.78   MAGNESIUM  2.2   CALCIUM  9.7     Recent Labs      17   033   ALTSGPT  5   ASTSGOT  14   ALKPHOSPHAT  80   TBILIRUBIN  0.6   GLUCOSE  137*     Recent Labs      17   0330   RBC  2.75*   HEMOGLOBIN  7.9*   HEMATOCRIT  24.3*   PLATELETCT  274   IRON  29*   TOTIRONBC  265     Recent Labs      17   0330   WBC  6.1   NEUTSPOLYS  83.50*   LYMPHOCYTES  9.50*   MONOCYTES  2.60   EOSINOPHILS  3.50   BASOPHILS  0.00   ASTSGOT  14   ALTSGPT  5   ALKPHOSPHAT  80   TBILIRUBIN  0.6           Hemodynamics:  Temp (24hrs), Av.8 °C (98.2 °F), Min:36.3 °C (97.3 °F), Max:37.4 °C (99.4 °F)  Temperature: 37.4 °C (99.4 °F)  Pulse  Av.8  Min: 53  Max: 97   Blood Pressure : 126/73 mmHg     Respiratory:    Respiration: 17, Pulse Oximetry: 93 %           Fluids:  No intake or output data in the 24 hours ending 17 0836     GI/Nutrition:  Orders Placed This Encounter   Procedures   • DIET ORDER     Standing Status: Standing      Number of Occurrences: 1      Standing Expiration Date:      Order Specific Question:  Diet:     Answer:  Diabetic [3]     Order Specific Question:  Diet:     Answer:  Low Fiber(GI Soft) [2]     Medical Decision Making, by Problem:  Active Hospital Problems    Diagnosis   • *Fluid collection at surgical site [T88.8XXA]-c/f infection, doing well  -s/p hardware removal and arthrotomy and drainage of the left knee with liner exchange and I&D  -mssa in cx. abx per ID ongoing. YOMI pending for today.    • Fatty liver disease, nonalcoholic [K76.0]   •  Dental caries [K02.9]-recent teeth extraction, zoya pending as above.    • Common bile duct dilatation [K83.8]-consistent with previous cholecystectomy.    • Elevated LFTs [R79.89]-resolved. Normal workup.    • Depression [F32.9]-outpatient treatment, zoloft   • Vitamin D deficiency [E55.9]-replacement   • Diabetes mellitus type II, controlled (CMS-HCC) [E11.9]-ISS, adjust PRN   • HTN (hypertension) [I10]-well controlled, continue current meds   • HLD (hyperlipidemia) [E78.5]-statin   • GERD (gastroesophageal reflux disease) [K21.9]-PPI   • Urinary incontinence with continuous leakage [N39.45]-ditropan     Staph bacteremia-confirmed MSSA-abx per ID, recent dental work as above. ZOYA pending.   Hypokalemia-replace prn      Anemia- fe studies low. Start replacement.   Mag - repleated  Low b12- start replacement.     Discussed plan with RN  dispo- cleared to go to snf when bed available.     Labs reviewed, Medications reviewed and EKG reviewed  Shannon catheter: No Shannon        DVT prophylaxis - mechanical: SCDs      Assessed for rehab: Patient was assess for and/or received rehabilitation services during this hospitalization

## 2017-03-25 NOTE — CARE PLAN
Problem: Safety  Goal: Will remain free from falls  Provided assistance with ambulation. Fall prevention measures in place. Hourly rounds ongoing.    Problem: Pain Management  Goal: Pain level will decrease to patient’s comfort goal  Pain medications given per MAR. Other non-pharmacologic measures for pain initiated.

## 2017-03-25 NOTE — THERAPY
"Occupational Therapy Treatment completed with focus on ADLs, ADL transfers and patient education.  Functional Status:  Pt was seen for Occupational Therapy treatment today, see Therapy Kardex for details. Reviewed WB precautions and fall prevention techs with pt prior to activity. Reviewed no knee flexion in bed or with any activity. Pt required SBA for bed mobility and for supine to sit at EOB. Pt able to doff socks and don pants with reacher seated base. CGA for clothing management up over hips in a standing position . Pt demonstrated SBA for UB dressing, SBA/CGA for LB dressing. Pt also demonstrated CGA for Ambulating ADL's with FWW. Pt demo supervision for toilet transfers and set up/SBA for full toilet hygiene. Pt used grab bars for sit to stand from toilet with FWW for dynamic standing balance support.  Pt stood at sink for oral hygiene ad grooming, renetta care with set up and occasional CGA as needed from fatigue.Pt ambulated BTB with CGA using FWW and doffed all clothing seated base with SBA. Pt was assisted BTB with SBA, call light in reach and nursing is aware. Pt will need to be more Indep to return home. Pt is 's caregiver per pt. RN/SW informed.   Continue Occupational Therapy services as per plan.    Plan of Care: Will benefit from Occupational Therapy 2 times per week  Discharge Recommendations:  Equipment Front-Wheel Walker, Shower Chair and Will Continue to Assess for Equipment Needs. Post-acute therapy Discharge to a transitional care facility for continued skilled therapy services.    See \"Rehab Therapy-Acute\" Patient Summary Report for complete documentation.   "

## 2017-03-25 NOTE — PROGRESS NOTES
"   Orthopaedic Progress Note    Interval changes:  Cleared for SNF DC pending acceptance    ROS - Patient denies any new issues.  Pain well controlled.    Blood pressure 105/52, pulse 76, temperature 36.5 °C (97.7 °F), temperature source Temporal, resp. rate 16, height 1.702 m (5' 7\"), weight 86.183 kg (190 lb), last menstrual period 10/01/1990, SpO2 96 %, not currently breastfeeding.      Patient seen and examined  No acute distress  Breathing non labored  RRR  LLE surgical dressing/ splint is clean, dry, and intact. Patient clearly fires tibialis anterior, EHL, and gastrocnemius/soleus. Sensation is intact to light touch throughout superficial peroneal, deep peroneal, tibial, saphenous, and sural nerve distributions. Strong and palpable 2+ dorsalis pedis and posterior tibial pulses with capillary refill less than 2 seconds. No lower leg tenderness or discomfort.      Recent Labs      03/23/17   0330   WBC  6.1   RBC  2.75*   HEMOGLOBIN  7.9*   HEMATOCRIT  24.3*   MCV  88.4   MCH  28.7   MCHC  32.5*   RDW  47.0   PLATELETCT  274   MPV  10.3       Active Hospital Problems    Diagnosis   • Fluid collection at surgical site [T88.8XXA]     Priority: High   • Staphylococcus aureus bacteremia [R78.81]   • Fatty liver disease, nonalcoholic [K76.0]   • Dental caries [K02.9]   • Elevated LFTs [R79.89]   • Depression [F32.9]   • Vitamin D deficiency [E55.9]   • Diabetes mellitus type II, controlled (CMS-HCC) [E11.9]   • HTN (hypertension) [I10]   • HLD (hyperlipidemia) [E78.5]   • GERD (gastroesophageal reflux disease) [K21.9]   • Urinary incontinence with continuous leakage [N39.45]       Assessment/Plan:  Patient cleared for DC to SNF by ortho pending medicine clearance  POD#6 S/P:  1.  Irrigation and debridement of left knee with polyethylene liner exchange.  2.  Irrigation and debridement of left lateral thigh.  3.  Insertion of antibiotic-impregnated calcium phosphate beads to left knee    and left thigh.  Wt bearing " status - WBAT  Wound care/Drains - dressing in place until follow up  Future Procedures - none  Lovenox: Start 3/17, Duration-until ambulatory > 150'  Sutures/Staples out- 10-14 days post operatively  PT/OT-initiated  Antibiotics: ancef 2g IV Q8  DVT Prophylaxis- TEDS/SCDs/Foot pumps  Shannon-none  Case Coordination for Discharge Planning - Disposition SNF

## 2017-03-25 NOTE — PROGRESS NOTES
Infectious Disease Progress Note    Author: Bre Oconnor M.D. DOS & Time created: 3/25/2017  3:57 PM    Chief Complaint:  Chief Complaint   Patient presents with   • Leg Pain     L   FU for left femoral hardware infection and left septic knee arthritis.  S/p I&D to L knee and thigh, with placement of abx beads to L knee and thigh on 3/19 with Dr. Irving.    Interval History:  3/18 AF, WBC 7.2, states her drains were removed yesterday, is willing to work with PT, pain stable and will return to the OR early next week, Bx - MSSA  3/19 AF, no CBC, plan to return to OR today for repeat I&D and liner exchange  3/20 Tm 99.7, WBC 8.1.  Pain is continuous and severe, 10/10 with movement and 6/10 at rest.  3/21- AF, no CBC.  Pain improved, down to 4/10 to L leg.  Would like to consider home infusion versus SNF.  3/22- Tm 100.4, WBC 5.9.  Left leg pain remains around 4/10.  Now ok going to a SNF, as long as it is in [place].  Tolerating abx without SE.  3/23- Tm 99.5, WBC 6.1.  Left leg pain continues around 4/10.  NPO, awaiting YOMI.  3/24 AF WBC not done YOMI neg Plan for SNF today-no new complaints  3/25 AF -difficulties with transfer-tolerating abx  Labs Reviewed, Medications Reviewed, Radiology Reviewed and Wound Reviewed.    Review of Systems:  Review of Systems   Constitutional: Negative for fever and chills.   Respiratory: Negative for cough and shortness of breath.    Cardiovascular: Negative for chest pain.   Gastrointestinal: Negative for nausea, vomiting, abdominal pain and diarrhea.   Genitourinary: Negative for dysuria.   Musculoskeletal: Positive for joint pain.        Improving   Skin: Negative for rash.   Neurological: Negative for headaches.       Hemodynamics:  Temp (24hrs), Av.8 °C (98.2 °F), Min:36.3 °C (97.3 °F), Max:37.4 °C (99.4 °F)  Temperature:  (no vitials needed by nurse)  Pulse  Av.8  Min: 53  Max: 97   Blood Pressure : 105/52 mmHg       Physical Exam:  Physical Exam    Constitutional: She is oriented to person, place, and time. She appears well-developed and well-nourished. No distress.   Obese   HENT:   Head: Normocephalic and atraumatic.   Eyes: EOM are normal. Pupils are equal, round, and reactive to light.   Neck: Normal range of motion. Neck supple.   Cardiovascular: Normal rate, regular rhythm and normal heart sounds.    Pulmonary/Chest: Effort normal and breath sounds normal. No respiratory distress.   Abdominal: Soft. Bowel sounds are normal. She exhibits no distension. There is no tenderness.   Musculoskeletal: She exhibits tenderness.   LLE- rigid original surgical dressing in place.  Toes warm.  R knee surgical scar clean  RUE PICC- non tender, no erythema.   Neurological: She is alert and oriented to person, place, and time.   Skin: Skin is warm and dry. No erythema.   Nursing note and vitals reviewed.      Meds:    Current facility-administered medications:   •  ferrous gluconate (FERGON) tablet 324 mg, 324 mg, Oral, BID WITH MEALS, Luis Hodges M.D., 324 mg at 03/25/17 0814  •  cyanocobalamin (VITAMIN B-12) tablet 1,000 mcg, 1,000 mcg, Oral, DAILY, Luis Hodges M.D., 1,000 mcg at 03/25/17 0813  •  PICC Line Insertion has been implemented, , , Once **AND** May use Lidocaine 1% not to exceed 3 mls for local at insertion site, , , CONTINUOUS **AND** NOTIFY MD, , , Once **AND** Tip to dwell in the superior vena cava, , , CONTINUOUS **AND** Do not use PICC Line until placement verified by Chest X Ray, , , CONTINUOUS **AND** DX-CHEST-FOR PICC LINE Perform procedure in:: PICC Room, , , Once **AND** If radiologist reading of chest X-ray states any of the following the PICC should be used, , , CONTINUOUS **AND** Further evaluation of the PICC placement can be retrieved from X-Ray and Imaging, , , CONTINUOUS **AND** Blood draws through PICC line; draws by RN only, , , CONTINUOUS **AND** FLUSHING GUIDELINES WHEN IN USE, , , CONTINUOUS **AND** normal saline PF 10-20  mL, 10-20 mL, Intravenous, PRN **AND** FLUSHING GUIDELINES WHEN NOT IN USE, , , CONTINUOUS **AND** DRESSING MAINTENANCE, , , Once **AND** Change needleless pressure ports and IV tubing every 72 hours per hospital policy, , , CONTINUOUS **AND** TUBING, , , CONTINUOUS **AND** If there is an MD order to remove the PICC line, any RN may remove the PICC line, , , CONTINUOUS **AND** [] PATIENT EDUCATION MATERIALS, , , Prior to discharge **AND** NURSING COMMUNICATION, , , CONTINUOUS, MOHINDER Roche.  •  potassium chloride ER (KLOR-CON) tablet 30 mEq, 30 mEq, Oral, BID, David Sylvester M.D., 30 mEq at 17 0814  •  tobramycin (NEBCIN) injection 1.2 g, 1.2 g, Other, Intra-Op Once PRN, Rigo Irving M.D.  •  hydrALAZINE (APRESOLINE) injection 10 mg, 10 mg, Intravenous, Q6HRS PRN, David Sylvester M.D., 10 mg at 17 1615  •  insulin lispro (HUMALOG) injection 1-6 Units, 1-6 Units, Subcutaneous, 4X/DAY ACHS, David Sylvester M.D., Stopped at 17 1700  •  ceFAZolin (ANCEF) IVPB 2 g, 2 g, Intravenous, Q8HRS, Bre Oconnor M.D., 2 g at 17 1539  •  oxycodone immediate-release (ROXICODONE) tablet 5 mg, 5 mg, Oral, Q4HRS PRN, 5 mg at 17 0852 **OR** oxycodone immediate release (ROXICODONE) tablet 10 mg, 10 mg, Oral, Q4HRS PRN, David Sylvester M.D., 10 mg at 17 0814  •  Action is required: Protocol 1073 Hypoglycemia has been implemented, , , Once **AND** Protocol 1073 Inclusion Criteria, , , CONTINUOUS **AND** Protocol 1073 NOTIFY, , , Once **AND** Protocol 1073 Initiate protocol immediately if FSBG is less than or equal to 70 mg/dL, , , CONTINUOUS **AND** glucose 4 g chewable tablet 16 g, 16 g, Oral, Q15 MIN PRN **AND** dextrose 50% (D50W) injection 25 mL, 25 mL, Intravenous, Q15 MIN PRN, David Sylvester M.D.  •  enoxaparin (LOVENOX) inj 40 mg, 40 mg, Subcutaneous, DAILY, Rigo Irving M.D., 40 mg at 17 0816  •  losartan (COZAAR) tablet 50 mg, 50 mg, Oral,  DAILY, Codi Singh M.D., 50 mg at 03/25/17 0814  •  omeprazole (PRILOSEC) capsule 20 mg, 20 mg, Oral, DAILY, Codi Singh M.D., 20 mg at 03/25/17 0814  •  oxybutynin (DITROPAN) tablet 10 mg, 10 mg, Oral, QHS, Codi Singh M.D., 10 mg at 03/24/17 2027  •  sertraline (ZOLOFT) tablet 100 mg, 100 mg, Oral, DAILY, Codi Singh M.D., 100 mg at 03/25/17 0813  •  trazodone (DESYREL) tablet 150 mg, 150 mg, Oral, QHS PRN, Codi Singh M.D., 150 mg at 03/24/17 2027  •  tramadol (ULTRAM) 50 MG tablet 50 mg, 50 mg, Oral, Q6HRS PRN, Codi Singh M.D., 50 mg at 03/24/17 0213  •  senna-docusate (PERICOLACE or SENOKOT S) 8.6-50 MG per tablet 2 Tab, 2 Tab, Oral, BID, 2 Tab at 03/25/17 0814 **AND** polyethylene glycol/lytes (MIRALAX) PACKET 1 Packet, 1 Packet, Oral, QDAY PRN, 1 Packet at 03/22/17 2040 **AND** magnesium hydroxide (MILK OF MAGNESIA) suspension 30 mL, 30 mL, Oral, QDAY PRN, 30 mL at 03/21/17 2133 **AND** bisacodyl (DULCOLAX) suppository 10 mg, 10 mg, Rectal, QDAY PRN, Codi Singh M.D., 10 mg at 03/23/17 0606  •  ondansetron (ZOFRAN) syringe/vial injection 4 mg, 4 mg, Intravenous, Q4HRS PRN, Codi Singh M.D.  •  ondansetron (ZOFRAN ODT) dispertab 4 mg, 4 mg, Oral, Q4HRS PRN, Codi Singh M.D.  •  morphine (pf) 4 mg/ml injection 2-4 mg, 2-4 mg, Intravenous, Q2HRS PRN, Codi Singh M.D., 4 mg at 03/17/17 0849  •  NS infusion 2,586 mL, 30 mL/kg, Intravenous, Once PRN, Codi Singh M.D.  •  NS (BOLUS) infusion 500 mL, 500 mL, Intravenous, Once PRN, Codi Singh M.D., Stopped at 03/17/17 0846    Labs:  Recent Labs      03/23/17   0330   WBC  6.1   RBC  2.75*   HEMOGLOBIN  7.9*   HEMATOCRIT  24.3*   MCV  88.4   MCH  28.7   RDW  47.0   PLATELETCT  274   MPV  10.3   NEUTSPOLYS  83.50*   LYMPHOCYTES  9.50*   MONOCYTES  2.60   EOSINOPHILS  3.50   BASOPHILS  0.00   RBCMORPHOLO  Present     Recent Labs      03/23/17   0330   SODIUM  134*   POTASSIUM  4.0   CHLORIDE  99   CO2  28   GLUCOSE  137*    BUN  7*     Recent Labs      03/23/17   0330   ALBUMIN  3.0*   TBILIRUBIN  0.6   ALKPHOSPHAT  80   TOTPROTEIN  5.9*   ALTSGPT  5   ASTSGOT  14   CREATININE  0.78       Imaging:  Ct-extremity, Lower With Left  3/16/2017  1.  Limited assessment of left lateral thigh fluid collection due to artifact secondary to hardware 2.  Fluid collection is at least 15 cm in length and probably subcutaneous in location 3.  Left hip arthroplasty, left knee arthroplasty, left femoral hardware present    Us-liver And Biliary Tree  3/16/2017  1.  Prior cholecystectomy 2.  Moderate biliary dilation with common bile duct measuring 1.5 cm 3.  Fatty infiltration of the liver     Dx-portable Fluoroscopy < 1 Hour    3/16/2017  3/16/2017 6:13 PM HISTORY/REASON FOR EXAM:  Left hip irrigation and debridement. Left lateral thigh fluid collection. Left hip arthroplasty. TECHNIQUE/EXAM DESCRIPTION AND NUMBER OF VIEWS:  1 views of the LEFT hip. COMPARISON: CT scan 3/16/2017 FINDINGS: Single fluoroscopic image demonstrates a needle projecting over the left hip arthroplasty laterally. Fluoroscopy time of 6 seconds  was utilized by THIEN CORREA for this procedure.     3/16/2017  Intraoperative fluoroscopic spot images as described above.    Le Venous Duplex (specify In Comments Left, Right Or Bilateral)   FINDINGS:  Left lower extremity -.  Complete color filling and compressibility with normal venous flow dynamics  including spontaneous flow, response to augmentation maneuvers, and  respiratory phasicity.  No superficial or deep venous thrombosis.  The peroneal and posterior tibial veins are difficult to assess for  compressibility, but flow response to augmentation is demonstrated.  Lateral left thigh at the area of concern: 17.3x5.9x3.5cm complex fluid  collection. This area is at a previous incision site.  Flow was evaluated in the contralateral common femoral vein and normal  venous flow dynamics including spontaneous flow, respiratory  phasic  variation and augmentation were demonstrated.  Zander Cruz  (Electronically Signed)  Final Date:      16 March 2017                   00:09      Micro:  BLOOD CULTURE   Date Value Ref Range Status   03/17/2017 No growth after 5 days of incubation.  Final      Results     Procedure Component Value Units Date/Time    ANAEROBIC CULTURE [[id number]] Collected:  03/19/17 1040    Order Status:  Completed Specimen Information:  Tissue Updated:  03/24/17 1138     Significant Indicator NEG      Source TISS      Site Left Synovium      Anaerobic Culture, Culture Res No Anaerobes isolated.     CULTURE TISSUE W/ GRM STAIN [[id number]]  (Abnormal) Collected:  03/19/17 1040    Order Status:  Completed Specimen Information:  Tissue Updated:  03/24/17 1138     Gram Stain Result No organisms seen.      Significant Indicator POS (POS)      Source TISS      Site Left Synovium      Tissue Culture -- (A)      Result:        Growth noted after further incubation,see below for  organism identification.       Tissue Culture -- (A)      Result:        Staphylococcus aureus  Single colony  See previous culture for sensitivity report.      CULTURE WOUND W/ GRAM STAIN [[id number]]  (Abnormal) Collected:  03/19/17 1030    Order Status:  Completed Specimen Information:  Wound Updated:  03/22/17 1521     Gram Stain Result --      Result:        Many WBCs.  Rare Gram positive cocci.       Significant Indicator POS (POS)      Source WND      Site Left Knee Fluid      Culture Result Wound -- (A)      Culture Result Wound -- (A)      Result:        Staphylococcus aureus  Rare growth  See previous culture for sensitivity report.      Narrative:      CALL  Anderson  131 tel. [phone],  CALLED  131 tel. [phone] 03/20/2017, 11:40, RB PERF. RESULTS CALLED  TO:[id number] ()    ANAEROBIC CULTURE [[id number]] Collected:  03/19/17 1030    Order Status:  Completed Specimen Information:  Wound Updated:  03/22/17 1521     Significant Indicator NEG       Source WND      Site Left Knee Fluid      Anaerobic Culture, Culture Res No Anaerobes isolated.     Narrative:      CALL  Anderson  131 tel. [phone],  CALLED  131 tel. [phone] 03/20/2017, 11:40, RB PERF. RESULTS CALLED  TO:[id number] (SA)    CULTURE WOUND W/ GRAM STAIN [[id number]]  (Abnormal) Collected:  03/19/17 1030    Order Status:  Completed Specimen Information:  Bone Updated:  03/22/17 1519     Gram Stain Result No organisms seen.      Significant Indicator POS (POS)      Source BONE      Site Left Femur      Culture Result Wound -- (A)      Result:        Growth noted after further incubation,see below for  organism identification.       Culture Result Wound -- (A)      Result:        Staphylococcus aureus  Single colony  See previous culture for sensitivity report.      ANAEROBIC CULTURE [[id number]] Collected:  03/19/17 1030    Order Status:  Completed Specimen Information:  Bone Updated:  03/22/17 1519     Significant Indicator NEG      Source BONE      Site Left Femur      Anaerobic Culture, Culture Res No Anaerobes isolated.     CULTURE TISSUE W/ GRM STAIN [[id number]]  (Abnormal) Collected:  03/19/17 1030    Order Status:  Completed Specimen Information:  Bone Updated:  03/22/17 1518     Gram Stain Result No organisms seen.      Significant Indicator POS (POS)      Source BONE      Site Left Tibia      Tissue Culture -- (A)      Result:        Growth noted after further incubation,see below for  organism identification.       Tissue Culture -- (A)      Result:        Staphylococcus aureus  Rare growth  See previous culture for sensitivity report.      ANAEROBIC CULTURE [[id number]] Collected:  03/19/17 1030    Order Status:  Completed Specimen Information:  Bone Updated:  03/22/17 1518     Significant Indicator NEG      Source BONE      Site Left Tibia      Anaerobic Culture, Culture Res No Anaerobes isolated.     CULTURE TISSUE W/ GRM STAIN [[id number]]  (Abnormal)  (Susceptibility) Collected:  03/19/17  "1030    Order Status:  Completed Specimen Information:  Bone Updated:  03/22/17 1514     Significant Indicator POS (POS)      Source BONE      Site Left Patella      Tissue Culture -- (A)      Gram Stain Result No organisms seen.      Tissue Culture -- (A)      Result:        Staphylococcus aureus  Rare growth      Narrative:      CALL  Anderson  131 tel. [phone],  CALLED  131 tel. [phone] 03/20/2017, 11:40, RB PERF. RESULTS CALLED  TO:[phone]()    Culture & Susceptibility     STAPHYLOCOCCUS AUREUS     Antibiotic Sensitivity Microscan Unit Status    Ampicillin/sulbactam Sensitive <=8/4 mcg/mL Final    Clindamycin Sensitive <=0.5 mcg/mL Final    Daptomycin Sensitive <=0.5 mcg/mL Final    Erythromycin Sensitive <=0.5 mcg/mL Final    Moxifloxacin Sensitive <=0.5 mcg/mL Final    Oxacillin Sensitive 0.5 mcg/mL Final    Penicillin Resistant >8 mcg/mL Final    Tetracycline Sensitive <=4 mcg/mL Final    Trimeth/Sulfa Sensitive <=0.5/9.5 mcg/mL Final    Vancomycin Sensitive 2 mcg/mL Final                       ANAEROBIC CULTURE [[id number]] Collected:  03/19/17 1030    Order Status:  Completed Specimen Information:  Bone Updated:  03/22/17 1514     Significant Indicator NEG      Source BONE      Site Left Patella      Anaerobic Culture, Culture Res No Anaerobes isolated.     Narrative:      CALL  Anderson  131 tel. [phone],  CALLED  131 tel. [phone] 03/20/2017, 11:40, RB PERF. RESULTS CALLED  TO:[id number]()    BLOOD CULTURE [[id number]] Collected:  03/17/17 1242    Order Status:  Completed Specimen Information:  Blood from Peripheral Updated:  03/22/17 1500     Significant Indicator NEG      Source BLD      Site PERIPHERAL      Blood Culture No growth after 5 days of incubation.     Narrative:      Per Hospital Policy: Only change Specimen Src: to \"Line\" if  specified by physician order.    BLOOD CULTURE [[id number]] Collected:  03/17/17 1247    Order Status:  Completed Specimen Information:  Blood from Peripheral " "Updated:  03/22/17 1500     Significant Indicator NEG      Source BLD      Site PERIPHERAL      Blood Culture No growth after 5 days of incubation.     Narrative:      Per Hospital Policy: Only change Specimen Src: to \"Line\" if  specified by physician order.    GRAM STAIN [[id number]] Collected:  03/19/17 1030    Order Status:  Completed Specimen Information:  Wound Updated:  03/19/17 1400     Significant Indicator .      Source WND      Site Left Knee Fluid      Gram Stain Result --      Result:        Many WBCs.  Rare Gram positive cocci.      GRAM STAIN [[id number]] Collected:  03/19/17 1030    Order Status:  Completed Specimen Information:  Bone Updated:  03/19/17 1359     Significant Indicator .      Source BONE      Site Left Tibia      Gram Stain Result No organisms seen.     GRAM STAIN [[id number]] Collected:  03/19/17 1030    Order Status:  Completed Specimen Information:  Bone Updated:  03/19/17 1359     Significant Indicator .      Source BONE      Site Left Femur      Gram Stain Result No organisms seen.     GRAM STAIN [[id number]] Collected:  03/19/17 1040    Order Status:  Completed Specimen Information:  Tissue Updated:  03/19/17 1359     Significant Indicator .      Source TISS      Site Left Synovium      Gram Stain Result No organisms seen.     GRAM STAIN [[id number]] Collected:  03/19/17 1030    Order Status:  Completed Specimen Information:  Bone Updated:  03/19/17 1359     Significant Indicator .      Source BONE      Site Left Patella      Gram Stain Result No organisms seen.     CULTURE TISSUE W/ GRM STAIN [[id number]]  (Abnormal) Collected:  03/16/17 1943    Order Status:  Completed Specimen Information:  Bone Updated:  03/19/17 1202     Gram Stain Result --      Result:        Rare WBCs.  No organisms seen.       Significant Indicator POS (POS)      Source BONE      Site Left Knee      Tissue Culture -- (A)      Result:        Growth noted after further incubation,see below for  organism " identification.       Tissue Culture -- (A)      Result:        Staphylococcus aureus  Light growth  See previous culture for sensitivity report.      ANAEROBIC CULTURE [[id number]] Collected:  03/16/17 1943    Order Status:  Completed Specimen Information:  Bone Updated:  03/19/17 1202     Significant Indicator NEG      Source BONE      Site Left Knee      Anaerobic Culture, Culture Res No Anaerobes isolated.     CULTURE WOUND W/ GRAM STAIN [[id number]]  (Abnormal) Collected:  03/16/17 1830    Order Status:  Completed Specimen Information:  Wound Updated:  03/19/17 1201     Gram Stain Result --      Result:        Moderate WBCs.  Rare Gram positive cocci.       Significant Indicator POS (POS)      Source WND      Site Left Knee Aspirate      Culture Result Wound -- (A)      Result:        Growth noted after further incubation,see below for  organism identification.       Culture Result Wound -- (A)      Result:        Staphylococcus aureus  Heavy growth  See previous culture for sensitivity report.      ANAEROBIC CULTURE [[id number]] Collected:  03/16/17 1830    Order Status:  Completed Specimen Information:  Wound Updated:  03/19/17 1201     Significant Indicator NEG      Source WND      Site Left Knee Aspirate      Anaerobic Culture, Culture Res No Anaerobes isolated.     ANAEROBIC CULTURE [[id number]] Collected:  03/16/17 1945    Order Status:  Completed Specimen Information:  Bone Updated:  03/19/17 1159     Significant Indicator NEG      Source BONE      Site Left Femur      Anaerobic Culture, Culture Res No Anaerobes isolated.     CULTURE TISSUE W/ GRM STAIN [[id number]]  (Abnormal)  (Susceptibility) Collected:  03/16/17 1945    Order Status:  Completed Specimen Information:  Bone Updated:  03/19/17 1159     Significant Indicator POS (POS)      Source BONE      Site Left Femur      Tissue Culture -- (A)      Result:        Growth noted after further incubation,see below for  organism identification.       Gram  "Stain Result --      Result:        Rare WBCs.  No organisms seen.       Tissue Culture -- (A)      Result:        Staphylococcus aureus  Moderate growth      Culture & Susceptibility     STAPHYLOCOCCUS AUREUS     Antibiotic Sensitivity Microscan Unit Status    Ampicillin/sulbactam Sensitive <=8/4 mcg/mL Final    Clindamycin Sensitive <=0.5 mcg/mL Final    Daptomycin Sensitive <=0.5 mcg/mL Final    Erythromycin Sensitive <=0.5 mcg/mL Final    Moxifloxacin Sensitive <=0.5 mcg/mL Final    Oxacillin Sensitive 0.5 mcg/mL Final    Penicillin Resistant >8 mcg/mL Final    Tetracycline Sensitive <=4 mcg/mL Final    Trimeth/Sulfa Sensitive <=0.5/9.5 mcg/mL Final    Vancomycin Sensitive 1 mcg/mL Final                       Blood Culture [[id number]]  (Abnormal) Collected:  03/16/17 0740    Order Status:  Completed Specimen Information:  Blood from Peripheral Updated:  03/19/17 0856     Significant Indicator POS (POS)      Source BLD      Site PERIPHERAL      Blood Culture        Result:        Growth detected by Bactec instrument.  03/16/2017  22:51 (A)     Blood Culture -- (A)      Result:        Staphylococcus aureus  See previous culture for sensitivity report.      Narrative:      CALL  Anderson  131 tel. [phone], RB PERF. RESULTS CALLED TO: ANISH Fink  CALLED  131 tel. [phone] 03/16/2017, 22:55, RB PERF. RESULTS CALLED TO: RN  [phone]  Per Hospital Policy: Only change Specimen Src: to \"Line\" if  specified by physician order.    Blood Culture [[id number]]  (Abnormal)  (Susceptibility) Collected:  03/16/17 0741    Order Status:  Completed Specimen Information:  Blood from Peripheral Updated:  03/19/17 0855     Significant Indicator POS (POS)      Source BLD      Site PERIPHERAL      Blood Culture        Result:        Growth detected by Bactec instrument.  03/17/2017  02:46 (A)     Blood Culture Staphylococcus aureus (A)     Narrative:      CALL  Anderson  131 tel. [phone],  CALLED  131 tel. [phone] 03/17/2017, 02:50, RB " "PERF. RESULTS CALLED TO: RN  [phone]  Per Hospital Policy: Only change Specimen Src: to \"Line\" if  specified by physician order.    Culture & Susceptibility     STAPHYLOCOCCUS AUREUS     Antibiotic Sensitivity Microscan Unit Status    Ampicillin/sulbactam Sensitive <=8/4 mcg/mL Final    Clindamycin Sensitive <=0.5 mcg/mL Final    Daptomycin Sensitive 1 mcg/mL Final    Erythromycin Sensitive <=0.5 mcg/mL Final    Moxifloxacin Sensitive <=0.5 mcg/mL Final    Oxacillin Sensitive 0.5 mcg/mL Final    Penicillin Resistant >8 mcg/mL Final    Tetracycline Sensitive <=4 mcg/mL Final    Trimeth/Sulfa Resistant >2/38 mcg/mL Final    Vancomycin Sensitive 2 mcg/mL Final                               Assessment:  Active Hospital Problems    Diagnosis   • *Fluid collection at surgical site [T88.8XXA]   • Staphylococcus aureus bacteremia [R78.81]   • Common bile duct dilatation [K83.8]   • Diabetes mellitus type II, controlled (CMS-[facility]) [E11.9]   • Urinary incontinence with continuous leakage [N39.45]       Plan:  Left femur hardware infection with abscess  S/p removal 03/16/17-OR Cx - MSSA  S/p repeat I&D with liner exchange on 3/19 with abx bead placement  Cultures MSSA  Continue IV Cefazolin. Anticipate 6 week course from 3/16/17, end date 4/27/17.    Left septic knee arthritis  S/p arthrotomy and drainage of knee with liner exchange 3/16  S/p repeat I&D as above.  Abx per above    MSSA bacteremia  3/16 Bcx - positive   3/17 Bcx - NGTD  Abx per above  YOMI negative    DM2  Optimize BS control to assist with wound healing, DM can worsen infection.    DW IM-for transfer to SNF soon    "

## 2017-03-25 NOTE — PROGRESS NOTES
Hospital Medicine Progress Note, Adult, Complex               Author: Luis Hodges Date & Time created: 3/25/2017  7:29 AM     Interval History:  65 y/o female LLE fluid colleciton, s/p arthrotomy, hardware removal and left knee hardware removal with liner exchange. Cx positive for MSSA.   Tried to get patient to snf today. She was accepted to [facility] but apparently they cannot take her today.     Review of Systems:  Review of Systems   Constitutional: Negative for fever and chills.   HENT: Negative for sore throat.    Eyes: Negative for blurred vision and pain.   Respiratory: Negative for shortness of breath.    Cardiovascular: Negative for palpitations.   Gastrointestinal: Negative for nausea and abdominal pain.   Genitourinary: Negative for dysuria and urgency.   Musculoskeletal: Negative for back pain and neck pain.   Skin: Negative for itching and rash.   Neurological: Negative for dizziness, tingling and headaches.   Psychiatric/Behavioral: Negative for depression. The patient does not have insomnia.    All other systems reviewed and are negative.      Physical Exam:  Physical Exam   Constitutional: She is oriented to person, place, and time. She appears well-developed and well-nourished. No distress.   HENT:   Right Ear: External ear normal.   Left Ear: External ear normal.   Nose: Nose normal.   Mouth/Throat: No oropharyngeal exudate.   Eyes: Right eye exhibits no discharge. Left eye exhibits no discharge. No scleral icterus.   Neck: Normal range of motion. Neck supple. No JVD present.   Cardiovascular: Normal rate, regular rhythm, normal heart sounds and intact distal pulses.    No murmur heard.  Pulmonary/Chest: Effort normal and breath sounds normal. No stridor. No respiratory distress. She has no rales.   Abdominal: Soft. Bowel sounds are normal. She exhibits no distension. There is no tenderness.   Musculoskeletal: Normal range of motion. She exhibits edema and tenderness.   Multiple surgical scars,  bandages in place   Neurological: She is alert and oriented to person, place, and time. No cranial nerve deficit.   Skin: Skin is warm. No rash noted. She is diaphoretic. No erythema.   Psychiatric: She has a normal mood and affect. Her behavior is normal.   Nursing note and vitals reviewed.      Labs:        Invalid input(s): [id number]      Recent Labs      17   0330   SODIUM  134*   POTASSIUM  4.0   CHLORIDE  99   CO2  28   BUN  7*   CREATININE  0.78   MAGNESIUM  2.2   CALCIUM  9.7     Recent Labs      17   033   ALTSGPT  5   ASTSGOT  14   ALKPHOSPHAT  80   TBILIRUBIN  0.6   GLUCOSE  137*     Recent Labs      17   0330   RBC  2.75*   HEMOGLOBIN  7.9*   HEMATOCRIT  24.3*   PLATELETCT  274   IRON  29*   TOTIRONBC  265     Recent Labs      17   033   WBC  6.1   NEUTSPOLYS  83.50*   LYMPHOCYTES  9.50*   MONOCYTES  2.60   EOSINOPHILS  3.50   BASOPHILS  0.00   ASTSGOT  14   ALTSGPT  5   ALKPHOSPHAT  80   TBILIRUBIN  0.6           Hemodynamics:  Temp (24hrs), Av.7 °C (98.1 °F), Min:36.3 °C (97.3 °F), Max:37.4 °C (99.4 °F)  Temperature: 37.4 °C (99.4 °F)  Pulse  Av.8  Min: 53  Max: 97   Blood Pressure : 126/73 mmHg     Respiratory:    Respiration: 17, Pulse Oximetry: 93 %           Fluids:  No intake or output data in the 24 hours ending 17 0729     GI/Nutrition:  Orders Placed This Encounter   Procedures   • DIET ORDER     Standing Status: Standing      Number of Occurrences: 1      Standing Expiration Date:      Order Specific Question:  Diet:     Answer:  Diabetic [3]     Order Specific Question:  Diet:     Answer:  Low Fiber(GI Soft) [2]     Medical Decision Making, by Problem:  Active Hospital Problems    Diagnosis   • *Fluid collection at surgical site [T88.8XXA]-c/f infection, doing well  -s/p hardware removal and arthrotomy and drainage of the left knee with liner exchange and I&D  -mssa in cx. abx per ID ongoing. YOMI pending for today.    • Fatty liver disease,  nonalcoholic [K76.0]   • Dental caries [K02.9]-recent teeth extraction, zoya pending as above.    • Common bile duct dilatation [K83.8]-consistent with previous cholecystectomy.    • Elevated LFTs [R79.89]-resolved. Normal workup.    • Depression [F32.9]-outpatient treatment, zoloft   • Vitamin D deficiency [E55.9]-replacement   • Diabetes mellitus type II, controlled (CMS-HCC) [E11.9]-ISS, adjust PRN   • HTN (hypertension) [I10]-well controlled, continue current meds   • HLD (hyperlipidemia) [E78.5]-statin   • GERD (gastroesophageal reflux disease) [K21.9]-PPI   • Urinary incontinence with continuous leakage [N39.45]-ditropan     Staph bacteremia-confirmed MSSA-abx per ID, recent dental work as above. ZOYA pending.   Hypokalemia-replace prn      Anemia- fe studies low. Start replacement.   Mag - repleated  Low b12- start replacement.     Discussed plan with RN  dispo- cleared to go to snf when bed available.     Labs reviewed, Medications reviewed and EKG reviewed  Shannon catheter: No Shannon        DVT prophylaxis - mechanical: SCDs      Assessed for rehab: Patient was assess for and/or received rehabilitation services during this hospitalization

## 2017-03-26 LAB
ABO GROUP BLD: NORMAL
ANION GAP SERPL CALC-SCNC: 7 MMOL/L (ref 0–11.9)
BARCODED ABORH UBTYP: 5100
BARCODED PRD CODE UBPRD: NORMAL
BARCODED UNIT NUM UBUNT: NORMAL
BASOPHILS # BLD AUTO: 0.3 % (ref 0–1.8)
BASOPHILS # BLD: 0.03 K/UL (ref 0–0.12)
BLD GP AB SCN SERPL QL: NORMAL
BUN SERPL-MCNC: 7 MG/DL (ref 8–22)
CALCIUM SERPL-MCNC: 9.4 MG/DL (ref 8.5–10.5)
CHLORIDE SERPL-SCNC: 101 MMOL/L (ref 96–112)
CO2 SERPL-SCNC: 25 MMOL/L (ref 20–33)
COMPONENT R 8504R: NORMAL
CREAT SERPL-MCNC: 0.9 MG/DL (ref 0.5–1.4)
EOSINOPHIL # BLD AUTO: 0.28 K/UL (ref 0–0.51)
EOSINOPHIL NFR BLD: 2.7 % (ref 0–6.9)
ERYTHROCYTE [DISTWIDTH] IN BLOOD BY AUTOMATED COUNT: 46.8 FL (ref 35.9–50)
GFR SERPL CREATININE-BSD FRML MDRD: >60 ML/MIN/1.73 M 2
GLUCOSE BLD-MCNC: 128 MG/DL (ref 65–99)
GLUCOSE BLD-MCNC: 130 MG/DL (ref 65–99)
GLUCOSE BLD-MCNC: 132 MG/DL (ref 65–99)
GLUCOSE BLD-MCNC: 143 MG/DL (ref 65–99)
GLUCOSE SERPL-MCNC: 140 MG/DL (ref 65–99)
HCT VFR BLD AUTO: 21.1 % (ref 37–47)
HGB BLD-MCNC: 6.9 G/DL (ref 12–16)
IMM GRANULOCYTES # BLD AUTO: 0.2 K/UL (ref 0–0.11)
IMM GRANULOCYTES NFR BLD AUTO: 2 % (ref 0–0.9)
LYMPHOCYTES # BLD AUTO: 1.45 K/UL (ref 1–4.8)
LYMPHOCYTES NFR BLD: 14.2 % (ref 22–41)
MCH RBC QN AUTO: 29.4 PG (ref 27–33)
MCHC RBC AUTO-ENTMCNC: 32.7 G/DL (ref 33.6–35)
MCV RBC AUTO: 89.8 FL (ref 81.4–97.8)
MONOCYTES # BLD AUTO: 0.67 K/UL (ref 0–0.85)
MONOCYTES NFR BLD AUTO: 6.5 % (ref 0–13.4)
NEUTROPHILS # BLD AUTO: 7.61 K/UL (ref 2–7.15)
NEUTROPHILS NFR BLD: 74.3 % (ref 44–72)
NRBC # BLD AUTO: 0 K/UL
NRBC BLD AUTO-RTO: 0 /100 WBC
PLATELET # BLD AUTO: 361 K/UL (ref 164–446)
PMV BLD AUTO: 10.4 FL (ref 9–12.9)
POTASSIUM SERPL-SCNC: 3.9 MMOL/L (ref 3.6–5.5)
PRODUCT TYPE UPROD: NORMAL
RBC # BLD AUTO: 2.35 M/UL (ref 4.2–5.4)
RH BLD: NORMAL
SODIUM SERPL-SCNC: 133 MMOL/L (ref 135–145)
UNIT STATUS USTAT: NORMAL
WBC # BLD AUTO: 10.2 K/UL (ref 4.8–10.8)

## 2017-03-26 PROCEDURE — 700111 HCHG RX REV CODE 636 W/ 250 OVERRIDE (IP): Performed by: INTERNAL MEDICINE

## 2017-03-26 PROCEDURE — A9270 NON-COVERED ITEM OR SERVICE: HCPCS | Performed by: HOSPITALIST

## 2017-03-26 PROCEDURE — 700111 HCHG RX REV CODE 636 W/ 250 OVERRIDE (IP): Performed by: ORTHOPAEDIC SURGERY

## 2017-03-26 PROCEDURE — 700102 HCHG RX REV CODE 250 W/ 637 OVERRIDE(OP): Performed by: HOSPITALIST

## 2017-03-26 PROCEDURE — 700102 HCHG RX REV CODE 250 W/ 637 OVERRIDE(OP): Performed by: INTERNAL MEDICINE

## 2017-03-26 PROCEDURE — P9016 RBC LEUKOCYTES REDUCED: HCPCS

## 2017-03-26 PROCEDURE — A9270 NON-COVERED ITEM OR SERVICE: HCPCS | Performed by: INTERNAL MEDICINE

## 2017-03-26 PROCEDURE — 36430 TRANSFUSION BLD/BLD COMPNT: CPT

## 2017-03-26 PROCEDURE — 770006 HCHG ROOM/CARE - MED/SURG/GYN SEMI*

## 2017-03-26 PROCEDURE — 85025 COMPLETE CBC W/AUTO DIFF WBC: CPT

## 2017-03-26 PROCEDURE — 86923 COMPATIBILITY TEST ELECTRIC: CPT

## 2017-03-26 PROCEDURE — 86900 BLOOD TYPING SEROLOGIC ABO: CPT

## 2017-03-26 PROCEDURE — 99232 SBSQ HOSP IP/OBS MODERATE 35: CPT | Performed by: HOSPITALIST

## 2017-03-26 PROCEDURE — 80048 BASIC METABOLIC PNL TOTAL CA: CPT

## 2017-03-26 PROCEDURE — 86850 RBC ANTIBODY SCREEN: CPT

## 2017-03-26 PROCEDURE — 82962 GLUCOSE BLOOD TEST: CPT | Mod: 91

## 2017-03-26 PROCEDURE — 86901 BLOOD TYPING SEROLOGIC RH(D): CPT

## 2017-03-26 RX ORDER — SODIUM CHLORIDE 450 MG/100ML
INJECTION, SOLUTION INTRAVENOUS
Status: ACTIVE
Start: 2017-03-26 | End: 2017-03-27

## 2017-03-26 RX ADMIN — FERROUS GLUCONATE 324 MG: 324 TABLET ORAL at 06:14

## 2017-03-26 RX ADMIN — LOSARTAN POTASSIUM 50 MG: 50 TABLET, FILM COATED ORAL at 08:11

## 2017-03-26 RX ADMIN — Medication 2 TABLET: at 19:37

## 2017-03-26 RX ADMIN — OXYBUTYNIN CHLORIDE 10 MG: 5 TABLET ORAL at 19:36

## 2017-03-26 RX ADMIN — OMEPRAZOLE 20 MG: 20 CAPSULE, DELAYED RELEASE ORAL at 08:11

## 2017-03-26 RX ADMIN — FERROUS GLUCONATE 324 MG: 324 TABLET ORAL at 17:31

## 2017-03-26 RX ADMIN — SERTRALINE 100 MG: 100 TABLET, FILM COATED ORAL at 08:10

## 2017-03-26 RX ADMIN — CEFAZOLIN SODIUM 2 G: 2 INJECTION, SOLUTION INTRAVENOUS at 06:14

## 2017-03-26 RX ADMIN — TRAZODONE HYDROCHLORIDE 150 MG: 150 TABLET ORAL at 19:36

## 2017-03-26 RX ADMIN — OXYCODONE HYDROCHLORIDE 10 MG: 10 TABLET ORAL at 14:54

## 2017-03-26 RX ADMIN — ENOXAPARIN SODIUM 40 MG: 100 INJECTION SUBCUTANEOUS at 08:10

## 2017-03-26 RX ADMIN — POTASSIUM CHLORIDE 30 MEQ: 750 TABLET, FILM COATED, EXTENDED RELEASE ORAL at 19:37

## 2017-03-26 RX ADMIN — OXYCODONE HYDROCHLORIDE 10 MG: 10 TABLET ORAL at 03:49

## 2017-03-26 RX ADMIN — Medication 2 TABLET: at 08:11

## 2017-03-26 RX ADMIN — OXYCODONE HYDROCHLORIDE 10 MG: 10 TABLET ORAL at 08:11

## 2017-03-26 RX ADMIN — CYANOCOBALAMIN TAB 500 MCG 1000 MCG: 500 TAB at 08:11

## 2017-03-26 RX ADMIN — CEFAZOLIN SODIUM 2 G: 2 INJECTION, SOLUTION INTRAVENOUS at 19:40

## 2017-03-26 RX ADMIN — OXYCODONE HYDROCHLORIDE 10 MG: 10 TABLET ORAL at 19:39

## 2017-03-26 RX ADMIN — POTASSIUM CHLORIDE 30 MEQ: 750 TABLET, FILM COATED, EXTENDED RELEASE ORAL at 08:11

## 2017-03-26 ASSESSMENT — ENCOUNTER SYMPTOMS
NAUSEA: 0
FEVER: 0
TINGLING: 0
BLURRED VISION: 0
EYE PAIN: 0
DEPRESSION: 0
PALPITATIONS: 0
CHILLS: 0
HEADACHES: 0
INSOMNIA: 0
DIZZINESS: 0
COUGH: 0
ABDOMINAL PAIN: 0
NECK PAIN: 0
BACK PAIN: 0
SHORTNESS OF BREATH: 0
SORE THROAT: 0
DIARRHEA: 0
VOMITING: 0

## 2017-03-26 ASSESSMENT — PAIN SCALES - GENERAL
PAINLEVEL_OUTOF10: 5
PAINLEVEL_OUTOF10: 5

## 2017-03-26 NOTE — PROGRESS NOTES
Infectious Disease Progress Note    Author: Bre Oconnor M.D. DOS & Time created: 3/26/2017  3:37 PM    Chief Complaint:  Chief Complaint   Patient presents with   • Leg Pain     L   FU for left femoral hardware infection and left septic knee arthritis.  S/p I&D to L knee and thigh, with placement of abx beads to L knee and thigh on 3/19 with Dr. Irving.    Interval History:  3/18 AF, WBC 7.2, states her drains were removed yesterday, is willing to work with PT, pain stable and will return to the OR early next week, Bx - MSSA  3/19 AF, no CBC, plan to return to OR today for repeat I&D and liner exchange  3/20 Tm 99.7, WBC 8.1.  Pain is continuous and severe, 10/10 with movement and 6/10 at rest.  3/21- AF, no CBC.  Pain improved, down to 4/10 to L leg.  Would like to consider home infusion versus SNF.  3/22- Tm 100.4, WBC 5.9.  Left leg pain remains around 4/10.  Now ok going to a SNF, as long as it is in [place].  Tolerating abx without SE.  3/23- Tm 99.5, WBC 6.1.  Left leg pain continues around 4/10.  NPO, awaiting YOMI.  3/24 AF WBC not done YOMI neg Plan for SNF today-no new complaints  3/25 AF -difficulties with transfer-tolerating abx  3/26 AF trying to sleep-no clinical change from 3/25  Labs Reviewed, Medications Reviewed, Radiology Reviewed and Wound Reviewed.    Review of Systems:  Review of Systems   Constitutional: Negative for fever and chills.   Respiratory: Negative for cough and shortness of breath.    Cardiovascular: Negative for chest pain.   Gastrointestinal: Negative for nausea, vomiting, abdominal pain and diarrhea.   Genitourinary: Negative for dysuria.   Musculoskeletal: Positive for joint pain.        Improving   Skin: Negative for rash.   Neurological: Negative for headaches.       Hemodynamics:  Temp (24hrs), Av.7 °C (98.1 °F), Min:36.2 °C (97.1 °F), Max:37.5 °C (99.5 °F)  Temperature: 36.5 °C (97.7 °F)  Pulse  Av.1  Min: 53  Max: 97   Blood Pressure : 115/57 mmHg        Physical Exam:  Physical Exam   Constitutional: She is oriented to person, place, and time. She appears well-developed and well-nourished. No distress.   Obese   HENT:   Head: Normocephalic and atraumatic.   Eyes: EOM are normal. Pupils are equal, round, and reactive to light.   Neck: Normal range of motion. Neck supple.   Cardiovascular: Normal rate, regular rhythm and normal heart sounds.    Pulmonary/Chest: Effort normal and breath sounds normal. No respiratory distress.   Abdominal: Soft. Bowel sounds are normal. She exhibits no distension. There is no tenderness.   Musculoskeletal: She exhibits tenderness.   LLE-  surgical dressing in place.  Toes warm.  Per Ortho note well-approx  RUE PICC- non tender, no erythema.   Neurological: She is alert and oriented to person, place, and time.   Skin: Skin is warm and dry. No erythema.   Nursing note and vitals reviewed.      Meds:    Current facility-administered medications:   •  SODIUM CHLORIDE 0.45 % IV SOLN, , , ,   •  ferrous gluconate (FERGON) tablet 324 mg, 324 mg, Oral, BID WITH MEALS, Luis Hodges M.D., 324 mg at 03/26/17 0614  •  cyanocobalamin (VITAMIN B-12) tablet 1,000 mcg, 1,000 mcg, Oral, DAILY, Luis Hodges M.D., 1,000 mcg at 03/26/17 0811  •  PICC Line Insertion has been implemented, , , Once **AND** May use Lidocaine 1% not to exceed 3 mls for local at insertion site, , , CONTINUOUS **AND** NOTIFY MD, , , Once **AND** Tip to dwell in the superior vena cava, , , CONTINUOUS **AND** Do not use PICC Line until placement verified by Chest X Ray, , , CONTINUOUS **AND** DX-CHEST-FOR PICC LINE Perform procedure in:: PICC Room, , , Once **AND** If radiologist reading of chest X-ray states any of the following the PICC should be used, , , CONTINUOUS **AND** Further evaluation of the PICC placement can be retrieved from X-Ray and Imaging, , , CONTINUOUS **AND** Blood draws through PICC line; draws by RN only, , , CONTINUOUS **AND** FLUSHING  GUIDELINES WHEN IN USE, , , CONTINUOUS **AND** normal saline PF 10-20 mL, 10-20 mL, Intravenous, PRN **AND** FLUSHING GUIDELINES WHEN NOT IN USE, , , CONTINUOUS **AND** DRESSING MAINTENANCE, , , Once **AND** Change needleless pressure ports and IV tubing every 72 hours per hospital policy, , , CONTINUOUS **AND** TUBING, , , CONTINUOUS **AND** If there is an MD order to remove the PICC line, any RN may remove the PICC line, , , CONTINUOUS **AND** [] PATIENT EDUCATION MATERIALS, , , Prior to discharge **AND** NURSING COMMUNICATION, , , CONTINUOUS, JIMMIE Roche  •  potassium chloride ER (KLOR-CON) tablet 30 mEq, 30 mEq, Oral, BID, David Sylvester M.D., 30 mEq at 17 0811  •  tobramycin (NEBCIN) injection 1.2 g, 1.2 g, Other, Intra-Op Once PRN, Rigo Irving M.D.  •  hydrALAZINE (APRESOLINE) injection 10 mg, 10 mg, Intravenous, Q6HRS PRN, David Sylvester M.D., 10 mg at 17 1615  •  insulin lispro (HUMALOG) injection 1-6 Units, 1-6 Units, Subcutaneous, 4X/DAY ACHS, David Sylvester M.D., Stopped at 17 0700  •  ceFAZolin (ANCEF) IVPB 2 g, 2 g, Intravenous, Q8HRS, Bre Oconnor M.D., 2 g at 17 0614  •  oxycodone immediate-release (ROXICODONE) tablet 5 mg, 5 mg, Oral, Q4HRS PRN, 5 mg at 17 0852 **OR** oxycodone immediate release (ROXICODONE) tablet 10 mg, 10 mg, Oral, Q4HRS PRN, David Sylvester M.D., 10 mg at 17 1454  •  Action is required: Protocol 1073 Hypoglycemia has been implemented, , , Once **AND** Protocol 1073 Inclusion Criteria, , , CONTINUOUS **AND** Protocol 1073 NOTIFY, , , Once **AND** Protocol 1073 Initiate protocol immediately if FSBG is less than or equal to 70 mg/dL, , , CONTINUOUS **AND** glucose 4 g chewable tablet 16 g, 16 g, Oral, Q15 MIN PRN **AND** dextrose 50% (D50W) injection 25 mL, 25 mL, Intravenous, Q15 MIN PRN, David Sylvester M.D.  •  enoxaparin (LOVENOX) inj 40 mg, 40 mg, Subcutaneous, DAILY, Rigo Irving M.D., 40  mg at 03/26/17 0810  •  losartan (COZAAR) tablet 50 mg, 50 mg, Oral, DAILY, Cdoi Singh M.D., 50 mg at 03/26/17 0811  •  omeprazole (PRILOSEC) capsule 20 mg, 20 mg, Oral, DAILY, Codi Singh M.D., 20 mg at 03/26/17 0811  •  oxybutynin (DITROPAN) tablet 10 mg, 10 mg, Oral, QHS, Codi Singh M.D., 10 mg at 03/25/17 1936  •  sertraline (ZOLOFT) tablet 100 mg, 100 mg, Oral, DAILY, Codi Singh M.D., 100 mg at 03/26/17 0810  •  trazodone (DESYREL) tablet 150 mg, 150 mg, Oral, QHS PRN, Codi Singh M.D., 150 mg at 03/25/17 2110  •  tramadol (ULTRAM) 50 MG tablet 50 mg, 50 mg, Oral, Q6HRS PRN, Codi Singh M.D., 50 mg at 03/24/17 0213  •  senna-docusate (PERICOLACE or SENOKOT S) 8.6-50 MG per tablet 2 Tab, 2 Tab, Oral, BID, 2 Tab at 03/26/17 0811 **AND** polyethylene glycol/lytes (MIRALAX) PACKET 1 Packet, 1 Packet, Oral, QDAY PRN, 1 Packet at 03/22/17 2040 **AND** magnesium hydroxide (MILK OF MAGNESIA) suspension 30 mL, 30 mL, Oral, QDAY PRN, 30 mL at 03/21/17 2133 **AND** bisacodyl (DULCOLAX) suppository 10 mg, 10 mg, Rectal, QDAY PRN, Codi Singh M.D., 10 mg at 03/23/17 0606  •  ondansetron (ZOFRAN) syringe/vial injection 4 mg, 4 mg, Intravenous, Q4HRS PRN, Codi Singh M.D.  •  ondansetron (ZOFRAN ODT) dispertab 4 mg, 4 mg, Oral, Q4HRS PRN, Codi Singh M.D.  •  morphine (pf) 4 mg/ml injection 2-4 mg, 2-4 mg, Intravenous, Q2HRS PRN, Codi Singh M.D., 4 mg at 03/17/17 0849  •  NS infusion 2,586 mL, 30 mL/kg, Intravenous, Once PRN, Codi Singh M.D.  •  NS (BOLUS) infusion 500 mL, 500 mL, Intravenous, Once PRN, Codi Singh M.D., Stopped at 03/17/17 0846    Labs:  Recent Labs      03/26/17   0415   WBC  10.2   RBC  2.35*   HEMOGLOBIN  6.9*   HEMATOCRIT  21.1*   MCV  89.8   MCH  29.4   RDW  46.8   PLATELETCT  361   MPV  10.4   NEUTSPOLYS  74.30*   LYMPHOCYTES  14.20*   MONOCYTES  6.50   EOSINOPHILS  2.70   BASOPHILS  0.30     Recent Labs      03/26/17   0415   SODIUM  133*    POTASSIUM  3.9   CHLORIDE  101   CO2  25   GLUCOSE  140*   BUN  7*     Recent Labs      03/26/17   0415   CREATININE  0.90       Imaging:  Ct-extremity, Lower With Left  3/16/2017  1.  Limited assessment of left lateral thigh fluid collection due to artifact secondary to hardware 2.  Fluid collection is at least 15 cm in length and probably subcutaneous in location 3.  Left hip arthroplasty, left knee arthroplasty, left femoral hardware present    Us-liver And Biliary Tree  3/16/2017  1.  Prior cholecystectomy 2.  Moderate biliary dilation with common bile duct measuring 1.5 cm 3.  Fatty infiltration of the liver     Dx-portable Fluoroscopy < 1 Hour    3/16/2017  3/16/2017 6:13 PM HISTORY/REASON FOR EXAM:  Left hip irrigation and debridement. Left lateral thigh fluid collection. Left hip arthroplasty. TECHNIQUE/EXAM DESCRIPTION AND NUMBER OF VIEWS:  1 views of the LEFT hip. COMPARISON: CT scan 3/16/2017 FINDINGS: Single fluoroscopic image demonstrates a needle projecting over the left hip arthroplasty laterally. Fluoroscopy time of 6 seconds  was utilized by THIEN CORREA for this procedure.     3/16/2017  Intraoperative fluoroscopic spot images as described above.    Le Venous Duplex (specify In Comments Left, Right Or Bilateral)   FINDINGS:  Left lower extremity -.  Complete color filling and compressibility with normal venous flow dynamics  including spontaneous flow, response to augmentation maneuvers, and  respiratory phasicity.  No superficial or deep venous thrombosis.  The peroneal and posterior tibial veins are difficult to assess for  compressibility, but flow response to augmentation is demonstrated.  Lateral left thigh at the area of concern: 17.3x5.9x3.5cm complex fluid  collection. This area is at a previous incision site.  Flow was evaluated in the contralateral common femoral vein and normal  venous flow dynamics including spontaneous flow, respiratory phasic  variation and augmentation were  demonstrated.  Zander Cruz  (Electronically Signed)  Final Date:      16 March 2017                   00:09      Micro:  BLOOD CULTURE   Date Value Ref Range Status   03/17/2017 No growth after 5 days of incubation.  Final      Results     Procedure Component Value Units Date/Time    ANAEROBIC CULTURE [[id number]] Collected:  03/19/17 1040    Order Status:  Completed Specimen Information:  Tissue Updated:  03/24/17 1138     Significant Indicator NEG      Source TISS      Site Left Synovium      Anaerobic Culture, Culture Res No Anaerobes isolated.     CULTURE TISSUE W/ GRM STAIN [[id number]]  (Abnormal) Collected:  03/19/17 1040    Order Status:  Completed Specimen Information:  Tissue Updated:  03/24/17 1138     Gram Stain Result No organisms seen.      Significant Indicator POS (POS)      Source TISS      Site Left Synovium      Tissue Culture -- (A)      Result:        Growth noted after further incubation,see below for  organism identification.       Tissue Culture -- (A)      Result:        Staphylococcus aureus  Single colony  See previous culture for sensitivity report.      CULTURE WOUND W/ GRAM STAIN [[id number]]  (Abnormal) Collected:  03/19/17 1030    Order Status:  Completed Specimen Information:  Wound Updated:  03/22/17 1521     Gram Stain Result --      Result:        Many WBCs.  Rare Gram positive cocci.       Significant Indicator POS (POS)      Source WND      Site Left Knee Fluid      Culture Result Wound -- (A)      Culture Result Wound -- (A)      Result:        Staphylococcus aureus  Rare growth  See previous culture for sensitivity report.      Narrative:      CALL  [facility]  131 tel. [phone],  CALLED  131 tel. [phone] 03/20/2017, 11:40, RB PERF. RESULTS CALLED  TO:[id number] ()    ANAEROBIC CULTURE [[id number]] Collected:  03/19/17 1030    Order Status:  Completed Specimen Information:  Wound Updated:  03/22/17 1521     Significant Indicator NEG      Source WND      Site Left Knee Fluid       Anaerobic Culture, Culture Res No Anaerobes isolated.     Narrative:      CALL  [facility]  131 tel. [phone],  CALLED  131 tel. [phone] 03/20/2017, 11:40, RB PERF. RESULTS CALLED  TO:[id number] (SA)    CULTURE WOUND W/ GRAM STAIN [[id number]]  (Abnormal) Collected:  03/19/17 1030    Order Status:  Completed Specimen Information:  Bone Updated:  03/22/17 1519     Gram Stain Result No organisms seen.      Significant Indicator POS (POS)      Source BONE      Site Left Femur      Culture Result Wound -- (A)      Result:        Growth noted after further incubation,see below for  organism identification.       Culture Result Wound -- (A)      Result:        Staphylococcus aureus  Single colony  See previous culture for sensitivity report.      ANAEROBIC CULTURE [[id number]] Collected:  03/19/17 1030    Order Status:  Completed Specimen Information:  Bone Updated:  03/22/17 1519     Significant Indicator NEG      Source BONE      Site Left Femur      Anaerobic Culture, Culture Res No Anaerobes isolated.     CULTURE TISSUE W/ GRM STAIN [[id number]]  (Abnormal) Collected:  03/19/17 1030    Order Status:  Completed Specimen Information:  Bone Updated:  03/22/17 1518     Gram Stain Result No organisms seen.      Significant Indicator POS (POS)      Source BONE      Site Left Tibia      Tissue Culture -- (A)      Result:        Growth noted after further incubation,see below for  organism identification.       Tissue Culture -- (A)      Result:        Staphylococcus aureus  Rare growth  See previous culture for sensitivity report.      ANAEROBIC CULTURE [[id number]] Collected:  03/19/17 1030    Order Status:  Completed Specimen Information:  Bone Updated:  03/22/17 1518     Significant Indicator NEG      Source BONE      Site Left Tibia      Anaerobic Culture, Culture Res No Anaerobes isolated.     CULTURE TISSUE W/ GRM STAIN [[id number]]  (Abnormal)  (Susceptibility) Collected:  03/19/17 1030    Order Status:  Completed Specimen  "Information:  Bone Updated:  03/22/17 1514     Significant Indicator POS (POS)      Source BONE      Site Left Patella      Tissue Culture -- (A)      Gram Stain Result No organisms seen.      Tissue Culture -- (A)      Result:        Staphylococcus aureus  Rare growth      Narrative:      CALL  [facility]  131 tel. [phone],  CALLED  131 tel. [phone] 03/20/2017, 11:40, RB PERF. RESULTS CALLED  TO:[id number]()    Culture & Susceptibility     STAPHYLOCOCCUS AUREUS     Antibiotic Sensitivity Microscan Unit Status    Ampicillin/sulbactam Sensitive <=8/4 mcg/mL Final    Clindamycin Sensitive <=0.5 mcg/mL Final    Daptomycin Sensitive <=0.5 mcg/mL Final    Erythromycin Sensitive <=0.5 mcg/mL Final    Moxifloxacin Sensitive <=0.5 mcg/mL Final    Oxacillin Sensitive 0.5 mcg/mL Final    Penicillin Resistant >8 mcg/mL Final    Tetracycline Sensitive <=4 mcg/mL Final    Trimeth/Sulfa Sensitive <=0.5/9.5 mcg/mL Final    Vancomycin Sensitive 2 mcg/mL Final                       ANAEROBIC CULTURE [[id number]] Collected:  03/19/17 1030    Order Status:  Completed Specimen Information:  Bone Updated:  03/22/17 1514     Significant Indicator NEG      Source BONE      Site Left Patella      Anaerobic Culture, Culture Res No Anaerobes isolated.     Narrative:      CALL  Anderson  131 tel. [phone],  CALLED  131 tel. [phone] 03/20/2017, 11:40, RB PERF. RESULTS CALLED  TO:[id number]()    BLOOD CULTURE [[id number]] Collected:  03/17/17 1242    Order Status:  Completed Specimen Information:  Blood from Peripheral Updated:  03/22/17 1500     Significant Indicator NEG      Source BLD      Site PERIPHERAL      Blood Culture No growth after 5 days of incubation.     Narrative:      Per Hospital Policy: Only change Specimen Src: to \"Line\" if  specified by physician order.    BLOOD CULTURE [[id number]] Collected:  03/17/17 1247    Order Status:  Completed Specimen Information:  Blood from Peripheral Updated:  03/22/17 1500     Significant Indicator " "NEG      Source BLD      Site PERIPHERAL      Blood Culture No growth after 5 days of incubation.     Narrative:      Per Hospital Policy: Only change Specimen Src: to \"Line\" if  specified by physician order.            Assessment:  Active Hospital Problems    Diagnosis   • *Fluid collection at surgical site [T88.8XXA]   • Staphylococcus aureus bacteremia [R78.81]   • Common bile duct dilatation [K83.8]   • Diabetes mellitus type II, controlled (CMS-HCC) [E11.9]   • Urinary incontinence with continuous leakage [N39.45]       Plan:  Left femur hardware infection with abscess  S/p removal 03/16/17-OR Cx - MSSA  S/p repeat I&D with liner exchange on 3/19 with abx bead placement  Cultures MSSA  Continue IV Cefazolin.   Anticipate 6 week course from 3/16/17, end date 4/27/17.    Left septic knee arthritis  S/p arthrotomy and drainage of knee with liner exchange 3/16  S/p repeat I&D as above.  Abx per above    MSSA bacteremia  3/16 Bcx - positive   3/17 Bcx - NGTD  Abx per above  YOMI negative    DM2  Optimize BS control to assist with wound healing, DM can worsen infection.    DW IM-for transfer to SNF soon    "

## 2017-03-26 NOTE — PROGRESS NOTES
Pain on LLE controlled with oxy 10 mg. Denies nausea. Denies SOB. Calls appropriately. Dressing clean, dry, and intact. Left pedal pulse +1. Denies numbing or tingling. Ambulates to the bathroom with standby assist using front wheel walker. Drinking and voiding. BM today per patient. Requested not to use the SCDs. Education provided- still refused.

## 2017-03-26 NOTE — PROGRESS NOTES
"   Orthopaedic Progress Note    Interval changes:  Splint removed and dressings changed incisions without issue  Hinged knee brace unlocked placed on LLE  Cleared for SNF DC pending acceptance    ROS - Patient denies any new issues.  Pain well controlled.    Blood pressure 126/57, pulse 71, temperature 36.5 °C (97.7 °F), temperature source Temporal, resp. rate 17, height 1.702 m (5' 7\"), weight 86.183 kg (190 lb), last menstrual period 10/01/1990, SpO2 95 %, not currently breastfeeding.      Patient seen and examined  No acute distress  Breathing non labored  RRR  LLE surgical dressing/ splint removed and dressings changed surgical incisions are well approximated and are dry and clean.  There is no erythema, induration, or signs of infection at any of the incision sites.  New dry dressing placed along with hinged knee brace unlocked. Patient clearly fires tibialis anterior, EHL, and gastrocnemius/soleus. Sensation is intact to light touch throughout superficial peroneal, deep peroneal, tibial, saphenous, and sural nerve distributions. Strong and palpable 2+ dorsalis pedis and posterior tibial pulses with capillary refill less than 2 seconds. No lower leg tenderness or discomfort.      Recent Labs      03/26/17   0415   WBC  10.2   RBC  2.35*   HEMOGLOBIN  6.9*   HEMATOCRIT  21.1*   MCV  89.8   MCH  29.4   MCHC  32.7*   RDW  46.8   PLATELETCT  361   MPV  10.4       Active Hospital Problems    Diagnosis   • Fluid collection at surgical site [T88.8XXA]     Priority: High   • Staphylococcus aureus bacteremia [R78.81]   • Fatty liver disease, nonalcoholic [K76.0]   • Dental caries [K02.9]   • Elevated LFTs [R79.89]   • Depression [F32.9]   • Vitamin D deficiency [E55.9]   • Diabetes mellitus type II, controlled (CMS-HCC) [E11.9]   • HTN (hypertension) [I10]   • HLD (hyperlipidemia) [E78.5]   • GERD (gastroesophageal reflux disease) [K21.9]   • Urinary incontinence with continuous leakage [N39.45] "       Assessment/Plan:  Splint removed hinged knee brace placed  Cleared for SNF placement by ortho  POD#7 S/P:  1.  Irrigation and debridement of left knee with polyethylene liner exchange.  2.  Irrigation and debridement of left lateral thigh.  3.  Insertion of antibiotic-impregnated calcium phosphate beads to left knee    and left thigh.  Wt bearing status - WBAT  Wound care/Drains - dressing in place until follow up  Future Procedures - none  Lovenox: Start 3/17, Duration-until ambulatory > 150'  Sutures/Staples out- 10-14 days post operatively  PT/OT-initiated  Antibiotics: ancef 2g IV Q8  DVT Prophylaxis- TEDS/SCDs/Foot pumps  Shannon-none  Case Coordination for Discharge Planning - Disposition SNF

## 2017-03-26 NOTE — PROGRESS NOTES
Hospital Medicine Progress Note, Adult, Complex               Author: Luis Hodges Date & Time created: 3/26/2017  8:25 AM     id- 67 y/o female LLE fluid colleciton, s/p arthrotomy, hardware removal and left knee hardware removal with liner exchange. Cx positive for MSSA.       Interval History:      awaiting acceptence for snf. tolerating abx. no other events. Pain controlled. Eating well.        Review of Systems:  Review of Systems   Constitutional: Negative for fever and chills.   HENT: Negative for sore throat.    Eyes: Negative for blurred vision and pain.   Respiratory: Negative for shortness of breath.    Cardiovascular: Negative for palpitations.   Gastrointestinal: Negative for nausea and abdominal pain.   Genitourinary: Negative for dysuria and urgency.   Musculoskeletal: Negative for back pain and neck pain.   Skin: Negative for itching and rash.   Neurological: Negative for dizziness, tingling and headaches.   Psychiatric/Behavioral: Negative for depression. The patient does not have insomnia.    All other systems reviewed and are negative.      Physical Exam:  Physical Exam   Constitutional: She is oriented to person, place, and time. She appears well-developed and well-nourished. No distress.   HENT:   Right Ear: External ear normal.   Left Ear: External ear normal.   Nose: Nose normal.   Mouth/Throat: No oropharyngeal exudate.   Eyes: Right eye exhibits no discharge. Left eye exhibits no discharge. No scleral icterus.   Neck: Normal range of motion. Neck supple. No JVD present.   Cardiovascular: Normal rate, regular rhythm, normal heart sounds and intact distal pulses.    No murmur heard.  Pulmonary/Chest: Effort normal and breath sounds normal. No stridor. No respiratory distress. She has no rales.   Abdominal: Soft. Bowel sounds are normal. She exhibits no distension. There is no tenderness.   Musculoskeletal: Normal range of motion. She exhibits edema and tenderness.   Multiple surgical scars,  bandages in place   Neurological: She is alert and oriented to person, place, and time. No cranial nerve deficit.   Skin: Skin is warm. No rash noted. She is diaphoretic. No erythema.   Psychiatric: She has a normal mood and affect. Her behavior is normal.   Nursing note and vitals reviewed.      Labs:        Invalid input(s): [id number]      Recent Labs      17   041   SODIUM  133*   POTASSIUM  3.9   CHLORIDE  101   CO2  25   BUN  7*   CREATININE  0.90   CALCIUM  9.4     Recent Labs      17   041   GLUCOSE  140*     Recent Labs      17   RBC  2.35*   HEMOGLOBIN  6.9*   HEMATOCRIT  21.1*   PLATELETCT  361     Recent Labs      17   WBC  10.2   NEUTSPOLYS  74.30*   LYMPHOCYTES  14.20*   MONOCYTES  6.50   EOSINOPHILS  2.70   BASOPHILS  0.30           Hemodynamics:  Temp (24hrs), Av °C (98.6 °F), Min:36.2 °C (97.1 °F), Max:37.5 °C (99.5 °F)  Temperature: 36.2 °C (97.1 °F)  Pulse  Av.6  Min: 53  Max: 97   Blood Pressure : 115/60 mmHg     Respiratory:    Respiration: 18, Pulse Oximetry: 98 %           Fluids:  No intake or output data in the 24 hours ending 17 0825     GI/Nutrition:  Orders Placed This Encounter   Procedures   • DIET ORDER     Standing Status: Standing      Number of Occurrences: 1      Standing Expiration Date:      Order Specific Question:  Diet:     Answer:  Diabetic [3]     Order Specific Question:  Diet:     Answer:  Low Fiber(GI Soft) [2]     Medical Decision Making, by Problem:  Active Hospital Problems    Diagnosis   • *Fluid collection at surgical site [T88.8XXA]-c/f infection, doing well  -s/p 3/16 hardware removal and arthrotomy and drainage of the left knee with liner exchange and I&D  -mssa in cx. abx per ID ongoing. ZOYA normal. .    • Fatty liver disease, nonalcoholic [K76.0]   • Dental caries [K02.9]-recent teeth extraction, zoya pending as above.    • Common bile duct dilatation [K83.8]-consistent with previous cholecystectomy.    •  Elevated LFTs [R79.89]-resolved. Normal workup.    • Depression [F32.9]-outpatient treatment, zoloft   • Vitamin D deficiency [E55.9]-replacement   • Diabetes mellitus type II, controlled (CMS-HCC) [E11.9]-ISS, adjust PRN   • HTN (hypertension) [I10]-well controlled, continue current meds   • HLD (hyperlipidemia) [E78.5]-statin   • GERD (gastroesophageal reflux disease) [K21.9]-PPI   • Urinary incontinence with continuous leakage [N39.45]-ditropan     Staph bacteremia-confirmed MSSA-abx per ID, recent dental work as above. YOMI normal. . end date 4/27/17.  Hypokalemia-replace prn      Anemia-iron deficiency-  . On replacement.   Mag - repleated  Low b12- continue replacement.     Discussed plan with RN  dispo- cleared to go to snf when bed available. For iv abx.     Labs reviewed, Medications reviewed and EKG reviewed  Shannon catheter: No Shannon        DVT prophylaxis - mechanical: SCDs      Assessed for rehab: Patient was assess for and/or received rehabilitation services during this hospitalization

## 2017-03-26 NOTE — PROGRESS NOTES
"Pt took the ace bandage off herself. Educated pt the need to have it on. Pt refused to put it back on as it makes it feel \"too tight\" on her. Offered to loosen it for her. Pt still refused.  "

## 2017-03-26 NOTE — PROGRESS NOTES
Updated Dr. Benz about labs this AM of HGB 6.9. Drop from 7.9 on 3/23 lab. No blood transfusion at this time per Dr. Benz.

## 2017-03-27 VITALS
RESPIRATION RATE: 15 BRPM | OXYGEN SATURATION: 95 % | DIASTOLIC BLOOD PRESSURE: 64 MMHG | BODY MASS INDEX: 29.82 KG/M2 | WEIGHT: 190 LBS | SYSTOLIC BLOOD PRESSURE: 102 MMHG | TEMPERATURE: 98.1 F | HEART RATE: 63 BPM | HEIGHT: 67 IN

## 2017-03-27 LAB
ANION GAP SERPL CALC-SCNC: 9 MMOL/L (ref 0–11.9)
BASOPHILS # BLD AUTO: 0.3 % (ref 0–1.8)
BASOPHILS # BLD: 0.03 K/UL (ref 0–0.12)
BUN SERPL-MCNC: 10 MG/DL (ref 8–22)
CALCIUM SERPL-MCNC: 8.9 MG/DL (ref 8.5–10.5)
CHLORIDE SERPL-SCNC: 100 MMOL/L (ref 96–112)
CO2 SERPL-SCNC: 24 MMOL/L (ref 20–33)
CREAT SERPL-MCNC: 0.94 MG/DL (ref 0.5–1.4)
EOSINOPHIL # BLD AUTO: 0.25 K/UL (ref 0–0.51)
EOSINOPHIL NFR BLD: 2.8 % (ref 0–6.9)
ERYTHROCYTE [DISTWIDTH] IN BLOOD BY AUTOMATED COUNT: 46.8 FL (ref 35.9–50)
GFR SERPL CREATININE-BSD FRML MDRD: 59 ML/MIN/1.73 M 2
GLUCOSE BLD-MCNC: 100 MG/DL (ref 65–99)
GLUCOSE BLD-MCNC: 121 MG/DL (ref 65–99)
GLUCOSE SERPL-MCNC: 137 MG/DL (ref 65–99)
HCT VFR BLD AUTO: 27.8 % (ref 37–47)
HGB BLD-MCNC: 9 G/DL (ref 12–16)
IMM GRANULOCYTES # BLD AUTO: 0.16 K/UL (ref 0–0.11)
IMM GRANULOCYTES NFR BLD AUTO: 1.8 % (ref 0–0.9)
LYMPHOCYTES # BLD AUTO: 1.44 K/UL (ref 1–4.8)
LYMPHOCYTES NFR BLD: 15.9 % (ref 22–41)
MCH RBC QN AUTO: 28.8 PG (ref 27–33)
MCHC RBC AUTO-ENTMCNC: 32.4 G/DL (ref 33.6–35)
MCV RBC AUTO: 89.1 FL (ref 81.4–97.8)
MONOCYTES # BLD AUTO: 0.63 K/UL (ref 0–0.85)
MONOCYTES NFR BLD AUTO: 7 % (ref 0–13.4)
NEUTROPHILS # BLD AUTO: 6.53 K/UL (ref 2–7.15)
NEUTROPHILS NFR BLD: 72.2 % (ref 44–72)
NRBC # BLD AUTO: 0.02 K/UL
NRBC BLD AUTO-RTO: 0.2 /100 WBC
PLATELET # BLD AUTO: 289 K/UL (ref 164–446)
PMV BLD AUTO: 10.4 FL (ref 9–12.9)
POTASSIUM SERPL-SCNC: 4.3 MMOL/L (ref 3.6–5.5)
RBC # BLD AUTO: 3.12 M/UL (ref 4.2–5.4)
SODIUM SERPL-SCNC: 133 MMOL/L (ref 135–145)
WBC # BLD AUTO: 9 K/UL (ref 4.8–10.8)

## 2017-03-27 PROCEDURE — 85025 COMPLETE CBC W/AUTO DIFF WBC: CPT

## 2017-03-27 PROCEDURE — 97110 THERAPEUTIC EXERCISES: CPT

## 2017-03-27 PROCEDURE — 700111 HCHG RX REV CODE 636 W/ 250 OVERRIDE (IP): Performed by: INTERNAL MEDICINE

## 2017-03-27 PROCEDURE — 700102 HCHG RX REV CODE 250 W/ 637 OVERRIDE(OP): Performed by: INTERNAL MEDICINE

## 2017-03-27 PROCEDURE — 99239 HOSP IP/OBS DSCHRG MGMT >30: CPT | Performed by: HOSPITALIST

## 2017-03-27 PROCEDURE — A9270 NON-COVERED ITEM OR SERVICE: HCPCS | Performed by: INTERNAL MEDICINE

## 2017-03-27 PROCEDURE — 97116 GAIT TRAINING THERAPY: CPT

## 2017-03-27 PROCEDURE — 700105 HCHG RX REV CODE 258

## 2017-03-27 PROCEDURE — 80048 BASIC METABOLIC PNL TOTAL CA: CPT

## 2017-03-27 PROCEDURE — 82962 GLUCOSE BLOOD TEST: CPT | Mod: 91

## 2017-03-27 PROCEDURE — 97530 THERAPEUTIC ACTIVITIES: CPT

## 2017-03-27 PROCEDURE — 700102 HCHG RX REV CODE 250 W/ 637 OVERRIDE(OP): Performed by: HOSPITALIST

## 2017-03-27 PROCEDURE — A9270 NON-COVERED ITEM OR SERVICE: HCPCS | Performed by: HOSPITALIST

## 2017-03-27 PROCEDURE — 700111 HCHG RX REV CODE 636 W/ 250 OVERRIDE (IP): Performed by: ORTHOPAEDIC SURGERY

## 2017-03-27 RX ORDER — SODIUM CHLORIDE 9 MG/ML
INJECTION, SOLUTION INTRAVENOUS
Status: COMPLETED
Start: 2017-03-27 | End: 2017-03-27

## 2017-03-27 RX ORDER — OXYCODONE HYDROCHLORIDE 5 MG/1
5 TABLET ORAL EVERY 4 HOURS PRN
Qty: 15 TAB | Refills: 0 | Status: SHIPPED | OUTPATIENT
Start: 2017-03-27 | End: 2017-07-24

## 2017-03-27 RX ADMIN — Medication 2 TABLET: at 10:11

## 2017-03-27 RX ADMIN — SERTRALINE 100 MG: 100 TABLET, FILM COATED ORAL at 10:08

## 2017-03-27 RX ADMIN — OXYCODONE HYDROCHLORIDE 10 MG: 10 TABLET ORAL at 06:30

## 2017-03-27 RX ADMIN — SODIUM CHLORIDE: 9 INJECTION, SOLUTION INTRAVENOUS at 00:15

## 2017-03-27 RX ADMIN — ENOXAPARIN SODIUM 40 MG: 100 INJECTION SUBCUTANEOUS at 10:12

## 2017-03-27 RX ADMIN — CYANOCOBALAMIN TAB 500 MCG 1000 MCG: 500 TAB at 10:10

## 2017-03-27 RX ADMIN — OMEPRAZOLE 20 MG: 20 CAPSULE, DELAYED RELEASE ORAL at 10:11

## 2017-03-27 RX ADMIN — SODIUM CHLORIDE 500 ML: 9 INJECTION, SOLUTION INTRAVENOUS at 00:09

## 2017-03-27 RX ADMIN — POTASSIUM CHLORIDE 30 MEQ: 750 TABLET, FILM COATED, EXTENDED RELEASE ORAL at 10:12

## 2017-03-27 RX ADMIN — FERROUS GLUCONATE 324 MG: 324 TABLET ORAL at 06:30

## 2017-03-27 RX ADMIN — CEFAZOLIN SODIUM 2 G: 2 INJECTION, SOLUTION INTRAVENOUS at 06:30

## 2017-03-27 RX ADMIN — LOSARTAN POTASSIUM 50 MG: 50 TABLET, FILM COATED ORAL at 10:11

## 2017-03-27 RX ADMIN — CEFAZOLIN SODIUM 2 G: 2 INJECTION, SOLUTION INTRAVENOUS at 00:10

## 2017-03-27 RX ADMIN — OXYCODONE HYDROCHLORIDE 10 MG: 10 TABLET ORAL at 00:08

## 2017-03-27 RX ADMIN — OXYCODONE HYDROCHLORIDE 10 MG: 10 TABLET ORAL at 11:21

## 2017-03-27 RX ADMIN — CEFAZOLIN SODIUM 2 G: 2 INJECTION, SOLUTION INTRAVENOUS at 13:32

## 2017-03-27 ASSESSMENT — ENCOUNTER SYMPTOMS
FEVER: 0
SHORTNESS OF BREATH: 0
NAUSEA: 0
ABDOMINAL PAIN: 0
VOMITING: 0
CHILLS: 0
HEADACHES: 0
COUGH: 0
DIARRHEA: 0

## 2017-03-27 ASSESSMENT — GAIT ASSESSMENTS
GAIT LEVEL OF ASSIST: STAND BY ASSIST
ASSISTIVE DEVICE: FRONT WHEEL WALKER
DISTANCE (FEET): 125
DEVIATION: DECREASED BASE OF SUPPORT;DECREASED HEEL STRIKE

## 2017-03-27 ASSESSMENT — PAIN SCALES - WONG BAKER: WONGBAKER_NUMERICALRESPONSE: HURTS A LITTLE MORE

## 2017-03-27 ASSESSMENT — PAIN SCALES - GENERAL
PAINLEVEL_OUTOF10: 5
PAINLEVEL_OUTOF10: 4
PAINLEVEL_OUTOF10: 6
PAINLEVEL_OUTOF10: 5

## 2017-03-27 ASSESSMENT — LIFESTYLE VARIABLES: DO YOU DRINK ALCOHOL: NO

## 2017-03-27 NOTE — CARE PLAN
Problem: Discharge Barriers/Planning  Goal: Patient’s continuum of care needs will be met  Discharge orders acknowledged, Pt aware and compliant with new orders, D/C teaching completed, Pt able to verbalize needs and complaint with discharge orders. Medication script given to transporter. IV removed. PICC in place. [facility]

## 2017-03-27 NOTE — DISCHARGE PLANNING
Medical Social Work    Discharge Plan: Pt is medically cleared to transfer to SNF today. Pt has been accepted to [facility]. Transport arranged for 1300 via [employer]. LOKI met with pt at bedside. Pt agreeable to transfer and signed cobra. LOKI notified the nurse of the transport. LOKI faxed over pt's D/C summary and copy of pain prescriptions to fax number [phone].    Presented pt with and had pt sign IMM letter. Documented in Flowsheet portion of the chart. Gave yellow carbon copy to pt and placed original pink copy in pt's chart.    Care Transition Team Final Discharge Disposition    Actual Discharge Information  Actual Discharge Date: 03/27/17  Care Transitions Team Assisting with Transportation: Yes  Method of Transportation: Van  Scheduled Transportation Date: 03/27/17  Scheduled Transportation Time: 1300  Van Company: [employer]

## 2017-03-27 NOTE — CARE PLAN
Problem: Pain Management  Goal: Pain level will decrease to patient’s comfort goal  Outcome: PROGRESSING AS EXPECTED  Pain controlled with oxy 10 mg.    Problem: Mobility  Goal: Risk for activity intolerance will decrease  Outcome: PROGRESSING AS EXPECTED  Ambulates to the bathroom standby assist.

## 2017-03-27 NOTE — PROGRESS NOTES
Walking in hallway  No c/o  Dressings dry  Swelling improved  AVSS  OK to d/c from ortho standpoint  F/u LAURENT 1 week  ABX

## 2017-03-27 NOTE — PROGRESS NOTES
Discharge orders acknowledged, Pt aware and compliant with new orders, D/C teaching completed, Pt able to verbalize needs and complaint with discharge orders. Medication script given to transporter. IV removed. PICC in place. [facility]

## 2017-03-27 NOTE — PROGRESS NOTES
Received shift report from amada RN and assumed care of this pt at 0715. Pt AOx 4 . Pt reports pain is 5/10 to LLE - Recently medicated prn per MAR. Pt is up OOB for breakfast and lunch With one assist, . Pt uses call light appropriately when needing assistance. Bed alarm was refused by pt, pt educated and compliant with using call light needing assistance. PICC assessed and is patent, CDI, running TKO IVF. Pt is on RA.  Discussed POC for day shift,  comfort, and safety. Patient has call light and personal belongings within reach. Safety and fall precautions in place. Reviewed orders, notes, labs, and test results. Hourly rounding in place with RN rounding on odd hours and CNA on even hours.

## 2017-03-27 NOTE — PROGRESS NOTES
Pain controlled with oxy 10 mg. Denies SOB. Denies nausea. Dressing to left leg clean, dry, and intact. Pt states the hinged brace is more comfortable. Pedal pulse +2. Denies numbing or tingling. Refused SCDs. Education provided. Ambulates to the bathroom standby assist using front wheel walker. Drinking and voiding. Currently sleeping.

## 2017-03-27 NOTE — DISCHARGE PLANNING
Received transportation request to transfer to [facility]. [facility] transport will transfer patient at 1300.  Job # [id number], spoke to Giovani. LOKI Sutton has been notified via voicemail.

## 2017-03-27 NOTE — PROGRESS NOTES
Infectious Disease Progress Note    Author: JIMMIE Roche DOS & Time created: 3/27/2017  4:18 PM    Chief Complaint:  Chief Complaint   Patient presents with   • Leg Pain     L   FU for left femoral hardware infection and left septic knee arthritis.  S/p I&D to L knee and thigh, with placement of abx beads to L knee and thigh on 3/19 with Dr. Irving.    Interval History:  3/18 AF, WBC 7.2, states her drains were removed yesterday, is willing to work with PT, pain stable and will return to the OR early next week, Bx - MSSA  3/19 AF, no CBC, plan to return to OR today for repeat I&D and liner exchange  3/20 Tm 99.7, WBC 8.1.  Pain is continuous and severe, 10/10 with movement and 6/10 at rest.  3/21- AF, no CBC.  Pain improved, down to 4/10 to L leg.  Would like to consider home infusion versus SNF.  3/22- Tm 100.4, WBC 5.9.  Left leg pain remains around 4/10.  Now ok going to a SNF, as long as it is in [place].  Tolerating abx without SE.  3/23- Tm 99.5, WBC 6.1.  Left leg pain continues around 4/10.  NPO, awaiting YOMI.  3/24 AF WBC not done YOMI neg Plan for SNF today-no new complaints  3/25 AF -difficulties with transfer-tolerating abx  3/26 AF trying to sleep-no clinical change from 3/25  3/27- AF, WBC 9.0.  Feeling good, no complaints.  Tolerating abx without SE.  Labs Reviewed, Medications Reviewed, Radiology Reviewed and Wound Reviewed.    Review of Systems:  Review of Systems   Constitutional: Negative for fever and chills.   Respiratory: Negative for cough and shortness of breath.    Cardiovascular: Negative for chest pain.   Gastrointestinal: Negative for nausea, vomiting, abdominal pain and diarrhea.   Genitourinary: Negative for dysuria.   Musculoskeletal: Positive for joint pain.        Minimal   Skin: Negative for rash.   Neurological: Negative for headaches.       Hemodynamics:  Temp (24hrs), Av.9 °C (98.5 °F), Min:36.7 °C (98.1 °F), Max:37.4 °C (99.3 °F)  Temperature: 36.7 °C (98.1  °F)  Pulse  Av.9  Min: 53  Max: 97   Blood Pressure : 102/64 mmHg       Physical Exam:  Physical Exam   Constitutional: She is oriented to person, place, and time. She appears well-developed and well-nourished. No distress.   Obese   HENT:   Head: Normocephalic and atraumatic.   Eyes: EOM are normal. Pupils are equal, round, and reactive to light.   Neck: Normal range of motion. Neck supple.   Cardiovascular: Normal rate, regular rhythm and normal heart sounds.    Pulmonary/Chest: Effort normal and breath sounds normal. No respiratory distress.   Abdominal: Soft. Bowel sounds are normal. She exhibits no distension. There is no tenderness.   Musculoskeletal: She exhibits tenderness.   L knne- Staples in place, well approximated, minimal surrounding erythema, no drainage, no odor.  Leg brace.  Toes warm.  RUE PICC- non tender, no erythema.   Neurological: She is alert and oriented to person, place, and time.   Skin: Skin is warm and dry. No erythema.   Nursing note and vitals reviewed.      Meds:    Current facility-administered medications:   •  ferrous gluconate (FERGON) tablet 324 mg, 324 mg, Oral, BID WITH MEALS, Luis Hodges M.D., 324 mg at 17 0630  •  cyanocobalamin (VITAMIN B-12) tablet 1,000 mcg, 1,000 mcg, Oral, DAILY, Luis Hodges M.D., 1,000 mcg at 17 1010  •  PICC Line Insertion has been implemented, , , Once **AND** May use Lidocaine 1% not to exceed 3 mls for local at insertion site, , , CONTINUOUS **AND** NOTIFY MD, , , Once **AND** Tip to dwell in the superior vena cava, , , CONTINUOUS **AND** Do not use PICC Line until placement verified by Chest X Ray, , , CONTINUOUS **AND** DX-CHEST-FOR PICC LINE Perform procedure in:: PICC Room, , , Once **AND** If radiologist reading of chest X-ray states any of the following the PICC should be used, , , CONTINUOUS **AND** Further evaluation of the PICC placement can be retrieved from X-Ray and Imaging, , , CONTINUOUS **AND** Blood draws  through PICC line; draws by RN only, , , CONTINUOUS **AND** FLUSHING GUIDELINES WHEN IN USE, , , CONTINUOUS **AND** normal saline PF 10-20 mL, 10-20 mL, Intravenous, PRN **AND** FLUSHING GUIDELINES WHEN NOT IN USE, , , CONTINUOUS **AND** DRESSING MAINTENANCE, , , Once **AND** Change needleless pressure ports and IV tubing every 72 hours per hospital policy, , , CONTINUOUS **AND** TUBING, , , CONTINUOUS **AND** If there is an MD order to remove the PICC line, any RN may remove the PICC line, , , CONTINUOUS **AND** [] PATIENT EDUCATION MATERIALS, , , Prior to discharge **AND** NURSING COMMUNICATION, , , CONTINUOUS, MOHINDER Roche.  •  potassium chloride ER (KLOR-CON) tablet 30 mEq, 30 mEq, Oral, BID, David Sylvester M.D., 30 mEq at 17 1012  •  tobramycin (NEBCIN) injection 1.2 g, 1.2 g, Other, Intra-Op Once PRN, Rigo Irving M.D.  •  hydrALAZINE (APRESOLINE) injection 10 mg, 10 mg, Intravenous, Q6HRS PRN, David Sylvester M.D., 10 mg at 17 1615  •  insulin lispro (HUMALOG) injection 1-6 Units, 1-6 Units, Subcutaneous, 4X/DAY ACHS, David Sylvester M.D., Stopped at 17 0700  •  ceFAZolin (ANCEF) IVPB 2 g, 2 g, Intravenous, Q8HRS, Bre Oconnor M.D., 2 g at 17 1332  •  oxycodone immediate-release (ROXICODONE) tablet 5 mg, 5 mg, Oral, Q4HRS PRN, 5 mg at 17 0852 **OR** oxycodone immediate release (ROXICODONE) tablet 10 mg, 10 mg, Oral, Q4HRS PRN, David Sylvester M.D., 10 mg at 17 1121  •  Action is required: Protocol 1073 Hypoglycemia has been implemented, , , Once **AND** Protocol 1073 Inclusion Criteria, , , CONTINUOUS **AND** Protocol 1073 NOTIFY, , , Once **AND** Protocol 1073 Initiate protocol immediately if FSBG is less than or equal to 70 mg/dL, , , CONTINUOUS **AND** glucose 4 g chewable tablet 16 g, 16 g, Oral, Q15 MIN PRN **AND** dextrose 50% (D50W) injection 25 mL, 25 mL, Intravenous, Q15 MIN PRN, David Sylvester M.D.  •  enoxaparin  (LOVENOX) inj 40 mg, 40 mg, Subcutaneous, DAILY, Rigo Irving M.D., 40 mg at 03/27/17 1012  •  losartan (COZAAR) tablet 50 mg, 50 mg, Oral, DAILY, Codi Singh M.D., 50 mg at 03/27/17 1011  •  omeprazole (PRILOSEC) capsule 20 mg, 20 mg, Oral, DAILY, Codi Singh M.D., 20 mg at 03/27/17 1011  •  oxybutynin (DITROPAN) tablet 10 mg, 10 mg, Oral, QHS, Codi Singh M.D., 10 mg at 03/26/17 1936  •  sertraline (ZOLOFT) tablet 100 mg, 100 mg, Oral, DAILY, Codi Singh M.D., 100 mg at 03/27/17 1008  •  trazodone (DESYREL) tablet 150 mg, 150 mg, Oral, QHS PRN, Codi Singh M.D., 150 mg at 03/26/17 1936  •  tramadol (ULTRAM) 50 MG tablet 50 mg, 50 mg, Oral, Q6HRS PRN, Codi Singh M.D., 50 mg at 03/24/17 0213  •  senna-docusate (PERICOLACE or SENOKOT S) 8.6-50 MG per tablet 2 Tab, 2 Tab, Oral, BID, 2 Tab at 03/27/17 1011 **AND** polyethylene glycol/lytes (MIRALAX) PACKET 1 Packet, 1 Packet, Oral, QDAY PRN, 1 Packet at 03/22/17 2040 **AND** magnesium hydroxide (MILK OF MAGNESIA) suspension 30 mL, 30 mL, Oral, QDAY PRN, 30 mL at 03/21/17 2133 **AND** bisacodyl (DULCOLAX) suppository 10 mg, 10 mg, Rectal, QDAY PRN, Codi Singh M.D., 10 mg at 03/23/17 0606  •  ondansetron (ZOFRAN) syringe/vial injection 4 mg, 4 mg, Intravenous, Q4HRS PRN, Codi Singh M.D.  •  ondansetron (ZOFRAN ODT) dispertab 4 mg, 4 mg, Oral, Q4HRS PRN, Codi Singh M.D.  •  morphine (pf) 4 mg/ml injection 2-4 mg, 2-4 mg, Intravenous, Q2HRS PRN, Codi iSngh M.D., 4 mg at 03/17/17 0849  •  NS infusion 2,586 mL, 30 mL/kg, Intravenous, Once PRN, Codi Singh M.D.    Current outpatient prescriptions:   •  oxycodone immediate-release (ROXICODONE) 5 MG Tab, Take 1 Tab by mouth every four hours as needed., Disp: 15 Tab, Rfl: 0  •  ceFAZolin (ANCEF) 2-4 GM/100ML-% Solution IVPB, 100 mL by Intravenous route every 8 hours., Disp: 100 mL, Rfl:   •  enoxaparin (LOVENOX) 40 MG/0.4ML Solution inj, Inject 40 mg as instructed every day.,  Disp: , Rfl:   •  ferrous gluconate (FERGON) 324 (38 FE) MG Tab, Take 1 Tab by mouth 2 times a day, with meals., Disp: 30 Tab, Rfl:   •  ondansetron (ZOFRAN ODT) 4 MG TABLET DISPERSIBLE, Take 1 Tab by mouth every four hours as needed for Nausea/Vomiting (give PO if IV route is unavailable. May give per feeding tube.)., Disp: 10 Tab, Rfl: 0  •  potassium chloride ER (KLOR-CON) 10 MEQ tablet, Take 3 Tabs by mouth 2 Times a Day., Disp: 60 Tab, Rfl: 3  •  tramadol (ULTRAM) 50 MG Tab, Take 1 Tab by mouth every 6 hours as needed for Mild Pain or Moderate Pain., Disp: 30 Tab, Rfl: 0  •  oxycodone-acetaminophen (PERCOCET-10)  MG Tab, Take 1-2 Tabs by mouth every four hours as needed for Severe Pain., Disp: , Rfl:   •  omeprazole (PRILOSEC) 20 MG delayed-release capsule, Take 1 Cap by mouth every day., Disp: 90 Cap, Rfl: 3  •  losartan (COZAAR) 50 MG Tab, Take 1 Tab by mouth every day., Disp: 90 Tab, Rfl: 1  •  oxybutynin (DITROPAN) 5 MG Tab, Take 2 Tabs by mouth every bedtime., Disp: 180 Tab, Rfl: 3  •  sertraline (ZOLOFT) 100 MG Tab, Take 1 Tab by mouth every day., Disp: 30 Tab, Rfl: 6  •  trazodone (DESYREL) 150 MG TABS, Take 1 Tab by mouth at bedtime as needed for Sleep., Disp: 90 Tab, Rfl: 3    Labs:  Recent Labs      03/26/17   0415  03/27/17   0035   WBC  10.2  9.0   RBC  2.35*  3.12*   HEMOGLOBIN  6.9*  9.0*   HEMATOCRIT  21.1*  27.8*   MCV  89.8  89.1   MCH  29.4  28.8   RDW  46.8  46.8   PLATELETCT  361  289   MPV  10.4  10.4   NEUTSPOLYS  74.30*  72.20*   LYMPHOCYTES  14.20*  15.90*   MONOCYTES  6.50  7.00   EOSINOPHILS  2.70  2.80   BASOPHILS  0.30  0.30     Recent Labs      03/26/17   0415  03/27/17   0035   SODIUM  133*  133*   POTASSIUM  3.9  4.3   CHLORIDE  101  100   CO2  25  24   GLUCOSE  140*  137*   BUN  7*  10     Recent Labs      03/26/17   0415  03/27/17   0035   CREATININE  0.90  0.94       Imaging:  Ct-extremity, Lower With Left  3/16/2017  1.  Limited assessment of left lateral thigh fluid  collection due to artifact secondary to hardware 2.  Fluid collection is at least 15 cm in length and probably subcutaneous in location 3.  Left hip arthroplasty, left knee arthroplasty, left femoral hardware present    Us-liver And Biliary Tree  3/16/2017  1.  Prior cholecystectomy 2.  Moderate biliary dilation with common bile duct measuring 1.5 cm 3.  Fatty infiltration of the liver     Dx-portable Fluoroscopy < 1 Hour    3/16/2017  3/16/2017 6:13 PM HISTORY/REASON FOR EXAM:  Left hip irrigation and debridement. Left lateral thigh fluid collection. Left hip arthroplasty. TECHNIQUE/EXAM DESCRIPTION AND NUMBER OF VIEWS:  1 views of the LEFT hip. COMPARISON: CT scan 3/16/2017 FINDINGS: Single fluoroscopic image demonstrates a needle projecting over the left hip arthroplasty laterally. Fluoroscopy time of 6 seconds  was utilized by THIEN CORREA for this procedure.     3/16/2017  Intraoperative fluoroscopic spot images as described above.    Le Venous Duplex (specify In Comments Left, Right Or Bilateral)   FINDINGS:  Left lower extremity -.  Complete color filling and compressibility with normal venous flow dynamics  including spontaneous flow, response to augmentation maneuvers, and  respiratory phasicity.  No superficial or deep venous thrombosis.  The peroneal and posterior tibial veins are difficult to assess for  compressibility, but flow response to augmentation is demonstrated.  Lateral left thigh at the area of concern: 17.3x5.9x3.5cm complex fluid  collection. This area is at a previous incision site.  Flow was evaluated in the contralateral common femoral vein and normal  venous flow dynamics including spontaneous flow, respiratory phasic  variation and augmentation were demonstrated.  Zander Cruz  (Electronically Signed)  Final Date:      16 March 2017                   00:09      Micro:  BLOOD CULTURE   Date Value Ref Range Status   03/17/2017 No growth after 5 days of incubation.  Final       Results     Procedure Component Value Units Date/Time    ANAEROBIC CULTURE [[id number]] Collected:  03/19/17 1040    Order Status:  Completed Specimen Information:  Tissue Updated:  03/24/17 1138     Significant Indicator NEG      Source TISS      Site Left Synovium      Anaerobic Culture, Culture Res No Anaerobes isolated.     CULTURE TISSUE W/ GRM STAIN [[id number]]  (Abnormal) Collected:  03/19/17 1040    Order Status:  Completed Specimen Information:  Tissue Updated:  03/24/17 1138     Gram Stain Result No organisms seen.      Significant Indicator POS (POS)      Source TISS      Site Left Synovium      Tissue Culture -- (A)      Result:        Growth noted after further incubation,see below for  organism identification.       Tissue Culture -- (A)      Result:        Staphylococcus aureus  Single colony  See previous culture for sensitivity report.      CULTURE WOUND W/ GRAM STAIN [[id number]]  (Abnormal) Collected:  03/19/17 1030    Order Status:  Completed Specimen Information:  Wound Updated:  03/22/17 1521     Gram Stain Result --      Result:        Many WBCs.  Rare Gram positive cocci.       Significant Indicator POS (POS)      Source WND      Site Left Knee Fluid      Culture Result Wound -- (A)      Culture Result Wound -- (A)      Result:        Staphylococcus aureus  Rare growth  See previous culture for sensitivity report.      Narrative:      CALL  Anderson  131 tel. [phone],  CALLED  131 tel. [phone] 03/20/2017, 11:40, RB PERF. RESULTS CALLED  TO:[id number] ()    ANAEROBIC CULTURE [[id number]] Collected:  03/19/17 1030    Order Status:  Completed Specimen Information:  Wound Updated:  03/22/17 1521     Significant Indicator NEG      Source WND      Site Left Knee Fluid      Anaerobic Culture, Culture Res No Anaerobes isolated.     Narrative:      CALL  Anderson  131 tel. [phone],  CALLED  131 tel. [phone] 03/20/2017, 11:40, RB PERF. RESULTS CALLED  TO:[id number] (SA)    CULTURE WOUND W/ GRAM STAIN  [[id number]]  (Abnormal) Collected:  03/19/17 1030    Order Status:  Completed Specimen Information:  Bone Updated:  03/22/17 1519     Gram Stain Result No organisms seen.      Significant Indicator POS (POS)      Source BONE      Site Left Femur      Culture Result Wound -- (A)      Result:        Growth noted after further incubation,see below for  organism identification.       Culture Result Wound -- (A)      Result:        Staphylococcus aureus  Single colony  See previous culture for sensitivity report.      ANAEROBIC CULTURE [[id number]] Collected:  03/19/17 1030    Order Status:  Completed Specimen Information:  Bone Updated:  03/22/17 1519     Significant Indicator NEG      Source BONE      Site Left Femur      Anaerobic Culture, Culture Res No Anaerobes isolated.     CULTURE TISSUE W/ GRM STAIN [[id number]]  (Abnormal) Collected:  03/19/17 1030    Order Status:  Completed Specimen Information:  Bone Updated:  03/22/17 1518     Gram Stain Result No organisms seen.      Significant Indicator POS (POS)      Source BONE      Site Left Tibia      Tissue Culture -- (A)      Result:        Growth noted after further incubation,see below for  organism identification.       Tissue Culture -- (A)      Result:        Staphylococcus aureus  Rare growth  See previous culture for sensitivity report.      ANAEROBIC CULTURE [[id number]] Collected:  03/19/17 1030    Order Status:  Completed Specimen Information:  Bone Updated:  03/22/17 1518     Significant Indicator NEG      Source BONE      Site Left Tibia      Anaerobic Culture, Culture Res No Anaerobes isolated.     CULTURE TISSUE W/ GRM STAIN [[id number]]  (Abnormal)  (Susceptibility) Collected:  03/19/17 1030    Order Status:  Completed Specimen Information:  Bone Updated:  03/22/17 1514     Significant Indicator POS (POS)      Source BONE      Site Left Patella      Tissue Culture -- (A)      Gram Stain Result No organisms seen.      Tissue Culture -- (A)      Result:     "    Staphylococcus aureus  Rare growth      Narrative:      CALL  Anderson  131 tel. [phone],  CALLED  131 tel. [phone] 03/20/2017, 11:40, RB PERF. RESULTS CALLED  TO:[phone]()    Culture & Susceptibility     STAPHYLOCOCCUS AUREUS     Antibiotic Sensitivity Microscan Unit Status    Ampicillin/sulbactam Sensitive <=8/4 mcg/mL Final    Clindamycin Sensitive <=0.5 mcg/mL Final    Daptomycin Sensitive <=0.5 mcg/mL Final    Erythromycin Sensitive <=0.5 mcg/mL Final    Moxifloxacin Sensitive <=0.5 mcg/mL Final    Oxacillin Sensitive 0.5 mcg/mL Final    Penicillin Resistant >8 mcg/mL Final    Tetracycline Sensitive <=4 mcg/mL Final    Trimeth/Sulfa Sensitive <=0.5/9.5 mcg/mL Final    Vancomycin Sensitive 2 mcg/mL Final                       ANAEROBIC CULTURE [[id number]] Collected:  03/19/17 1030    Order Status:  Completed Specimen Information:  Bone Updated:  03/22/17 1514     Significant Indicator NEG      Source BONE      Site Left Patella      Anaerobic Culture, Culture Res No Anaerobes isolated.     Narrative:      CALL  Anderson  131 tel. [phone],  CALLED  131 tel. [phone] 03/20/2017, 11:40, RB PERF. RESULTS CALLED  TO:[phone]()    BLOOD CULTURE [[id number]] Collected:  03/17/17 1242    Order Status:  Completed Specimen Information:  Blood from Peripheral Updated:  03/22/17 1500     Significant Indicator NEG      Source BLD      Site PERIPHERAL      Blood Culture No growth after 5 days of incubation.     Narrative:      Per Hospital Policy: Only change Specimen Src: to \"Line\" if  specified by physician order.    BLOOD CULTURE [[id number]] Collected:  03/17/17 1247    Order Status:  Completed Specimen Information:  Blood from Peripheral Updated:  03/22/17 1500     Significant Indicator NEG      Source BLD      Site PERIPHERAL      Blood Culture No growth after 5 days of incubation.     Narrative:      Per Hospital Policy: Only change Specimen Src: to \"Line\" if  specified by physician order.    "         Assessment:  Active Hospital Problems    Diagnosis   • *Fluid collection at surgical site [T88.8XXA]   • Staphylococcus aureus bacteremia [R78.81]   • Common bile duct dilatation [K83.8]   • Diabetes mellitus type II, controlled (CMS-HCC) [E11.9]   • Urinary incontinence with continuous leakage [N39.45]       Plan:  Left femur hardware infection with abscess  S/p removal 03/16/17-OR Cx - MSSA  S/p repeat I&D with liner exchange on 3/19 with abx bead placement  Cultures MSSA  Continue IV Cefazolin.   Anticipate 6 week course from 3/16/17, end date 4/27/17.    Left septic knee arthritis  S/p arthrotomy and drainage of knee with liner exchange 3/16  S/p repeat I&D as above.  Abx per above    MSSA bacteremia  3/16 Bcx - positive   3/17 Bcx - NGTD  Abx per above  YOMI negative    DM2  Optimize BS control to assist with wound healing, DM can worsen infection.    [facility]  FU in ID clinic.

## 2017-03-27 NOTE — DISCHARGE PLANNING
Medical Social Work    SW spoke with Sumi from [facility] at [phone] to check on status of SNF referral. Sumi reported that pt has been accepted and that they can accept the pt today.

## 2017-03-27 NOTE — THERAPY
"Pt is a 65y/o female s/p L hip Fx who developed an infection requiring arthrocentesis and arthrotomy to remove infected hardware. . Pt MOD I for med mob, SBA for transfers, on off toilet and gait w/FWW, Pt sat [facility] post Physical Therapy Treatment completed.   Bed Mobility:  Supine to Sit: Modified Independent (HOB 30* using handrail)  Transfers: Sit to Stand: Stand by Assist  Gait: Level Of Assist: Stand by Assist with Front-Wheel Walker       Plan of Care: Will benefit from Physical Therapy 3 times per week  Discharge Recommendations: Equipment: Front-Wheel Walker. Post-acute therapy Discharge to a transitional care facility for continued skilled therapy services.     See \"Rehab Therapy-Acute\" Patient Summary Report for complete documentation.       "

## 2017-04-07 ENCOUNTER — TELEPHONE (OUTPATIENT)
Dept: INFECTIOUS DISEASES | Facility: MEDICAL CENTER | Age: 67
End: 2017-04-07

## 2017-04-24 RX ORDER — LOSARTAN POTASSIUM 50 MG/1
TABLET ORAL
Qty: 90 TAB | Refills: 1 | OUTPATIENT
Start: 2017-04-24

## 2017-05-01 ENCOUNTER — OFFICE VISIT (OUTPATIENT)
Dept: INFECTIOUS DISEASES | Facility: MEDICAL CENTER | Age: 67
End: 2017-05-01
Payer: MEDICARE

## 2017-05-01 VITALS
TEMPERATURE: 97.9 F | RESPIRATION RATE: 16 BRPM | WEIGHT: 186 LBS | OXYGEN SATURATION: 92 % | HEART RATE: 85 BPM | HEIGHT: 67 IN | SYSTOLIC BLOOD PRESSURE: 102 MMHG | DIASTOLIC BLOOD PRESSURE: 58 MMHG | BODY MASS INDEX: 29.19 KG/M2

## 2017-05-01 DIAGNOSIS — A49.01 MSSA (METHICILLIN SUSCEPTIBLE STAPHYLOCOCCUS AUREUS) INFECTION: ICD-10-CM

## 2017-05-01 DIAGNOSIS — M00.062 STAPHYLOCOCCAL ARTHRITIS OF LEFT KNEE (HCC): ICD-10-CM

## 2017-05-01 DIAGNOSIS — E11.9 CONTROLLED TYPE 2 DIABETES MELLITUS WITHOUT COMPLICATION, UNSPECIFIED LONG TERM INSULIN USE STATUS: ICD-10-CM

## 2017-05-01 DIAGNOSIS — T84.59XD INFECTION OF TOTAL KNEE REPLACEMENT, SUBSEQUENT ENCOUNTER: ICD-10-CM

## 2017-05-01 DIAGNOSIS — A41.01 STAPHYLOCOCCUS AUREUS BACTEREMIA WITH SEPSIS (HCC): ICD-10-CM

## 2017-05-01 DIAGNOSIS — Z96.659 INFECTION OF TOTAL KNEE REPLACEMENT, SUBSEQUENT ENCOUNTER: ICD-10-CM

## 2017-05-01 PROCEDURE — G8432 DEP SCR NOT DOC, RNG: HCPCS | Performed by: NURSE PRACTITIONER

## 2017-05-01 PROCEDURE — 1036F TOBACCO NON-USER: CPT | Performed by: NURSE PRACTITIONER

## 2017-05-01 PROCEDURE — 1101F PT FALLS ASSESS-DOCD LE1/YR: CPT | Mod: 8P | Performed by: NURSE PRACTITIONER

## 2017-05-01 PROCEDURE — 99214 OFFICE O/P EST MOD 30 MIN: CPT | Performed by: NURSE PRACTITIONER

## 2017-05-01 PROCEDURE — G8419 CALC BMI OUT NRM PARAM NOF/U: HCPCS | Performed by: NURSE PRACTITIONER

## 2017-05-01 PROCEDURE — 3014F SCREEN MAMMO DOC REV: CPT | Mod: 8P | Performed by: NURSE PRACTITIONER

## 2017-05-01 PROCEDURE — 3046F HEMOGLOBIN A1C LEVEL >9.0%: CPT | Mod: 8P | Performed by: NURSE PRACTITIONER

## 2017-05-01 PROCEDURE — 4040F PNEUMOC VAC/ADMIN/RCVD: CPT | Mod: 8P | Performed by: NURSE PRACTITIONER

## 2017-05-01 RX ORDER — DOXYCYCLINE HYCLATE 100 MG
100 TABLET ORAL 2 TIMES DAILY
Qty: 60 TAB | Refills: 2 | Status: SHIPPED | OUTPATIENT
Start: 2017-05-01 | End: 2017-05-31

## 2017-05-01 NOTE — PROGRESS NOTES
[facility]    Subjective:     Chief Complaint   Patient presents with   • Follow-Up     hospital follow up/ Left femur hardware infection       Interval History: 66 y.o. Woman with a history of bilateral TKA, vulvular cancer s/p chemo XRT and resection and left leg surgery with Dr. Irving approximately 2 months ago.  Pt hospitalized from 3/15- 3/27/17, admitted on 3/15 for worsening LLE infection. She states she was doing well until she developed increasing left leg pain associated with swelling and erythema.  CT revealed a 15 cm fluid collection.  On 3/16 she underwent left arthrocentesis of the left knee and hip, irrigation and debridement of left thigh with removal of hardware from left femur and arthrotomy and drainage of left knee with polyethylene liner removal and reimplantation with insertion of antibiotic impregnated calcium phosphate absorbable beads to left thigh and knee on 3/16/17.  She underwent an additional I&D with linear exchange on 3/19 with abx bead placement with Dr. Irving.  OR cx and L knee aspirate + MSSA.  Bcx from 3/16 + MSSA.  Pt treated with IV Cefazolin, was discharged to [facility] on Cefazolin through 4/27.    Hospital records reviewed    Today, 5/1/2017: Patient left [facility] on 4/29.  She was not prescribed any abx upon discharge.  Pt was seen by Dr. Irving this morning who is happy with the progress of her knee, but feels that she should have been discharged on abx.  Pt states that Dr. Irving does not have any future plans for additional surgery to her L knee.  She reports feeling well and that she tolerated the IV Cefazolin without issue.  Denies feeling generally ill, fevers/chills, general malaise, headache, n/v/d, abdominal pain, chest pain or shortness of breath.  Pt feels her L knee wound has healed well, but notes that she is still weak.  Dr. Irving gave the pt an additional script for PT this morning.  Denies drainage, odor, or redness.  She does have  "some pain to her L knee, about 5/10, mostly on the lateral side.  She also notes some swelling to the knee.    ROS  As documented above in my HPI.    Past Medical History   Diagnosis Date   • Hypertension    • Diabetes    • GERD (gastroesophageal reflux disease)    • Urinary incontinence with continuous leakage    • Vulvar cancer (CMS-HCC)      had chemo and radiation and then surgery for recurrence, Dr. SOMMER   • Depression 7/21/2015   • Insomnia 7/21/2015   • Vitamin D deficiency 7/21/2015       Social History   Substance Use Topics   • Smoking status: Never Smoker    • Smokeless tobacco: None   • Alcohol Use: No       Allergies: Review of patient's allergies indicates no known allergies.    Pt's medication and problem list reviewed.     Objective:     PE:  /58 mmHg  Pulse 85  Temp(Src) 36.6 °C (97.9 °F)  Resp 16  Ht 1.702 m (5' 7\")  Wt 84.369 kg (186 lb)  BMI 29.12 kg/m2  SpO2 92%  LMP 10/01/1990    Vital signs reviewed    Constitutional: Appears well-developed and well-nourished. No acute distress.  Overweight.  Eyes: Conjunctivae normal and EOM are normal. Pupils are equal, round, and reactive to light.   ENMT: Poor dentition- missing teeth.  Neck: Trachea midline. Normal range of motion.   Respiratory: No respiratory distress, unlabored respiratory effort.  Lungs clear to auscultation bilaterally. No wheezes or rales.   Cardiovascular: Normal rate, regular rhythm, normal heart sounds. No murmur, gallop, or friction rub. No edema.  Abdomen: Soft, non tender, non-distended. BS + x 4. No masses or hepatosplenomegaly.   Musculoskeletal: Unsteady gait, cane for assistance.  Limited range of motion to L knee.    L knee- two well healed and approximated surgical scars to lateral and anterior aspects of knee, no openings or drainage, minimal swelling, no erythema.  RUE PICC- non tender, no erythema.  Skin: Warm and dry. Good turgor. No visible rashes or lesions.  Neurological: No cranial nerve deficit. " Coordination normal.  Decreased sensation around L knee.  Psychiatric: Alert and oriented to person, place, and time. Normal mood, calm affect.      Assessment and Plan:   The following treatment plan was discussed with patient at length:    1. Infection of total knee replacement, subsequent encounter  doxycycline (VIBRAMYCIN) 100 MG Tab    Finished IV Cefazolin on 4/28.  PICC d/c'd in clinic, covered with gauze and directed to keep covered for 24 hours.  Start PO Doxycycline 100mg BID for lifelong suppression.  No plans for future surgery to L knee to remove residual hardware.  Discussed dosing and side effects of Doxycycline being prescribed.  Pt instructed to call clinic with any adverse effects or to discontinue the medication and go to the ER should difficulty breathing, SOB, CP, facial swelling and/or gross rash/itching occurs.    2. Staphylococcal arthritis of left knee (CMS-[facility])  doxycycline (VIBRAMYCIN) 100 MG Tab    As above.   3. MSSA (methicillin susceptible Staphylococcus aureus) infection  doxycycline (VIBRAMYCIN) 100 MG Tab    As above.   4. Staphylococcus aureus bacteremia with sepsis (CMS-HCC)      Resolved.  IV Cefazolin completed.   5. Controlled type 2 diabetes mellitus without complication, unspecified long term insulin use status (CMS-HCC)      Continue to monitor blood sugars closely as DM will impair wound healing and can worsen infection.  Discussed diabetic foot care.  Advised to not go barefoot, check feet everyday and to check the inside of shoes before putting them on. Recommended pt to call the clinic immediately with any new and/or worsening foot injuries and/or signs of infection.       Follow up: 6-8 weeks, RTC sooner if needed. FU with PCP for ongoing chronic medical conditions.     MOHINDER Roche.    Case discussed with Dr. Oconnor.       >25 min spent face to face with patient, >50% of time spent counseling, coordinating care, reviewing records, discussing POC,  educating patient.

## 2017-05-01 NOTE — MR AVS SNAPSHOT
"        Shashank Longonett   2017 1:00 PM   Office Visit   MRN: [id number]    Department:  [facility]   Dept Phone:  [phone]    Description:  Female : 1950   Provider:  JIMMIE Roche           Reason for Visit     Follow-Up hospital follow up/ Left femur hardware infection      Allergies as of 2017     No Known Allergies      You were diagnosed with     Infection of total knee replacement, subsequent encounter   [699817]       Staphylococcal arthritis of left knee (CMS-HCC)   [308430]       MSSA (methicillin susceptible Staphylococcus aureus) infection   [502708]         Vital Signs     Blood Pressure Pulse Temperature Respirations Height Weight    102/58 mmHg 85 36.6 °C (97.9 °F) 16 1.702 m (5' 7\") 84.369 kg (186 lb)    Body Mass Index Oxygen Saturation Last Menstrual Period Smoking Status          29.12 kg/m2 92% 10/01/1990 Never Smoker         Basic Information     Date Of Birth Sex Race Ethnicity Preferred Language    1950 Female White Non- English      Problem List              ICD-10-CM Priority Class Noted - Resolved    Urinary incontinence with continuous leakage N39.45   Unknown - Present    Vulvar cancer (CMS-HCC) C51.9   Unknown - Present    Diabetes mellitus type II, controlled (CMS-HCC) E11.9   2014 - Present    HTN (hypertension) I10   2014 - Present    HLD (hyperlipidemia) E78.5   2014 - Present    GERD (gastroesophageal reflux disease) K21.9   2014 - Present    Chronic low back pain M54.5, G89.29   2014 - Present    Depression F32.9   2015 - Present    Insomnia G47.00   2015 - Present    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   2015 - Present    Vitamin D deficiency E55.9   2015 - Present    Fluid collection at surgical site T88.8XXA High  3/16/2017 - Present    Fatty liver disease, nonalcoholic K76.0   3/16/2017 - Present    Dental caries K02.9   3/16/2017 - Present    Elevated LFTs R79.89   3/16/2017 - " Present    Staphylococcus aureus bacteremia with sepsis (CMS-HCC) A41.01   3/17/2017 - Present    Staphylococcus aureus bacteremia R78.81   3/17/2017 - Present      Health Maintenance        Date Due Completion Dates    RETINAL SCREENING 10/3/1966 ---    URINE ACR / MICROALBUMIN 10/3/1966 ---    IMM DTaP/Tdap/Td Vaccine (1 - Tdap) 10/3/1967 ---    IMM ZOSTER VACCINE 10/3/2008 ---    IMM PNEUMOCOCCAL 65+ (ADULT) LOW/MEDIUM RISK SERIES (1 of 2 - PCV13) 10/3/2013 ---    MAMMOGRAM 8/9/2015 8/9/2014 (Prv Comp), 5/18/2006, 8/10/2004    Override on 8/9/2014: Previously completed    DIABETES MONOFILAMENT / LE EXAM 9/9/2015 9/9/2014 (N/S)    Override on 9/9/2014: (N/S)    A1C SCREENING 1/16/2016 7/16/2015    FASTING LIPID PROFILE 7/16/2016 7/16/2015    COLONOSCOPY 9/9/2017 9/9/2007 (Prv Comp)    Override on 9/9/2007: Previously completed    SERUM CREATININE 3/27/2018 3/27/2017, 3/26/2017, 3/23/2017, 3/22/2017, 3/21/2017, 3/20/2017, 3/18/2017, 3/17/2017, 3/15/2017, 1/17/2017, 7/16/2015, 10/10/2007, 10/4/2007, 10/2/2007, 9/29/2007, 9/26/2007, 9/12/2007, 12/29/2005, 12/22/2005, 8/31/2005, 8/12/2005    BONE DENSITY 8/9/2019 8/9/2014 (Prv Comp)    Override on 8/9/2014: Previously completed    PAP SMEAR 10/18/2019 10/18/2016, 6/9/2014 (Prv Comp)    Override on 6/9/2014: Previously completed (baldwin)            Current Immunizations     No immunizations on file.      Below and/or attached are the medications your provider expects you to take. Review all of your home medications and newly ordered medications with your provider and/or pharmacist. Follow medication instructions as directed by your provider and/or pharmacist. Please keep your medication list with you and share with your provider. Update the information when medications are discontinued, doses are changed, or new medications (including over-the-counter products) are added; and carry medication information at all times in the event of emergency situations     Allergies:  No  Known Allergies          Medications  Valid as of: May 01, 2017 -  1:38 PM    Generic Name Brand Name Tablet Size Instructions for use    Doxycycline Hyclate (Tab) VIBRAMYCIN 100 MG Take 1 Tab by mouth 2 times a day for 30 days.        Enoxaparin Sodium (Solution) LOVENOX 40 MG/0.4ML Inject 40 mg as instructed every day.        Ferrous Gluconate (Tab) FERGON 324 (38 FE) MG Take 1 Tab by mouth 2 times a day, with meals.        Losartan Potassium (Tab) COZAAR 50 MG Take 1 Tab by mouth every day.        Omeprazole (CAPSULE DELAYED RELEASE) PRILOSEC 20 MG Take 1 Cap by mouth every day.        Ondansetron (TABLET DISPERSIBLE) ZOFRAN ODT 4 MG Take 1 Tab by mouth every four hours as needed for Nausea/Vomiting (give PO if IV route is unavailable. May give per feeding tube.).        Oxybutynin Chloride (Tab) DITROPAN 5 MG Take 2 Tabs by mouth every bedtime.        OxyCODONE HCl (Tab) ROXICODONE 5 MG Take 1 Tab by mouth every four hours as needed.        Oxycodone-Acetaminophen (Tab) PERCOCET-10  MG Take 1-2 Tabs by mouth every four hours as needed for Severe Pain.        Potassium Chloride (Tab CR) KLOR-CON 10 MEQ Take 3 Tabs by mouth 2 Times a Day.        Sertraline HCl (Tab) ZOLOFT 100 MG Take 1 Tab by mouth every day.        TraMADol HCl (Tab) ULTRAM 50 MG Take 1 Tab by mouth every 6 hours as needed for Mild Pain or Moderate Pain.        TraZODone HCl (Tab) DESYREL 150 MG Take 1 Tab by mouth at bedtime as needed for Sleep.        .                 Medicines prescribed today were sent to:     [id number] [place] [place] - [address] [facility] [address]    [address] [facility] [facility] [address]    Phone: [phone] Fax: [phone]    Open 24 Hours?: No      Medication refill instructions:       If your prescription bottle indicates you have medication refills left, it is not necessary to call your provider’s office. Please contact your pharmacy and they will refill your  medication.    If your prescription bottle indicates you do not have any refills left, you may request refills at any time through one of the following ways: The online [employer] system (except Urgent Care), by calling your provider’s office, or by asking your pharmacy to contact your provider’s office with a refill request. Medication refills are processed only during regular business hours and may not be available until the next business day. Your provider may request additional information or to have a follow-up visit with you prior to refilling your medication.   *Please Note: Medication refills are assigned a new Rx number when refilled electronically. Your pharmacy may indicate that no refills were authorized even though a new prescription for the same medication is available at the pharmacy. Please request the medicine by name with the pharmacy before contacting your provider for a refill.        Other Notes About Your Plan     Last UDS: 9/9/2014  Controlled Substance Agreement Signed: 9/9/2014           [employer] Status: Patient Declined

## 2017-05-08 ENCOUNTER — OFFICE VISIT (OUTPATIENT)
Dept: MEDICAL GROUP | Facility: CLINIC | Age: 67
End: 2017-05-08
Payer: MEDICARE

## 2017-05-08 VITALS
WEIGHT: 189 LBS | SYSTOLIC BLOOD PRESSURE: 114 MMHG | HEIGHT: 64 IN | OXYGEN SATURATION: 96 % | TEMPERATURE: 98.4 F | DIASTOLIC BLOOD PRESSURE: 64 MMHG | HEART RATE: 59 BPM | RESPIRATION RATE: 16 BRPM | BODY MASS INDEX: 32.27 KG/M2

## 2017-05-08 DIAGNOSIS — A41.01 STAPHYLOCOCCUS AUREUS BACTEREMIA WITH SEPSIS (HCC): ICD-10-CM

## 2017-05-08 DIAGNOSIS — E11.9 CONTROLLED TYPE 2 DIABETES MELLITUS WITHOUT COMPLICATION, UNSPECIFIED LONG TERM INSULIN USE STATUS: ICD-10-CM

## 2017-05-08 DIAGNOSIS — I10 ESSENTIAL HYPERTENSION: ICD-10-CM

## 2017-05-08 PROCEDURE — 3014F SCREEN MAMMO DOC REV: CPT | Mod: 8P | Performed by: FAMILY MEDICINE

## 2017-05-08 PROCEDURE — 4040F PNEUMOC VAC/ADMIN/RCVD: CPT | Mod: 8P | Performed by: FAMILY MEDICINE

## 2017-05-08 PROCEDURE — 99214 OFFICE O/P EST MOD 30 MIN: CPT | Performed by: FAMILY MEDICINE

## 2017-05-08 PROCEDURE — G8432 DEP SCR NOT DOC, RNG: HCPCS | Performed by: FAMILY MEDICINE

## 2017-05-08 PROCEDURE — 1101F PT FALLS ASSESS-DOCD LE1/YR: CPT | Performed by: FAMILY MEDICINE

## 2017-05-08 PROCEDURE — 3046F HEMOGLOBIN A1C LEVEL >9.0%: CPT | Mod: 8P | Performed by: FAMILY MEDICINE

## 2017-05-08 PROCEDURE — 1036F TOBACCO NON-USER: CPT | Performed by: FAMILY MEDICINE

## 2017-05-08 PROCEDURE — G8419 CALC BMI OUT NRM PARAM NOF/U: HCPCS | Performed by: FAMILY MEDICINE

## 2017-05-08 ASSESSMENT — ENCOUNTER SYMPTOMS
CONSTIPATION: 0
RESPIRATORY NEGATIVE: 1
HEADACHES: 0
MYALGIAS: 0
FEVER: 0
DIZZINESS: 0
NECK PAIN: 0
EYES NEGATIVE: 1
CHILLS: 0
COUGH: 0
HEMOPTYSIS: 0
CARDIOVASCULAR NEGATIVE: 1
PSYCHIATRIC NEGATIVE: 1
PALPITATIONS: 0
CONSTITUTIONAL NEGATIVE: 1
NEUROLOGICAL NEGATIVE: 1
GASTROINTESTINAL NEGATIVE: 1

## 2017-05-08 ASSESSMENT — PAIN SCALES - GENERAL: PAINLEVEL: 8=MODERATE-SEVERE PAIN

## 2017-05-08 ASSESSMENT — PATIENT HEALTH QUESTIONNAIRE - PHQ9: CLINICAL INTERPRETATION OF PHQ2 SCORE: 0

## 2017-05-08 NOTE — PROGRESS NOTES
Subjective:      Shashank Christian is a 66 y.o. female who presents with Follow-Up and Medication Refill            HPI Comments: 1. Controlled type 2 diabetes mellitus without complication, unspecified long term insulin use status (CMS-HCC)  Recent bs in hospital ok but needs full labs  - MICROALBUMIN 24 HR URINE; Future  - LIPID PROFILE; Future  - HEMOGLOBIN A1C; Future  - COMP METABOLIC PANEL; Future  - CBC WITHOUT DIFFERENTIAL; Future    2. Staphylococcus aureus bacteremia with sepsis (CMS-HCC)  Recent admit for infection in left knee. Seen by ID and will take doxycycline bid for the rest of her life for suppression  Has f/u with both ortho and id soon, doing ok    3. Essential hypertension  Currently treated for HTN, taking meds with no CP or sob, monitors bp at home periodically. controlled        Past Medical History:    Hypertension                                                  Diabetes                                                      GERD (gastroesophageal reflux disease)                        Urinary incontinence with continuous leakage                  Vulvar cancer (CMS-HCC)                                         Comment:had chemo and radiation and then surgery for                recurrence, Dr. SOMMER    Depression                                      7/21/2015     Insomnia                                        7/21/2015     Vitamin D deficiency                            7/21/2015   Past Surgical History:    ABDOMINAL EXPLORATION                                          IRRIGATION & DEBRIDEMENT ORTHO                  Left 3/16/2017       Comment:Procedure: IRRIGATION & DEBRIDEMENT ORTHO-THIGH               AND KNEE;  Surgeon: Rigo Irving M.D.;                 Location: SURGERY [facility];  Service:     HARDWARE REMOVAL ORTHO                          Left 3/16/2017       Comment:Procedure: HARDWARE REMOVAL ORTHO-THIGH;                 Surgeon: Rigo Irving M.D.;  Location:                 SURGERY [facility];  Service:     IRRIGATION & DEBRIDEMENT ORTHO                  Left 3/19/2017       Comment:Procedure: IRRIGATION & DEBRIDEMENT ORTHO KNEE;               Surgeon: Rigo Irving M.D.;  Location:                SURGERY [facility];  Service:     KNEE REVISION TOTAL                              3/19/2017       Comment:Procedure: KNEE REVISION TOTAL FOR POLYETHYLENE               LINER EXCHANGE;  Surgeon: Rigo Irivng M.D.;  Location: SURGERY [facility];                 Service:     Smoking Status: Never Smoker                      Smokeless Status: Never Used                        Alcohol Use: No              Review of patient's family history indicates:    Cancer                         Mother                    Heart Disease                  Father                      Current outpatient prescriptions: •  doxycycline (VIBRAMYCIN) 100 MG Tab, Take 1 Tab by mouth 2 times a day for 30 days., Disp: 60 Tab, Rfl: 2•  oxycodone immediate-release (ROXICODONE) 5 MG Tab, Take 1 Tab by mouth every four hours as needed., Disp: 15 Tab, Rfl: 0•  tramadol (ULTRAM) 50 MG Tab, Take 1 Tab by mouth every 6 hours as needed for Mild Pain or Moderate Pain., Disp: 30 Tab, Rfl: 0•  omeprazole (PRILOSEC) 20 MG delayed-release capsule, Take 1 Cap by mouth every day., Disp: 90 Cap, Rfl: 3•  losartan (COZAAR) 50 MG Tab, Take 1 Tab by mouth every day., Disp: 90 Tab, Rfl: 1•  oxybutynin (DITROPAN) 5 MG Tab, Take 2 Tabs by mouth every bedtime., Disp: 180 Tab, Rfl: 3•  sertraline (ZOLOFT) 100 MG Tab, Take 1 Tab by mouth every day., Disp: 30 Tab, Rfl: 6•  trazodone (DESYREL) 150 MG TABS, Take 1 Tab by mouth at bedtime as needed for Sleep., Disp: 90 Tab, Rfl: 3•  enoxaparin (LOVENOX) 40 MG/0.4ML Solution inj, Inject 40 mg as instructed every day., Disp: , Rfl: •  ferrous gluconate (FERGON) 324 (38 FE) MG Tab, Take 1 Tab by mouth 2 times a day, with meals. (Patient not taking:  Reported on 5/8/2017), Disp: 30 Tab, Rfl: •  ondansetron (ZOFRAN ODT) 4 MG TABLET DISPERSIBLE, Take 1 Tab by mouth every four hours as needed for Nausea/Vomiting (give PO if IV route is unavailable. May give per feeding tube.)., Disp: 10 Tab, Rfl: 0•  potassium chloride ER (KLOR-CON) 10 MEQ tablet, Take 3 Tabs by mouth 2 Times a Day. (Patient not taking: Reported on 5/8/2017), Disp: 60 Tab, Rfl: 3•  oxycodone-acetaminophen (PERCOCET-10)  MG Tab, Take 1-2 Tabs by mouth every four hours as needed for Severe Pain., Disp: , Rfl:           Review of Systems   Constitutional: Negative.  Negative for fever and chills.        Past Medical History:    Hypertension                                                  Diabetes                                                      GERD (gastroesophageal reflux disease)                        Urinary incontinence with continuous leakage                  Vulvar cancer (CMS-[facility])                                         Comment:had chemo and radiation and then surgery for                recurrence, Dr. SOMMER    Depression                                      7/21/2015     Insomnia                                        7/21/2015     Vitamin D deficiency                            7/21/2015   Past Surgical History:    ABDOMINAL EXPLORATION                                          IRRIGATION & DEBRIDEMENT ORTHO                  Left 3/16/2017       Comment:Procedure: IRRIGATION & DEBRIDEMENT ORTHO-THIGH               AND KNEE;  Surgeon: Rigo Irving M.D.;                 Location: SURGERY [facility];  Service:     HARDWARE REMOVAL ORTHO                          Left 3/16/2017       Comment:Procedure: HARDWARE REMOVAL ORTHO-THIGH;                 Surgeon: Rigo Irving M.D.;  Location:                SURGERY [facility];  Service:     IRRIGATION & DEBRIDEMENT ORTHO                  Left 3/19/2017       Comment:Procedure: IRRIGATION & DEBRIDEMENT ORTHO KNEE;              "  Surgeon: Rigo Irving M.D.;  Location:                SURGERY [facility];  Service:     KNEE REVISION TOTAL                              3/19/2017       Comment:Procedure: KNEE REVISION TOTAL FOR POLYETHYLENE               LINER EXCHANGE;  Surgeon: Rigo Irving M.D.;  Location: SURGERY [facility];                 Service:     Smoking Status: Never Smoker                      Smokeless Status: Never Used                        Alcohol Use: No              Review of patient's family history indicates:    Cancer                         Mother                    Heart Disease                  Father                     HENT: Negative.    Eyes: Negative.    Respiratory: Negative.  Negative for cough and hemoptysis.    Cardiovascular: Negative.  Negative for chest pain and palpitations.   Gastrointestinal: Negative.  Negative for constipation.   Genitourinary: Negative.  Negative for dysuria and urgency.   Musculoskeletal: Positive for joint pain. Negative for myalgias and neck pain.   Skin: Negative.  Negative for rash.   Neurological: Negative.  Negative for dizziness and headaches.   Endo/Heme/Allergies: Negative.    Psychiatric/Behavioral: Negative.  Negative for suicidal ideas.          Objective:     /64 mmHg  Pulse 59  Temp(Src) 36.9 °C (98.4 °F)  Resp 16  Ht 1.626 m (5' 4\")  Wt 85.73 kg (189 lb)  BMI 32.43 kg/m2  SpO2 96%  LMP 10/01/1990     Physical Exam   Constitutional: She is oriented to person, place, and time. No distress.   HENT:   Head: Normocephalic and atraumatic.   Right Ear: External ear normal.   Left Ear: External ear normal.   Nose: Nose normal.   Mouth/Throat: Oropharynx is clear and moist. No oropharyngeal exudate.   Eyes: Pupils are equal, round, and reactive to light. Right eye exhibits no discharge. Left eye exhibits no discharge. No scleral icterus.   Neck: Normal range of motion. Neck supple. No JVD present. No tracheal deviation present. No " thyromegaly present.   Cardiovascular: Normal rate, regular rhythm, normal heart sounds and intact distal pulses.  Exam reveals no gallop and no friction rub.    No murmur heard.  Pulmonary/Chest: Effort normal and breath sounds normal. No stridor. No respiratory distress. She has no wheezes. She has no rales. She exhibits no tenderness.   Abdominal: Soft. She exhibits no distension. There is no tenderness.   Lymphadenopathy:     She has no cervical adenopathy.   Neurological: She is alert and oriented to person, place, and time.   Skin: Skin is warm and dry. She is not diaphoretic.   Well healed surgical scars on left knee with no signs of infection currently   Psychiatric: Judgment normal.   Nursing note and vitals reviewed.              Assessment/Plan:     1. Controlled type 2 diabetes mellitus without complication, unspecified long term insulin use status (CMS-HCC)    - MICROALBUMIN 24 HR URINE; Future  - LIPID PROFILE; Future  - HEMOGLOBIN A1C; Future  - COMP METABOLIC PANEL; Future  - CBC WITHOUT DIFFERENTIAL; Future    2. Staphylococcus aureus bacteremia with sepsis (CMS-HCC)      3. Essential hypertension

## 2017-05-08 NOTE — MR AVS SNAPSHOT
"        Shashank Christian   2017 3:30 PM   Office Visit   MRN: [id number]    [facility] Phone:  [phone]    Description:  Female : 1950   Provider:  Zeb Burdick M.D.           Reason for Visit     Follow-Up [facility]     Medication Refill zoloft,trazadone      Allergies as of 2017     No Known Allergies      You were diagnosed with     Controlled type 2 diabetes mellitus without complication, unspecified long term insulin use status (CMS-HCC)   [3325968]       Staphylococcus aureus bacteremia with sepsis (CMS-HCC)   [710962]       Essential hypertension   [7251376]         Vital Signs     Blood Pressure Pulse Temperature Respirations Height Weight    114/64 mmHg 59 36.9 °C (98.4 °F) 16 1.626 m (5' 4\") 85.73 kg (189 lb)    Body Mass Index Oxygen Saturation Last Menstrual Period Smoking Status          32.43 kg/m2 96% 10/01/1990 Never Smoker         Basic Information     Date Of Birth Sex Race Ethnicity Preferred Language    1950 Female White Non- English      Your appointments     2017 11:30 AM   Follow Up Visit with JIMMIE Roche   [address])    [address] [place] [id number] [phone]           You will be receiving a confirmation call a few days before your appointment from our automated call confirmation system.              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   7/21/2015 - Present    Vitamin D deficiency E55.9   7/21/2015 - Present    Fluid collection at surgical site T88.8XXA High  3/16/2017 - Present    Fatty liver disease, nonalcoholic K76.0   3/16/2017 - Present    Dental caries K02.9   3/16/2017 - Present    Elevated LFTs R94.5   3/16/2017 - Present    Staphylococcus aureus bacteremia with sepsis (CMS-HCC) A41.01   3/17/2017 - Present    Staphylococcus aureus bacteremia R78.81   3/17/2017 - Present      Health Maintenance        Date Due Completion Dates    RETINAL SCREENING 10/3/1966 ---    URINE ACR / MICROALBUMIN 10/3/1966 ---    IMM DTaP/Tdap/Td Vaccine (1 - Tdap) 10/3/1967 ---    IMM ZOSTER VACCINE 10/3/2008 ---    IMM PNEUMOCOCCAL 65+ (ADULT) LOW/MEDIUM RISK SERIES (1 of 2 - PCV13) 10/3/2013 ---    MAMMOGRAM 8/9/2015 8/9/2014 (Prv Comp), 5/18/2006, 8/10/2004    Override on 8/9/2014: Previously completed    A1C SCREENING 1/16/2016 7/16/2015    FASTING LIPID PROFILE 7/16/2016 7/16/2015    COLONOSCOPY 9/9/2017 9/9/2007 (Prv Comp)    Override on 9/9/2007: Previously completed    SERUM CREATININE 3/27/2018 3/27/2017, 3/26/2017, 3/23/2017, 3/22/2017, 3/21/2017, 3/20/2017, 3/18/2017, 3/17/2017, 3/15/2017, 1/17/2017, 7/16/2015, 10/10/2007, 10/4/2007, 10/2/2007, 9/29/2007, 9/26/2007, 9/12/2007, 12/29/2005, 12/22/2005, 8/31/2005, 8/12/2005    DIABETES MONOFILAMENT / LE EXAM 5/8/2018 5/8/2017 (Done), 9/9/2014 (N/S)    Override on 5/8/2017: Done    Override on 9/9/2014: (N/S)    BONE DENSITY 8/9/2019 8/9/2014 (Prv Comp)    Override on 8/9/2014: Previously completed    PAP SMEAR 10/18/2019 10/18/2016, 6/9/2014 (Prv Comp)    Override on 6/9/2014: Previously completed (baldwin)            Current Immunizations     No immunizations on file.      Below and/or attached are the medications your provider expects you to take. Review all of your home medications and newly ordered medications with your provider and/or  pharmacist. Follow medication instructions as directed by your provider and/or pharmacist. Please keep your medication list with you and share with your provider. Update the information when medications are discontinued, doses are changed, or new medications (including over-the-counter products) are added; and carry medication information at all times in the event of emergency situations     Allergies:  No Known Allergies          Medications  Valid as of: May 08, 2017 -  4:48 PM    Generic Name Brand Name Tablet Size Instructions for use    Doxycycline Hyclate (Tab) VIBRAMYCIN 100 MG Take 1 Tab by mouth 2 times a day for 30 days.        Enoxaparin Sodium (Solution) LOVENOX 40 MG/0.4ML Inject 40 mg as instructed every day.        Ferrous Gluconate (Tab) FERGON 324 (38 FE) MG Take 1 Tab by mouth 2 times a day, with meals.        Losartan Potassium (Tab) COZAAR 50 MG Take 1 Tab by mouth every day.        Omeprazole (CAPSULE DELAYED RELEASE) PRILOSEC 20 MG Take 1 Cap by mouth every day.        Ondansetron (TABLET DISPERSIBLE) ZOFRAN ODT 4 MG Take 1 Tab by mouth every four hours as needed for Nausea/Vomiting (give PO if IV route is unavailable. May give per feeding tube.).        Oxybutynin Chloride (Tab) DITROPAN 5 MG Take 2 Tabs by mouth every bedtime.        OxyCODONE HCl (Tab) ROXICODONE 5 MG Take 1 Tab by mouth every four hours as needed.        Oxycodone-Acetaminophen (Tab) PERCOCET-10  MG Take 1-2 Tabs by mouth every four hours as needed for Severe Pain.        Potassium Chloride (Tab CR) KLOR-CON 10 MEQ Take 3 Tabs by mouth 2 Times a Day.        Sertraline HCl (Tab) ZOLOFT 100 MG Take 1 Tab by mouth every day.        TraMADol HCl (Tab) ULTRAM 50 MG Take 1 Tab by mouth every 6 hours as needed for Mild Pain or Moderate Pain.        TraZODone HCl (Tab) DESYREL 150 MG Take 1 Tab by mouth at bedtime as needed for Sleep.        .                 Medicines prescribed today were sent to:     [facility]  [id number] - [place], [place] - [address] [facility] [address] [facility] [address] [facility]    [address] [facility] [address] [place] [address]    Phone: [phone] Fax: [phone]    Open 24 Hours?: No      Medication refill instructions:       If your prescription bottle indicates you have medication refills left, it is not necessary to call your provider’s office. Please contact your pharmacy and they will refill your medication.    If your prescription bottle indicates you do not have any refills left, you may request refills at any time through one of the following ways: The online [employer] system (except Urgent Care), by calling your provider’s office, or by asking your pharmacy to contact your provider’s office with a refill request. Medication refills are processed only during regular business hours and may not be available until the next business day. Your provider may request additional information or to have a follow-up visit with you prior to refilling your medication.   *Please Note: Medication refills are assigned a new Rx number when refilled electronically. Your pharmacy may indicate that no refills were authorized even though a new prescription for the same medication is available at the pharmacy. Please request the medicine by name with the pharmacy before contacting your provider for a refill.        Your To Do List     Future Labs/Procedures Complete By Expires    CBC WITHOUT DIFFERENTIAL  As directed 11/8/2017    COMP METABOLIC PANEL  As directed 5/8/2018    HEMOGLOBIN A1C  As directed 5/8/2018    LIPID PROFILE  As directed 5/8/2018    MICROALBUMIN 24 HR URINE  As directed 5/8/2018      Other Notes About Your Plan     Last UDS: 9/9/2014  Controlled Substance Agreement Signed: 9/9/2014           [employer] Status: Patient Declined

## 2017-05-18 RX ORDER — TRAZODONE HYDROCHLORIDE 150 MG/1
150 TABLET ORAL
Qty: 90 TAB | Refills: 3 | Status: SHIPPED | OUTPATIENT
Start: 2017-05-18 | End: 2018-07-19

## 2017-05-18 NOTE — TELEPHONE ENCOUNTER
Was the patient seen in the last year in this department? Yes     Does patient have an active prescription for medications requested? Yes     Received Request Via: Pharmacy       Last office visit 05/08/17  Last labs 03/27/17

## 2017-05-30 ENCOUNTER — HOSPITAL ENCOUNTER (OUTPATIENT)
Dept: LAB | Facility: MEDICAL CENTER | Age: 67
End: 2017-05-30
Attending: ORTHOPAEDIC SURGERY
Payer: MEDICARE

## 2017-05-30 LAB
CRP SERPL HS-MCNC: 2 MG/L (ref 0–7.5)
ERYTHROCYTE [SEDIMENTATION RATE] IN BLOOD BY WESTERGREN METHOD: 36 MM/HOUR (ref 0–30)

## 2017-05-30 PROCEDURE — 85652 RBC SED RATE AUTOMATED: CPT

## 2017-05-30 PROCEDURE — 36415 COLL VENOUS BLD VENIPUNCTURE: CPT

## 2017-05-30 PROCEDURE — 86141 C-REACTIVE PROTEIN HS: CPT | Mod: GA

## 2017-06-27 ENCOUNTER — HOSPITAL ENCOUNTER (OUTPATIENT)
Facility: MEDICAL CENTER | Age: 67
End: 2017-06-27
Attending: SPECIALIST
Payer: MEDICARE

## 2017-06-27 PROCEDURE — 88175 CYTOPATH C/V AUTO FLUID REDO: CPT

## 2017-06-28 LAB — CYTOLOGY REG CYTOL: NORMAL

## 2017-06-29 RX ORDER — LOSARTAN POTASSIUM 50 MG/1
TABLET ORAL
Qty: 90 TAB | Refills: 0 | Status: SHIPPED | OUTPATIENT
Start: 2017-06-29 | End: 2017-12-09 | Stop reason: SDUPTHER

## 2017-07-15 ENCOUNTER — OFFICE VISIT (OUTPATIENT)
Dept: MEDICAL GROUP | Facility: CLINIC | Age: 67
End: 2017-07-15
Payer: MEDICARE

## 2017-07-15 ENCOUNTER — TELEPHONE (OUTPATIENT)
Dept: MEDICAL GROUP | Facility: CLINIC | Age: 67
End: 2017-07-15

## 2017-07-15 ENCOUNTER — HOSPITAL ENCOUNTER (OUTPATIENT)
Facility: MEDICAL CENTER | Age: 67
End: 2017-07-15
Attending: PHYSICIAN ASSISTANT
Payer: MEDICARE

## 2017-07-15 VITALS
TEMPERATURE: 97.9 F | HEIGHT: 64 IN | HEART RATE: 60 BPM | WEIGHT: 189 LBS | DIASTOLIC BLOOD PRESSURE: 62 MMHG | SYSTOLIC BLOOD PRESSURE: 120 MMHG | RESPIRATION RATE: 16 BRPM | BODY MASS INDEX: 32.27 KG/M2 | OXYGEN SATURATION: 94 %

## 2017-07-15 DIAGNOSIS — N30.01 ACUTE CYSTITIS WITH HEMATURIA: ICD-10-CM

## 2017-07-15 DIAGNOSIS — R30.0 DYSURIA: ICD-10-CM

## 2017-07-15 DIAGNOSIS — R35.89 POLYURIA: ICD-10-CM

## 2017-07-15 LAB
APPEARANCE UR: NORMAL
BILIRUB UR STRIP-MCNC: NORMAL MG/DL
COLOR UR AUTO: YELLOW
GLUCOSE UR STRIP.AUTO-MCNC: NORMAL MG/DL
KETONES UR STRIP.AUTO-MCNC: NORMAL MG/DL
LEUKOCYTE ESTERASE UR QL STRIP.AUTO: NORMAL
NITRITE UR QL STRIP.AUTO: NORMAL
PH UR STRIP.AUTO: NORMAL [PH] (ref 5–8)
PROT UR QL STRIP: 305 MG/DL
RBC UR QL AUTO: 1.01
SP GR UR STRIP.AUTO: NORMAL
UROBILINOGEN UR STRIP-MCNC: NORMAL MG/DL

## 2017-07-15 PROCEDURE — 87086 URINE CULTURE/COLONY COUNT: CPT

## 2017-07-15 PROCEDURE — 99203 OFFICE O/P NEW LOW 30 MIN: CPT | Performed by: PHYSICIAN ASSISTANT

## 2017-07-15 PROCEDURE — 81002 URINALYSIS NONAUTO W/O SCOPE: CPT | Performed by: PHYSICIAN ASSISTANT

## 2017-07-15 RX ORDER — SULFAMETHOXAZOLE AND TRIMETHOPRIM 800; 160 MG/1; MG/1
1 TABLET ORAL 2 TIMES DAILY
Qty: 20 TAB | Refills: 0 | Status: SHIPPED | OUTPATIENT
Start: 2017-07-15 | End: 2018-07-19

## 2017-07-15 ASSESSMENT — PAIN SCALES - GENERAL: PAINLEVEL: 1=MINIMAL PAIN

## 2017-07-15 NOTE — PROGRESS NOTES
Urgent Care Note UTI  This patients PCP is: Zeb Burdick M.D.    Reason for visit:  Polyuria, urgency of urination    HPI:  Shashank Christian is a 66 y.o. old patient who is seeing us today in the [facility] system.  This is a pleasant patient and I appreciate the opportunity to participate in the care of this patient.  This is a new problem today    1. Acute cystitis with hematuria  She has had Urgency and frequency and pain with urination for the last 3 days    2. Polyuria      3. Dysuria        ROS:   Constitutional: No fatigue,   HEENT: No Headache, No sore throat.  Cardiac: No chest pain,  Resp: No shortness of breath, No cough, No wheezing.  Gastro: No nausea or vomiting, No diarrhea.  : +polyuria, +hematuria, + pain with urination,    Lower extremities: No lower leg edema bilateral.   All other systems were reviewed and were negative.    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   • Vitamin D deficiency 07/21/2015   • Diabetes mellitus type II, controlled (CMS-HCC) 09/09/2014   • HTN (hypertension) 09/09/2014   • HLD (hyperlipidemia) 09/09/2014   • GERD (gastroesophageal reflux disease) 09/09/2014   • Chronic low back pain 09/09/2014   • Urinary incontinence with continuous leakage    • Vulvar cancer (CMS-HCC)        Past Surgical History:  Past Surgical History   Procedure Laterality Date   • Abdominal exploration     • Irrigation & debridement ortho Left 3/16/2017     Procedure: IRRIGATION & DEBRIDEMENT ORTHO-THIGH AND KNEE;  Surgeon: Rigo Irving M.D.;  Location: SURGERY  [facility];  Service:    • Hardware removal ortho Left 3/16/2017     Procedure: HARDWARE REMOVAL ORTHO-THIGH;  Surgeon: Rigo Irving M.D.;  Location: SURGERY [facility];  Service:    • Irrigation & debridement ortho Left 3/19/2017     Procedure: IRRIGATION & DEBRIDEMENT ORTHO KNEE;  Surgeon: Rigo Irving M.D.;  Location: SURGERY [facility];  Service:    • Knee revision total  3/19/2017     Procedure: KNEE REVISION TOTAL FOR POLYETHYLENE LINER EXCHANGE;  Surgeon: Rigo Irving M.D.;  Location: SURGERY [facility];  Service:        Allergies:  Review of patient's allergies indicates no known allergies.    Social History:  Social History     Social History   • Marital Status:      Spouse Name: N/A   • Number of Children: N/A   • Years of Education: N/A     Occupational History   • Not on file.     Social History Main Topics   • Smoking status: Never Smoker    • Smokeless tobacco: Never Used   • Alcohol Use: No   • Drug Use: No   • Sexual Activity: Not Currently     Other Topics Concern   • Not on file     Social History Narrative       Family History:  Family History   Problem Relation Age of Onset   • Cancer Mother    • Heart Disease Father        Medications:    Current outpatient prescriptions:   •  losartan (COZAAR) 50 MG Tab, TAKE 1 TABLET BY MOUTH EVERY DAY, Disp: 90 Tab, Rfl: 0  •  trazodone (DESYREL) 150 MG Tab, Take 1 Tab by mouth at bedtime as needed for Sleep., Disp: 90 Tab, Rfl: 3  •  oxycodone immediate-release (ROXICODONE) 5 MG Tab, Take 1 Tab by mouth every four hours as needed., Disp: 15 Tab, Rfl: 0  •  enoxaparin (LOVENOX) 40 MG/0.4ML Solution inj, Inject 40 mg as instructed every day., Disp: , Rfl:   •  ferrous gluconate (FERGON) 324 (38 FE) MG Tab, Take 1 Tab by mouth 2 times a day, with meals. (Patient not taking: Reported on 5/8/2017), Disp: 30 Tab, Rfl:   •  ondansetron (ZOFRAN ODT) 4 MG TABLET DISPERSIBLE, Take 1 Tab by mouth every four hours as needed for  Nausea/Vomiting (give PO if IV route is unavailable. May give per feeding tube.)., Disp: 10 Tab, Rfl: 0  •  potassium chloride ER (KLOR-CON) 10 MEQ tablet, Take 3 Tabs by mouth 2 Times a Day. (Patient not taking: Reported on 5/8/2017), Disp: 60 Tab, Rfl: 3  •  tramadol (ULTRAM) 50 MG Tab, Take 1 Tab by mouth every 6 hours as needed for Mild Pain or Moderate Pain., Disp: 30 Tab, Rfl: 0  •  oxycodone-acetaminophen (PERCOCET-10)  MG Tab, Take 1-2 Tabs by mouth every four hours as needed for Severe Pain., Disp: , Rfl:   •  omeprazole (PRILOSEC) 20 MG delayed-release capsule, Take 1 Cap by mouth every day., Disp: 90 Cap, Rfl: 3  •  oxybutynin (DITROPAN) 5 MG Tab, Take 2 Tabs by mouth every bedtime., Disp: 180 Tab, Rfl: 3  •  sertraline (ZOLOFT) 100 MG Tab, Take 1 Tab by mouth every day., Disp: 30 Tab, Rfl: 6      Physical Examination:   Vital signs: LMP 10/01/1990  General: No distress, cooperative, well dressed and well nourished.   Eyes: No scleral icterus or discharge, No hyposphagma  ENMT: Normal on external inspection of nose, lips, No nasal drainage   Resp: Normal effort, Bilateral clear to auscultation, No wheezing  CVS: Regular rate and rhythm  Neuro: Alert and oriented  Skin: No rash, No Ulcers  Psych: Normal mood and affect      Assessment and Plan:    1. Acute cystitis with hematuria  UTI Septra DS given today and UA was sent to culture    +protein, leukocytes on UA    2. Polyuria  Minor pain with urination    3. Dysuria  Urgency to go but not a lot comes out she states    During this patients visit with me today an antibiotic may have been given.  Complete the full course of the antibiotic as directed.  Bacterial infections will usually begin to get better about 24-36hours after you begin the antibiotic. Drink plenty of water to stay hydrated and to flush the urinary system.  Take over-the-counter Ibuprofen or Tylenol if needed for pain, discomfort or fever as directed by the product label.  Increase  your rest. Follow-up with your PCP in 3-7 days if you are not getting better.  Return to UC or the ER if symptoms become worse.  Patient understands these discharge instructions.      Buck Niño PA-C  07/15/2017    CC:   Zeb Burdick M.D.

## 2017-07-15 NOTE — MR AVS SNAPSHOT
"        Shashank Christian   7/15/2017 9:30 AM   Office Visit   MRN: [id number]    [facility] Phone:  [phone]    Description:  Female : 1950   Provider:  Buck Niño PA-C           Reason for Visit     UTI x 1 week - urgency/burning w/back painx3 days - took home uti test, positive      Allergies as of 7/15/2017     No Known Allergies      You were diagnosed with     Acute cystitis with hematuria   [174699]       Polyuria   [788.42.ICD-9-CM]       Dysuria   [788.1.ICD-9-CM]         Vital Signs     Blood Pressure Pulse Temperature Respirations Height Weight    120/62 mmHg 60 36.6 °C (97.9 °F) 16 1.626 m (5' 4.02\") 85.73 kg (189 lb)    Body Mass Index Oxygen Saturation Last Menstrual Period Smoking Status          32.43 kg/m2 94% 10/01/1990 Never Smoker         Basic Information     Date Of Birth Sex Race Ethnicity Preferred Language    1950 Female White Non- English      Your appointments     2017  2:45 PM   Established Patient with Zeb Burdick M.D.   [facility] (--)    [facility] [address] [address] [phone]           You will be receiving a confirmation call a few days before your appointment from our automated call confirmation system.              Problem List              ICD-10-CM Priority Class Noted - Resolved    Urinary incontinence with continuous leakage N39.45   Unknown - Present    Vulvar cancer (CMS-[facility]) C51.9   Unknown - Present    Diabetes mellitus type II, controlled (CMS-[facility]) E11.9   2014 - Present    HTN (hypertension) I10   2014 - Present    HLD (hyperlipidemia) E78.5   2014 - Present    GERD (gastroesophageal reflux disease) K21.9   2014 - Present    Chronic low back pain M54.5, G89.29   2014 - Present    Depression F32.9   2015 - Present    Insomnia G47.00   2015 - Present    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   2015 - Present    Vitamin " D deficiency E55.9   7/21/2015 - Present    Fluid collection at surgical site T88.8XXA High  3/16/2017 - Present    Fatty liver disease, nonalcoholic K76.0   3/16/2017 - Present    Dental caries K02.9   3/16/2017 - Present    Elevated LFTs R94.5   3/16/2017 - Present    Staphylococcus aureus bacteremia with sepsis (CMS-HCC) A41.01   3/17/2017 - Present    Staphylococcus aureus bacteremia R78.81   3/17/2017 - Present      Health Maintenance        Date Due Completion Dates    RETINAL SCREENING 10/3/1966 ---    URINE ACR / MICROALBUMIN 10/3/1966 ---    IMM DTaP/Tdap/Td Vaccine (1 - Tdap) 10/3/1967 ---    IMM ZOSTER VACCINE 10/3/2008 ---    IMM PNEUMOCOCCAL 65+ (ADULT) LOW/MEDIUM RISK SERIES (1 of 2 - PCV13) 10/3/2013 ---    MAMMOGRAM 8/9/2015 8/9/2014 (Prv Comp), 5/18/2006, 8/10/2004    Override on 8/9/2014: Previously completed    A1C SCREENING 1/16/2016 7/16/2015    FASTING LIPID PROFILE 7/16/2016 7/16/2015    IMM INFLUENZA (1) 9/1/2017 ---    COLONOSCOPY 9/9/2017 9/9/2007 (Prv Comp)    Override on 9/9/2007: Previously completed    SERUM CREATININE 3/27/2018 3/27/2017, 3/26/2017, 3/23/2017, 3/22/2017, 3/21/2017, 3/20/2017, 3/18/2017, 3/17/2017, 3/15/2017, 1/17/2017, 7/16/2015, 10/10/2007, 10/4/2007, 10/2/2007, 9/29/2007, 9/26/2007, 9/12/2007, 12/29/2005, 12/22/2005, 8/31/2005, 8/12/2005    DIABETES MONOFILAMENT / LE EXAM 5/8/2018 5/8/2017 (Done), 9/9/2014 (N/S)    Override on 5/8/2017: Done    Override on 9/9/2014: (N/S)    BONE DENSITY 8/9/2019 8/9/2014 (Prv Comp)    Override on 8/9/2014: Previously completed    PAP SMEAR 6/27/2020 6/27/2017, 10/18/2016, 6/9/2014 (Prv Comp)    Override on 6/9/2014: Previously completed (baldwin)            Current Immunizations     No immunizations on file.      Below and/or attached are the medications your provider expects you to take. Review all of your home medications and newly ordered medications with your provider and/or pharmacist. Follow medication instructions as directed by  your provider and/or pharmacist. Please keep your medication list with you and share with your provider. Update the information when medications are discontinued, doses are changed, or new medications (including over-the-counter products) are added; and carry medication information at all times in the event of emergency situations     Allergies:  No Known Allergies          Medications  Valid as of: July 15, 2017 - 10:05 AM    Generic Name Brand Name Tablet Size Instructions for use    Enoxaparin Sodium (Solution) LOVENOX 40 MG/0.4ML Inject 40 mg as instructed every day.        Ferrous Gluconate (Tab) FERGON 324 (38 FE) MG Take 1 Tab by mouth 2 times a day, with meals.        Losartan Potassium (Tab) COZAAR 50 MG TAKE 1 TABLET BY MOUTH EVERY DAY        Omeprazole (CAPSULE DELAYED RELEASE) PRILOSEC 20 MG Take 1 Cap by mouth every day.        Ondansetron (TABLET DISPERSIBLE) ZOFRAN ODT 4 MG Take 1 Tab by mouth every four hours as needed for Nausea/Vomiting (give PO if IV route is unavailable. May give per feeding tube.).        Oxybutynin Chloride (Tab) DITROPAN 5 MG Take 2 Tabs by mouth every bedtime.        OxyCODONE HCl (Tab) ROXICODONE 5 MG Take 1 Tab by mouth every four hours as needed.        Oxycodone-Acetaminophen (Tab) PERCOCET-10  MG Take 1-2 Tabs by mouth every four hours as needed for Severe Pain.        Potassium Chloride (Tab CR) KLOR-CON 10 MEQ Take 3 Tabs by mouth 2 Times a Day.        Sertraline HCl (Tab) ZOLOFT 100 MG Take 1 Tab by mouth every day.        Sulfamethoxazole-Trimethoprim (Tab) BACTRIM -160 MG Take 1 Tab by mouth 2 times a day.        TraMADol HCl (Tab) ULTRAM 50 MG Take 1 Tab by mouth every 6 hours as needed for Mild Pain or Moderate Pain.        TraZODone HCl (Tab) DESYREL 150 MG Take 1 Tab by mouth at bedtime as needed for Sleep.        .                 Medicines prescribed today were sent to:     [id number]  [place], [place] - [address] AT [facility] [address]    [address] [facility] [address] [place] [address]    Phone: [phone] Fax: [phone]    Open 24 Hours?: No    [id number] - [place], [place] - [address] AT [facility] [place] [address] [facility] [place] [address]    Phone: [phone] Fax: [phone]    Open 24 Hours?: No      Medication refill instructions:       If your prescription bottle indicates you have medication refills left, it is not necessary to call your provider’s office. Please contact your pharmacy and they will refill your medication.    If your prescription bottle indicates you do not have any refills left, you may request refills at any time through one of the following ways: The online Switchfly system (except Urgent Care), by calling your provider’s office, or by asking your pharmacy to contact your provider’s office with a refill request. Medication refills are processed only during regular business hours and may not be available until the next business day. Your provider may request additional information or to have a follow-up visit with you prior to refilling your medication.   *Please Note: Medication refills are assigned a new Rx number when refilled electronically. Your pharmacy may indicate that no refills were authorized even though a new prescription for the same medication is available at the pharmacy. Please request the medicine by name with the pharmacy before contacting your provider for a refill.        Other Notes About Your Plan     Last UDS: 9/9/2014  Controlled Substance Agreement Signed: 9/9/2014           Switchfly Access Code: [email or web address]  Expires: 7/30/2017  4:09 AM    Switchfly  A secure, online tool to manage your health information     [employer]’s Switchfly® is a secure, online tool that connects you to your personalized health information from the privacy of your home -- day or night - making it very easy for you to manage your healthcare. Once the  activation process is completed, you can even access your medical information using the "[employer]" hortensia, which is available for free in the Apple Hortensia store or Google Play store.     "[employer]" provides the following levels of access (as shown below):   My Chart Features   [facility] Doctor [facility]  Specialists [facility]  Urgent  Care Non-[facility]  Primary Care  Doctor   Email your healthcare team securely and privately 24/7 X X X    Manage appointments: schedule your next appointment; view details of past/upcoming appointments X      Request prescription refills. X      View recent personal medical records, including lab and immunizations X X X X   View health record, including health history, allergies, medications X X X X   Read reports about your outpatient visits, procedures, consult and ER notes X X X X   See your discharge summary, which is a recap of your hospital and/or ER visit that includes your diagnosis, lab results, and care plan. X X       How to register for "[employer]":  1. Go to  [email or web address].  2. Click on the Sign Up Now box, which takes you to the New Member Sign Up page. You will need to provide the following information:  a. Enter your "[employer]" Access Code exactly as it appears at the top of this page. (You will not need to use this code after you’ve completed the sign-up process. If you do not sign up before the expiration date, you must request a new code.)   b. Enter your date of birth.   c. Enter your home email address.   d. Click Submit, and follow the next screen’s instructions.  3. Create a "[employer]" ID. This will be your "[employer]" login ID and cannot be changed, so think of one that is secure and easy to remember.  4. Create a "[employer]" password. You can change your password at any time.  5. Enter your Password Reset Question and Answer. This can be used at a later time if you forget your password.   6. Enter your e-mail address. This allows you to receive e-mail notifications when new  information is available in [employer].  7. Click Sign Up. You can now view your health information.    For assistance activating your [employer] account, call [phone]

## 2017-07-17 ENCOUNTER — TELEPHONE (OUTPATIENT)
Dept: ENDOCRINOLOGY | Facility: MEDICAL CENTER | Age: 67
End: 2017-07-17

## 2017-07-17 LAB
BACTERIA UR CULT: NORMAL
SIGNIFICANT IND 70042: NORMAL
SOURCE SOURCE: NORMAL

## 2017-07-17 NOTE — TELEPHONE ENCOUNTER
----- Message from Buck Niño PA-C sent at 7/17/2017  7:17 AM PDT -----  Please let know normal results of recent labs

## 2017-07-24 ENCOUNTER — OFFICE VISIT (OUTPATIENT)
Dept: MEDICAL GROUP | Facility: CLINIC | Age: 67
End: 2017-07-24
Payer: MEDICARE

## 2017-07-24 VITALS
TEMPERATURE: 99.2 F | HEIGHT: 64 IN | RESPIRATION RATE: 14 BRPM | HEART RATE: 84 BPM | BODY MASS INDEX: 31.92 KG/M2 | WEIGHT: 187 LBS | DIASTOLIC BLOOD PRESSURE: 94 MMHG | SYSTOLIC BLOOD PRESSURE: 162 MMHG | OXYGEN SATURATION: 93 %

## 2017-07-24 DIAGNOSIS — N30.01 ACUTE CYSTITIS WITH HEMATURIA: ICD-10-CM

## 2017-07-24 DIAGNOSIS — F32.A DEPRESSION, UNSPECIFIED DEPRESSION TYPE: ICD-10-CM

## 2017-07-24 DIAGNOSIS — Z86.39 HISTORY OF DIABETES MELLITUS: ICD-10-CM

## 2017-07-24 DIAGNOSIS — Z96.652 S/P REVISION OF TOTAL KNEE, LEFT: ICD-10-CM

## 2017-07-24 DIAGNOSIS — R30.0 DYSURIA: ICD-10-CM

## 2017-07-24 PROBLEM — R79.89 ELEVATED LFTS: Status: RESOLVED | Noted: 2017-03-16 | Resolved: 2017-07-24

## 2017-07-24 PROBLEM — K76.0 FATTY LIVER DISEASE, NONALCOHOLIC: Status: RESOLVED | Noted: 2017-03-16 | Resolved: 2017-07-24

## 2017-07-24 PROBLEM — A41.01 STAPHYLOCOCCUS AUREUS BACTEREMIA WITH SEPSIS (HCC): Status: RESOLVED | Noted: 2017-03-17 | Resolved: 2017-07-24

## 2017-07-24 PROBLEM — K02.9 DENTAL CARIES: Status: RESOLVED | Noted: 2017-03-16 | Resolved: 2017-07-24

## 2017-07-24 PROBLEM — R78.81 STAPHYLOCOCCUS AUREUS BACTEREMIA: Status: RESOLVED | Noted: 2017-03-17 | Resolved: 2017-07-24

## 2017-07-24 PROBLEM — T88.8XXA FLUID COLLECTION AT SURGICAL SITE: Status: RESOLVED | Noted: 2017-03-16 | Resolved: 2017-07-24

## 2017-07-24 PROBLEM — B95.61 STAPHYLOCOCCUS AUREUS BACTEREMIA: Status: RESOLVED | Noted: 2017-03-17 | Resolved: 2017-07-24

## 2017-07-24 LAB
APPEARANCE UR: NORMAL
BILIRUB UR STRIP-MCNC: NORMAL MG/DL
COLOR UR AUTO: NORMAL
GLUCOSE UR STRIP.AUTO-MCNC: NORMAL MG/DL
KETONES UR STRIP.AUTO-MCNC: NORMAL MG/DL
LEUKOCYTE ESTERASE UR QL STRIP.AUTO: NORMAL
NITRITE UR QL STRIP.AUTO: NORMAL
PH UR STRIP.AUTO: 5 [PH] (ref 5–8)
PROT UR QL STRIP: 30 MG/DL
RBC UR QL AUTO: NORMAL
SP GR UR STRIP.AUTO: 1.02
UROBILINOGEN UR STRIP-MCNC: NORMAL MG/DL

## 2017-07-24 PROCEDURE — 99214 OFFICE O/P EST MOD 30 MIN: CPT | Performed by: FAMILY MEDICINE

## 2017-07-24 PROCEDURE — 81002 URINALYSIS NONAUTO W/O SCOPE: CPT | Performed by: FAMILY MEDICINE

## 2017-07-24 RX ORDER — SERTRALINE HYDROCHLORIDE 100 MG/1
100 TABLET, FILM COATED ORAL DAILY
Qty: 90 TAB | Refills: 6 | Status: SHIPPED | OUTPATIENT
Start: 2017-07-24 | End: 2018-07-19 | Stop reason: SDUPTHER

## 2017-07-24 RX ORDER — AMOXICILLIN 500 MG/1
500 CAPSULE ORAL 3 TIMES DAILY
Qty: 30 CAP | Refills: 0 | Status: SHIPPED | OUTPATIENT
Start: 2017-07-24 | End: 2018-07-19

## 2017-07-24 ASSESSMENT — PAIN SCALES - GENERAL: PAINLEVEL: 3=SLIGHT PAIN

## 2017-07-24 ASSESSMENT — ENCOUNTER SYMPTOMS
NECK PAIN: 0
CONSTITUTIONAL NEGATIVE: 1
FEVER: 0
DIZZINESS: 0
CARDIOVASCULAR NEGATIVE: 1
CONSTIPATION: 0
GASTROINTESTINAL NEGATIVE: 1
HEADACHES: 0
NEUROLOGICAL NEGATIVE: 1
HEMOPTYSIS: 0
CHILLS: 0
PALPITATIONS: 0
MYALGIAS: 0
COUGH: 0
PSYCHIATRIC NEGATIVE: 1
RESPIRATORY NEGATIVE: 1
EYES NEGATIVE: 1

## 2017-07-24 NOTE — MR AVS SNAPSHOT
"Shashank Christian   2017 2:45 PM   Office Visit   MRN: [id number]    [facility] Phone[facility]  [phone]    Description:  Female : 1950   Provider:  Zeb Burdick M.D.           Reason for Visit     Medication Refill     Extremity Weakness can hardly walk / lethargic      Allergies as of 2017     No Known Allergies      You were diagnosed with     Dysuria   [788.1.ICD-9-CM]       Acute cystitis with hematuria   [023969]       S/P revision of total knee, left   [418033]       History of diabetes mellitus   [850872]       Depression, unspecified depression type   [6935437]         Vital Signs     Blood Pressure Pulse Temperature Respirations Height Weight    162/94 mmHg 84 37.3 °C (99.2 °F) 14 1.626 m (5' 4\") 84.823 kg (187 lb)    Body Mass Index Oxygen Saturation Last Menstrual Period Smoking Status          32.08 kg/m2 93% 10/01/1990 Never Smoker         Basic Information     Date Of Birth Sex Race Ethnicity Preferred Language    1950 Female White Non- English      Problem List              ICD-10-CM Priority Class Noted - Resolved    Vulvar cancer (CMS-HCC) C51.9   Unknown - Present    HTN (hypertension) I10   2014 - Present    HLD (hyperlipidemia) E78.5   2014 - Present    Chronic low back pain M54.5, G89.29   2014 - Present    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   2015 - Present      Health Maintenance        Date Due Completion Dates    RETINAL SCREENING 10/3/1966 ---    URINE ACR / MICROALBUMIN 10/3/1966 ---    IMM DTaP/Tdap/Td Vaccine (1 - Tdap) 10/3/1967 ---    IMM ZOSTER VACCINE 10/3/2008 ---    IMM PNEUMOCOCCAL 65+ (ADULT) LOW/MEDIUM RISK SERIES (1 of 2 - PCV13) 10/3/2013 ---    MAMMOGRAM 2015 (Prv Comp), 2006, 8/10/2004    Override on 2014: Previously completed    A1C SCREENING 2016    FASTING LIPID PROFILE 2016 2015    IMM INFLUENZA (1) 2017 ---    COLONOSCOPY 2017 " 9/9/2007 (Prv Comp)    Override on 9/9/2007: Previously completed    SERUM CREATININE 3/27/2018 3/27/2017, 3/26/2017, 3/23/2017, 3/22/2017, 3/21/2017, 3/20/2017, 3/18/2017, 3/17/2017, 3/15/2017, 1/17/2017, 7/16/2015, 10/10/2007, 10/4/2007, 10/2/2007, 9/29/2007, 9/26/2007, 9/12/2007, 12/29/2005, 12/22/2005, 8/31/2005, 8/12/2005    DIABETES MONOFILAMENT / LE EXAM 5/8/2018 5/8/2017 (Done), 9/9/2014 (N/S)    Override on 5/8/2017: Done    Override on 9/9/2014: (N/S)    BONE DENSITY 8/9/2019 8/9/2014 (Prv Comp)    Override on 8/9/2014: Previously completed    PAP SMEAR 6/27/2020 6/27/2017, 10/18/2016, 6/9/2014 (Prv Comp)    Override on 6/9/2014: Previously completed (baldwin)            Results     POCT Urinalysis      Component Value Standard Range & Units    POC Color gold Negative    POC Appearance semi turbid Negative    POC Leukocyte Esterase moderate Negative    POC Nitrites neg Negative    POC Urobiligen neg Negative (0.2) mg/dL    POC Protein 30 Negative mg/dL    POC Urine PH 5 5.0 - 8.0    POC Blood small Negative    POC Specific Gravity 1.025 <1.005 - >1.030    POC Ketones neg Negative mg/dL    POC Biliruben neg Negative mg/dL    POC Glucose neg Negative mg/dL                        Current Immunizations     No immunizations on file.      Below and/or attached are the medications your provider expects you to take. Review all of your home medications and newly ordered medications with your provider and/or pharmacist. Follow medication instructions as directed by your provider and/or pharmacist. Please keep your medication list with you and share with your provider. Update the information when medications are discontinued, doses are changed, or new medications (including over-the-counter products) are added; and carry medication information at all times in the event of emergency situations     Allergies:  No Known Allergies          Medications  Valid as of: July 24, 2017 -  4:01 PM    Generic Name Brand Name Tablet  Size Instructions for use    Amoxicillin (Cap) AMOXIL 500 MG Take 1 Cap by mouth 3 times a day.        Losartan Potassium (Tab) COZAAR 50 MG TAKE 1 TABLET BY MOUTH EVERY DAY        Omeprazole (CAPSULE DELAYED RELEASE) PRILOSEC 20 MG Take 1 Cap by mouth every day.        Oxycodone-Acetaminophen (Tab) PERCOCET-10  MG Take 1-2 Tabs by mouth every four hours as needed for Severe Pain.        Sertraline HCl (Tab) ZOLOFT 100 MG Take 1 Tab by mouth every day.        Sulfamethoxazole-Trimethoprim (Tab) BACTRIM -160 MG Take 1 Tab by mouth 2 times a day.        TraZODone HCl (Tab) DESYREL 150 MG Take 1 Tab by mouth at bedtime as needed for Sleep.        .                 Medicines prescribed today were sent to:     [id number] - [place], [place] - [address] [facility] [address] [facility] [address]    [address] [facility] [address] [facility] [place] [address]    Phone: [phone] Fax: [phone]    Open 24 Hours?: No    [facility] - [facility] [address] [facility] AT [place] [address] [address] [facility] [place] [address]    Phone: [phone] Fax: [phone]    Open 24 Hours?: No      Medication refill instructions:       If your prescription bottle indicates you have medication refills left, it is not necessary to call your provider’s office. Please contact your pharmacy and they will refill your medication.    If your prescription bottle indicates you do not have any refills left, you may request refills at any time through one of the following ways: The online [employer] system (except Urgent Care), by calling your provider’s office, or by asking your pharmacy to contact your provider’s office with a refill request. Medication refills are processed only during regular business hours and may not be available until the next business day. Your provider may request additional information or to have a follow-up visit with you prior to refilling your medication.   *Please Note:  Medication refills are assigned a new Rx number when refilled electronically. Your pharmacy may indicate that no refills were authorized even though a new prescription for the same medication is available at the pharmacy. Please request the medicine by name with the pharmacy before contacting your provider for a refill.        Your To Do List     Future Labs/Procedures Complete By Expires    CBC WITHOUT DIFFERENTIAL  As directed 1/24/2018    COMP METABOLIC PANEL  As directed 7/24/2018    FREE THYROXINE  As directed 7/24/2018    HEMOGLOBIN A1C  As directed 7/24/2018    TRIIDOTHYRONINE  As directed 7/24/2018    TSH  As directed 7/24/2018    VITAMIN D,25 HYDROXY  As directed 7/24/2018      Other Notes About Your Plan     Last UDS: 9/9/2014  Controlled Substance Agreement Signed: 9/9/2014           [employer] Access Code: [email or web address]  Expires: 7/30/2017  4:09 AM    Central Logic  A secure, online tool to manage your health information     [employer]’s Central Logic® is a secure, online tool that connects you to your personalized health information from the privacy of your home -- day or night - making it very easy for you to manage your healthcare. Once the activation process is completed, you can even access your medical information using the [employer] hortensia, which is available for free in the Apple Hortensia store or Google Play store.     [employer] provides the following levels of access (as shown below):   My Chart Features   [facility] Primary [facility] Doctor [facility]  Specialists [facility]  Urgent  Care Non-[facility]  Primary Care  Doctor   Email your healthcare team securely and privately 24/7 X X X    Manage appointments: schedule your next appointment; view details of past/upcoming appointments X      Request prescription refills. X      View recent personal medical records, including lab and immunizations X X X X   View health record, including health history, allergies, medications X X X X   Read reports about your outpatient visits,  procedures, consult and ER notes X X X X   See your discharge summary, which is a recap of your hospital and/or ER visit that includes your diagnosis, lab results, and care plan. X X       How to register for JustFamily:  1. Go to  [email or web address].  2. Click on the Sign Up Now box, which takes you to the New Member Sign Up page. You will need to provide the following information:  a. Enter your [employer] Access Code exactly as it appears at the top of this page. (You will not need to use this code after you’ve completed the sign-up process. If you do not sign up before the expiration date, you must request a new code.)   b. Enter your date of birth.   c. Enter your home email address.   d. Click Submit, and follow the next screen’s instructions.  3. Create a Kidost ID. This will be your [employer] login ID and cannot be changed, so think of one that is secure and easy to remember.  4. Create a [employer] password. You can change your password at any time.  5. Enter your Password Reset Question and Answer. This can be used at a later time if you forget your password.   6. Enter your e-mail address. This allows you to receive e-mail notifications when new information is available in [employer]Family.  7. Click Sign Up. You can now view your health information.    For assistance activating your JustFamily account, call [phone]

## 2017-07-24 NOTE — PROGRESS NOTES
Subjective:      Shashank Christian is a 66 y.o. female who presents with Medication Refill and Extremity Weakness            HPI Comments: 1. Dysuria    - POCT Urinalysis  - amoxicillin (AMOXIL) 500 MG Cap; Take 1 Cap by mouth 3 times a day.  Dispense: 30 Cap; Refill: 0  - COMP METABOLIC PANEL; Future  - FREE THYROXINE; Future  - HEMOGLOBIN A1C; Future  - TRIIDOTHYRONINE; Future  - TSH; Future  - VITAMIN D,25 HYDROXY; Future  - CBC WITHOUT DIFFERENTIAL; Future    2. Acute cystitis with hematuria  Had a recent uti but not resolved with bactrim, will switch abx and repeat u/a when done  - POCT Urinalysis  - amoxicillin (AMOXIL) 500 MG Cap; Take 1 Cap by mouth 3 times a day.  Dispense: 30 Cap; Refill: 0  - COMP METABOLIC PANEL; Future  - FREE THYROXINE; Future  - HEMOGLOBIN A1C; Future  - TRIIDOTHYRONINE; Future  - TSH; Future  - VITAMIN D,25 HYDROXY; Future  - CBC WITHOUT DIFFERENTIAL; Future    3. S/P revision of total knee, left  Has appt tor f/u with ortho for infected left knee replacement, no fevers or chills now  - POCT Urinalysis  - amoxicillin (AMOXIL) 500 MG Cap; Take 1 Cap by mouth 3 times a day.  Dispense: 30 Cap; Refill: 0  - COMP METABOLIC PANEL; Future  - FREE THYROXINE; Future  - HEMOGLOBIN A1C; Future  - TRIIDOTHYRONINE; Future  - TSH; Future  - VITAMIN D,25 HYDROXY; Future  - CBC WITHOUT DIFFERENTIAL; Future    4. History of diabetes mellitus    - POCT Urinalysis  - amoxicillin (AMOXIL) 500 MG Cap; Take 1 Cap by mouth 3 times a day.  Dispense: 30 Cap; Refill: 0  - COMP METABOLIC PANEL; Future  - FREE THYROXINE; Future  - HEMOGLOBIN A1C; Future  - TRIIDOTHYRONINE; Future  - TSH; Future  - VITAMIN D,25 HYDROXY; Future  - CBC WITHOUT DIFFERENTIAL; Future    Past Medical History:    Hypertension                                                  Diabetes                                                      GERD (gastroesophageal reflux disease)                        Urinary incontinence with continuous  leakage                  Vulvar cancer (CMS-HCC)                                         Comment:had chemo and radiation and then surgery for                recurrence, Dr. SOMMER    Depression                                      7/21/2015     Insomnia                                        7/21/2015     Vitamin D deficiency                            7/21/2015   Past Surgical History:    ABDOMINAL EXPLORATION                                          IRRIGATION & DEBRIDEMENT ORTHO                  Left 3/16/2017       Comment:Procedure: IRRIGATION & DEBRIDEMENT ORTHO-THIGH               AND KNEE;  Surgeon: Rigo Irving M.D.;                 Location: SURGERY [facility];  Service:     HARDWARE REMOVAL ORTHO                          Left 3/16/2017       Comment:Procedure: HARDWARE REMOVAL ORTHO-THIGH;                 Surgeon: Rigo Irving M.D.;  Location:                SURGERY [facility];  Service:     IRRIGATION & DEBRIDEMENT ORTHO                  Left 3/19/2017       Comment:Procedure: IRRIGATION & DEBRIDEMENT ORTHO KNEE;               Surgeon: Rigo Irving M.D.;  Location:                SURGERY [facility];  Service:     KNEE REVISION TOTAL                              3/19/2017       Comment:Procedure: KNEE REVISION TOTAL FOR POLYETHYLENE               LINER EXCHANGE;  Surgeon: Rigo Irving M.D.;  Location: SURGERY [facility];                 Service:     Smoking Status: Never Smoker                      Smokeless Status: Never Used                        Alcohol Use: No              Review of patient's family history indicates:    Cancer                         Mother                    Heart Disease                  Father                      Current outpatient prescriptions: •  amoxicillin (AMOXIL) 500 MG Cap, Take 1 Cap by mouth 3 times a day., Disp: 30 Cap, Rfl: 0•  sulfamethoxazole-trimethoprim (BACTRIM DS) 800-160 MG tablet, Take 1 Tab by mouth 2 times  a day., Disp: 20 Tab, Rfl: 0•  losartan (COZAAR) 50 MG Tab, TAKE 1 TABLET BY MOUTH EVERY DAY, Disp: 90 Tab, Rfl: 0•  trazodone (DESYREL) 150 MG Tab, Take 1 Tab by mouth at bedtime as needed for Sleep., Disp: 90 Tab, Rfl: 3•  tramadol (ULTRAM) 50 MG Tab, Take 1 Tab by mouth every 6 hours as needed for Mild Pain or Moderate Pain., Disp: 30 Tab, Rfl: 0•  oxycodone-acetaminophen (PERCOCET-10)  MG Tab, Take 1-2 Tabs by mouth every four hours as needed for Severe Pain., Disp: , Rfl: •  omeprazole (PRILOSEC) 20 MG delayed-release capsule, Take 1 Cap by mouth every day., Disp: 90 Cap, Rfl: 3•  oxybutynin (DITROPAN) 5 MG Tab, Take 2 Tabs by mouth every bedtime., Disp: 180 Tab, Rfl: 3•  sertraline (ZOLOFT) 100 MG Tab, Take 1 Tab by mouth every day., Disp: 30 Tab, Rfl: 6•  oxycodone immediate-release (ROXICODONE) 5 MG Tab, Take 1 Tab by mouth every four hours as needed., Disp: 15 Tab, Rfl: 0•  enoxaparin (LOVENOX) 40 MG/0.4ML Solution inj, Inject 40 mg as instructed every day., Disp: , Rfl: •  ferrous gluconate (FERGON) 324 (38 FE) MG Tab, Take 1 Tab by mouth 2 times a day, with meals. (Patient not taking: Reported on 5/8/2017), Disp: 30 Tab, Rfl: •  ondansetron (ZOFRAN ODT) 4 MG TABLET DISPERSIBLE, Take 1 Tab by mouth every four hours as needed for Nausea/Vomiting (give PO if IV route is unavailable. May give per feeding tube.)., Disp: 10 Tab, Rfl: 0•  potassium chloride ER (KLOR-CON) 10 MEQ tablet, Take 3 Tabs by mouth 2 Times a Day. (Patient not taking: Reported on 5/8/2017), Disp: 60 Tab, Rfl: 3        Extremity Weakness  Pertinent negatives include no chest pain, dizziness, fever, headaches, neck pain or palpitations.       Review of Systems   Constitutional: Negative.  Negative for fever and chills.        Past Medical History:    Hypertension                                                  Diabetes                                                      GERD (gastroesophageal reflux disease)                         Urinary incontinence with continuous leakage                  Vulvar cancer (CMS-HCC)                                         Comment:had chemo and radiation and then surgery for                recurrence, Dr. SOMMER    Depression                                      7/21/2015     Insomnia                                        7/21/2015     Vitamin D deficiency                            7/21/2015   Past Surgical History:    ABDOMINAL EXPLORATION                                          IRRIGATION & DEBRIDEMENT ORTHO                  Left 3/16/2017       Comment:Procedure: IRRIGATION & DEBRIDEMENT ORTHO-THIGH               AND KNEE;  Surgeon: Rigo Irving M.D.;                 Location: SURGERY [facility];  Service:     HARDWARE REMOVAL ORTHO                          Left 3/16/2017       Comment:Procedure: HARDWARE REMOVAL ORTHO-THIGH;                 Surgeon: Rigo Irving M.D.;  Location:                SURGERY [facility];  Service:     IRRIGATION & DEBRIDEMENT ORTHO                  Left 3/19/2017       Comment:Procedure: IRRIGATION & DEBRIDEMENT ORTHO KNEE;               Surgeon: Rigo Irving M.D.;  Location:                SURGERY [facility];  Service:     KNEE REVISION TOTAL                              3/19/2017       Comment:Procedure: KNEE REVISION TOTAL FOR POLYETHYLENE               LINER EXCHANGE;  Surgeon: Rigo Irving M.D.;  Location: SURGERY [facility];                 Service:     Smoking Status: Never Smoker                      Smokeless Status: Never Used                        Alcohol Use: No              Review of patient's family history indicates:    Cancer                         Mother                    Heart Disease                  Father                     HENT: Negative.    Eyes: Negative.    Respiratory: Negative.  Negative for cough and hemoptysis.    Cardiovascular: Negative.  Negative for chest pain and palpitations.  "  Gastrointestinal: Negative.  Negative for constipation.   Genitourinary: Negative.  Negative for dysuria and urgency.   Musculoskeletal: Positive for joint pain and extremity weakness. Negative for myalgias and neck pain.   Skin: Negative.  Negative for rash.   Neurological: Negative.  Negative for dizziness and headaches.   Endo/Heme/Allergies: Negative.    Psychiatric/Behavioral: Negative.  Negative for suicidal ideas.          Objective:     /94 mmHg  Pulse 84  Temp(Src) 37.3 °C (99.2 °F)  Resp 14  Ht 1.626 m (5' 4\")  Wt 84.823 kg (187 lb)  BMI 32.08 kg/m2  SpO2 93%  LMP 10/01/1990     Physical Exam   Constitutional: She is oriented to person, place, and time. No distress.   HENT:   Head: Normocephalic and atraumatic.   Right Ear: External ear normal.   Left Ear: External ear normal.   Nose: Nose normal.   Mouth/Throat: Oropharynx is clear and moist. No oropharyngeal exudate.   Eyes: Pupils are equal, round, and reactive to light. Right eye exhibits no discharge. Left eye exhibits no discharge. No scleral icterus.   Neck: Normal range of motion. Neck supple. No JVD present. No tracheal deviation present. No thyromegaly present.   Cardiovascular: Normal rate, regular rhythm, normal heart sounds and intact distal pulses.  Exam reveals no gallop and no friction rub.    No murmur heard.  Pulmonary/Chest: Effort normal and breath sounds normal. No stridor. No respiratory distress. She has no wheezes. She has no rales. She exhibits no tenderness.   Abdominal: Soft. She exhibits no distension. There is no tenderness.   Musculoskeletal:   Clean scars on left knee with no erythema or fluctuance   Lymphadenopathy:     She has no cervical adenopathy.   Neurological: She is alert and oriented to person, place, and time.   Skin: Skin is warm and dry. She is not diaphoretic.   Psychiatric: Judgment normal.   Nursing note and vitals reviewed.              Assessment/Plan:     1. Dysuria    - POCT Urinalysis  - " amoxicillin (AMOXIL) 500 MG Cap; Take 1 Cap by mouth 3 times a day.  Dispense: 30 Cap; Refill: 0  - COMP METABOLIC PANEL; Future  - FREE THYROXINE; Future  - HEMOGLOBIN A1C; Future  - TRIIDOTHYRONINE; Future  - TSH; Future  - VITAMIN D,25 HYDROXY; Future  - CBC WITHOUT DIFFERENTIAL; Future    2. Acute cystitis with hematuria    - POCT Urinalysis  - amoxicillin (AMOXIL) 500 MG Cap; Take 1 Cap by mouth 3 times a day.  Dispense: 30 Cap; Refill: 0  - COMP METABOLIC PANEL; Future  - FREE THYROXINE; Future  - HEMOGLOBIN A1C; Future  - TRIIDOTHYRONINE; Future  - TSH; Future  - VITAMIN D,25 HYDROXY; Future  - CBC WITHOUT DIFFERENTIAL; Future    3. S/P revision of total knee, left    - POCT Urinalysis  - amoxicillin (AMOXIL) 500 MG Cap; Take 1 Cap by mouth 3 times a day.  Dispense: 30 Cap; Refill: 0  - COMP METABOLIC PANEL; Future  - FREE THYROXINE; Future  - HEMOGLOBIN A1C; Future  - TRIIDOTHYRONINE; Future  - TSH; Future  - VITAMIN D,25 HYDROXY; Future  - CBC WITHOUT DIFFERENTIAL; Future    4. History of diabetes mellitus    - POCT Urinalysis  - amoxicillin (AMOXIL) 500 MG Cap; Take 1 Cap by mouth 3 times a day.  Dispense: 30 Cap; Refill: 0  - COMP METABOLIC PANEL; Future  - FREE THYROXINE; Future  - HEMOGLOBIN A1C; Future  - TRIIDOTHYRONINE; Future  - TSH; Future  - VITAMIN D,25 HYDROXY; Future  - CBC WITHOUT DIFFERENTIAL; Future

## 2017-08-30 ENCOUNTER — HOSPITAL ENCOUNTER (OUTPATIENT)
Facility: MEDICAL CENTER | Age: 67
End: 2017-08-30
Attending: ORTHOPAEDIC SURGERY
Payer: MEDICARE

## 2017-08-30 ENCOUNTER — HOSPITAL ENCOUNTER (OUTPATIENT)
Dept: RADIOLOGY | Facility: MEDICAL CENTER | Age: 67
End: 2017-08-30
Attending: ORTHOPAEDIC SURGERY
Payer: MEDICARE

## 2017-08-30 DIAGNOSIS — M79.652 LEFT THIGH PAIN: ICD-10-CM

## 2017-08-30 LAB
APPEARANCE FLD: NORMAL
BODY FLD TYPE: NORMAL
COLOR FLD: NORMAL
CSF COMMENTS 1658: NORMAL
LYMPHOCYTES NFR FLD: 11 %
MESOTHL CELL NFR FLD: 5 %
NEUTROPHILS NFR FLD: 83 %
RBC # FLD: NORMAL CELLS/UL
WBC # FLD: 2340 CELLS/UL
WBC OTHER NFR FLD: 1 %

## 2017-08-30 PROCEDURE — 80500 HCHG CLINICAL PATH CONSULT-LIMITED: CPT

## 2017-08-30 PROCEDURE — 700101 HCHG RX REV CODE 250

## 2017-08-30 PROCEDURE — 87205 SMEAR GRAM STAIN: CPT

## 2017-08-30 PROCEDURE — 20610 DRAIN/INJ JOINT/BURSA W/O US: CPT | Mod: LT

## 2017-08-30 PROCEDURE — 89051 BODY FLUID CELL COUNT: CPT

## 2017-08-30 PROCEDURE — 87070 CULTURE OTHR SPECIMN AEROBIC: CPT

## 2017-08-30 RX ORDER — LIDOCAINE HYDROCHLORIDE 10 MG/ML
INJECTION, SOLUTION INFILTRATION; PERINEURAL
Status: DISPENSED
Start: 2017-08-30 | End: 2017-08-31

## 2017-08-30 NOTE — OR SURGEON
Immediate Post- Operative Note    PostOp Diagnosis: Lt ZACKARY    Procedure(s): Lt hip aspiration    Estimated Blood Loss: Less than 1 ml    Complications: None    Specimen: 1/2 mL serosang fluid to the lab    8/30/2017     4:07 PM     Rigo Roman

## 2017-08-31 LAB
PATH REV: NORMAL
PATH REV: NORMAL

## 2017-08-31 PROCEDURE — 80500 HCHG CLINICAL PATH CONSULT-LIMITED: CPT

## 2017-09-02 LAB
BACTERIA FLD AEROBE CULT: NORMAL
BACTERIA FLD AEROBE CULT: NORMAL
GRAM STN SPEC: NORMAL
SIGNIFICANT IND 70042: NORMAL
SITE SITE: NORMAL
SOURCE SOURCE: NORMAL

## 2017-11-15 DIAGNOSIS — N39.45 URINARY INCONTINENCE WITH CONTINUOUS LEAKAGE: ICD-10-CM

## 2017-11-15 NOTE — TELEPHONE ENCOUNTER
Was the patient seen in the last year in this department? Yes     Does patient have an active prescription for medications requested? Yes     Received Request Via: Pharmacy     Last Visit: 7/24/17  Last Labs: 8/30/17

## 2017-11-16 RX ORDER — OXYBUTYNIN CHLORIDE 5 MG/1
TABLET ORAL
Qty: 180 TAB | Refills: 0 | Status: SHIPPED | OUTPATIENT
Start: 2017-11-16 | End: 2018-04-20 | Stop reason: SDUPTHER

## 2017-12-11 RX ORDER — LOSARTAN POTASSIUM 50 MG/1
50 TABLET ORAL
Qty: 90 TAB | Refills: 0 | Status: SHIPPED | OUTPATIENT
Start: 2017-12-11 | End: 2018-03-16 | Stop reason: SDUPTHER

## 2017-12-14 ENCOUNTER — HOSPITAL ENCOUNTER (OUTPATIENT)
Dept: LAB | Facility: MEDICAL CENTER | Age: 67
End: 2017-12-14
Attending: SPECIALIST
Payer: MEDICARE

## 2017-12-14 LAB
ALBUMIN SERPL BCP-MCNC: 3.8 G/DL (ref 3.2–4.9)
ALBUMIN/GLOB SERPL: 1.4 G/DL
ALP SERPL-CCNC: 117 U/L (ref 30–99)
ALT SERPL-CCNC: 8 U/L (ref 2–50)
ANION GAP SERPL CALC-SCNC: 8 MMOL/L (ref 0–11.9)
APPEARANCE UR: CLEAR
AST SERPL-CCNC: 17 U/L (ref 12–45)
BACTERIA #/AREA URNS HPF: NEGATIVE /HPF
BASOPHILS # BLD AUTO: 0.8 % (ref 0–1.8)
BASOPHILS # BLD: 0.03 K/UL (ref 0–0.12)
BILIRUB SERPL-MCNC: 0.3 MG/DL (ref 0.1–1.5)
BILIRUB UR QL STRIP.AUTO: NEGATIVE
BUN SERPL-MCNC: 11 MG/DL (ref 8–22)
CALCIUM SERPL-MCNC: 8.9 MG/DL (ref 8.5–10.5)
CHLORIDE SERPL-SCNC: 108 MMOL/L (ref 96–112)
CO2 SERPL-SCNC: 26 MMOL/L (ref 20–33)
COLOR UR: YELLOW
CREAT SERPL-MCNC: 0.8 MG/DL (ref 0.5–1.4)
EOSINOPHIL # BLD AUTO: 0.19 K/UL (ref 0–0.51)
EOSINOPHIL NFR BLD: 5.2 % (ref 0–6.9)
EPI CELLS #/AREA URNS HPF: ABNORMAL /HPF
ERYTHROCYTE [DISTWIDTH] IN BLOOD BY AUTOMATED COUNT: 45.5 FL (ref 35.9–50)
GFR SERPL CREATININE-BSD FRML MDRD: >60 ML/MIN/1.73 M 2
GLOBULIN SER CALC-MCNC: 2.8 G/DL (ref 1.9–3.5)
GLUCOSE SERPL-MCNC: 96 MG/DL (ref 65–99)
GLUCOSE UR STRIP.AUTO-MCNC: NEGATIVE MG/DL
HCT VFR BLD AUTO: 32.1 % (ref 37–47)
HGB BLD-MCNC: 9.9 G/DL (ref 12–16)
HYALINE CASTS #/AREA URNS LPF: ABNORMAL /LPF
IMM GRANULOCYTES # BLD AUTO: 0.01 K/UL (ref 0–0.11)
IMM GRANULOCYTES NFR BLD AUTO: 0.3 % (ref 0–0.9)
KETONES UR STRIP.AUTO-MCNC: NEGATIVE MG/DL
LEUKOCYTE ESTERASE UR QL STRIP.AUTO: ABNORMAL
LYMPHOCYTES # BLD AUTO: 0.78 K/UL (ref 1–4.8)
LYMPHOCYTES NFR BLD: 21.4 % (ref 22–41)
MCH RBC QN AUTO: 25.7 PG (ref 27–33)
MCHC RBC AUTO-ENTMCNC: 30.8 G/DL (ref 33.6–35)
MCV RBC AUTO: 83.4 FL (ref 81.4–97.8)
MICRO URNS: ABNORMAL
MONOCYTES # BLD AUTO: 0.33 K/UL (ref 0–0.85)
MONOCYTES NFR BLD AUTO: 9 % (ref 0–13.4)
NEUTROPHILS # BLD AUTO: 2.31 K/UL (ref 2–7.15)
NEUTROPHILS NFR BLD: 63.3 % (ref 44–72)
NITRITE UR QL STRIP.AUTO: NEGATIVE
NRBC # BLD AUTO: 0 K/UL
NRBC BLD AUTO-RTO: 0 /100 WBC
PH UR STRIP.AUTO: 5.5 [PH]
PLATELET # BLD AUTO: 248 K/UL (ref 164–446)
PMV BLD AUTO: 10.6 FL (ref 9–12.9)
POTASSIUM SERPL-SCNC: 3.5 MMOL/L (ref 3.6–5.5)
PROT SERPL-MCNC: 6.6 G/DL (ref 6–8.2)
PROT UR QL STRIP: 30 MG/DL
RBC # BLD AUTO: 3.85 M/UL (ref 4.2–5.4)
RBC # URNS HPF: ABNORMAL /HPF
RBC UR QL AUTO: NEGATIVE
SODIUM SERPL-SCNC: 142 MMOL/L (ref 135–145)
SP GR UR STRIP.AUTO: 1.03
UROBILINOGEN UR STRIP.AUTO-MCNC: 0.2 MG/DL
WBC # BLD AUTO: 3.7 K/UL (ref 4.8–10.8)
WBC #/AREA URNS HPF: ABNORMAL /HPF

## 2017-12-14 PROCEDURE — 80053 COMPREHEN METABOLIC PANEL: CPT

## 2017-12-14 PROCEDURE — 85025 COMPLETE CBC W/AUTO DIFF WBC: CPT

## 2017-12-14 PROCEDURE — 87086 URINE CULTURE/COLONY COUNT: CPT

## 2017-12-14 PROCEDURE — 81001 URINALYSIS AUTO W/SCOPE: CPT

## 2017-12-14 PROCEDURE — 36415 COLL VENOUS BLD VENIPUNCTURE: CPT

## 2017-12-16 LAB
BACTERIA UR CULT: ABNORMAL
BACTERIA UR CULT: ABNORMAL
SIGNIFICANT IND 70042: ABNORMAL
SITE SITE: ABNORMAL
SOURCE SOURCE: ABNORMAL

## 2018-02-12 DIAGNOSIS — K21.00 GASTROESOPHAGEAL REFLUX DISEASE WITH ESOPHAGITIS: ICD-10-CM

## 2018-02-12 RX ORDER — OMEPRAZOLE 20 MG/1
CAPSULE, DELAYED RELEASE ORAL
Qty: 90 CAP | Refills: 0 | Status: SHIPPED | OUTPATIENT
Start: 2018-02-12 | End: 2018-07-09 | Stop reason: SDUPTHER

## 2018-02-12 NOTE — TELEPHONE ENCOUNTER
Was the patient seen in the last year in this department? Yes     Does patient have an active prescription for medications requested? Yes     Received Request Via: Pharmacy     Last visit 7/24/2017  Last labs 12/14/2017

## 2018-03-16 NOTE — TELEPHONE ENCOUNTER
Was the patient seen in the last year in this department? Yes     Does patient have an active prescription for medications requested? Yes     Received Request Via: Pharmacy     Last visit  07/24/2017  Last labs 12/14/2017

## 2018-03-19 RX ORDER — LOSARTAN POTASSIUM 50 MG/1
50 TABLET ORAL
Qty: 90 TAB | Refills: 1 | Status: SHIPPED | OUTPATIENT
Start: 2018-03-19 | End: 2019-01-27 | Stop reason: SDUPTHER

## 2018-04-20 DIAGNOSIS — N39.45 URINARY INCONTINENCE WITH CONTINUOUS LEAKAGE: ICD-10-CM

## 2018-04-20 RX ORDER — OXYBUTYNIN CHLORIDE 5 MG/1
TABLET ORAL
Qty: 180 TAB | Refills: 0 | Status: SHIPPED | OUTPATIENT
Start: 2018-04-20 | End: 2018-07-31 | Stop reason: SDUPTHER

## 2018-04-27 NOTE — CARE PLAN
Problem: Pain Management  Goal: Pain level will decrease to patient’s comfort goal  Pain meds provided as needed, pain controlled with 2 mg morphine, will continue to monitor.         [phone]

## 2018-07-05 LAB
GRAM STN SPEC: NORMAL
SIGNIFICANT IND 70042: NORMAL
SITE SITE: NORMAL
SOURCE SOURCE: NORMAL

## 2018-07-09 DIAGNOSIS — K21.00 GASTROESOPHAGEAL REFLUX DISEASE WITH ESOPHAGITIS: ICD-10-CM

## 2018-07-09 RX ORDER — OMEPRAZOLE 20 MG/1
20 CAPSULE, DELAYED RELEASE ORAL
Qty: 90 CAP | Refills: 0 | Status: SHIPPED | OUTPATIENT
Start: 2018-07-09 | End: 2018-10-29 | Stop reason: SDUPTHER

## 2018-07-19 ENCOUNTER — OFFICE VISIT (OUTPATIENT)
Dept: MEDICAL GROUP | Facility: CLINIC | Age: 68
End: 2018-07-19
Payer: MEDICARE

## 2018-07-19 VITALS
HEART RATE: 96 BPM | SYSTOLIC BLOOD PRESSURE: 132 MMHG | DIASTOLIC BLOOD PRESSURE: 76 MMHG | TEMPERATURE: 99.1 F | RESPIRATION RATE: 18 BRPM | BODY MASS INDEX: 31.92 KG/M2 | OXYGEN SATURATION: 95 % | WEIGHT: 187 LBS | HEIGHT: 64 IN

## 2018-07-19 DIAGNOSIS — G47.01 INSOMNIA DUE TO MEDICAL CONDITION: ICD-10-CM

## 2018-07-19 DIAGNOSIS — I83.93 VARICOSE VEINS OF BOTH LOWER EXTREMITIES: ICD-10-CM

## 2018-07-19 DIAGNOSIS — R26.81 UNSTEADY GAIT: ICD-10-CM

## 2018-07-19 DIAGNOSIS — I10 ESSENTIAL HYPERTENSION: ICD-10-CM

## 2018-07-19 DIAGNOSIS — Z12.39 BREAST CANCER SCREENING: ICD-10-CM

## 2018-07-19 DIAGNOSIS — F32.A DEPRESSION, UNSPECIFIED DEPRESSION TYPE: ICD-10-CM

## 2018-07-19 PROBLEM — M19.012 OSTEOARTHRITIS OF LEFT SHOULDER: Status: ACTIVE | Noted: 2018-07-19

## 2018-07-19 PROBLEM — Z86.010 HISTORY OF COLON POLYPS: Status: ACTIVE | Noted: 2018-07-19

## 2018-07-19 PROBLEM — Z85.44 HISTORY OF CANCER OF VULVA: Status: ACTIVE | Noted: 2018-07-19

## 2018-07-19 PROCEDURE — 99214 OFFICE O/P EST MOD 30 MIN: CPT | Performed by: FAMILY MEDICINE

## 2018-07-19 RX ORDER — SERTRALINE HYDROCHLORIDE 100 MG/1
150 TABLET, FILM COATED ORAL DAILY
Qty: 235 TAB | Refills: 1 | Status: SHIPPED | OUTPATIENT
Start: 2018-07-19 | End: 2019-03-22 | Stop reason: SDUPTHER

## 2018-07-19 RX ORDER — TRAZODONE HYDROCHLORIDE 150 MG/1
150 TABLET ORAL
Qty: 90 TAB | Refills: 1 | Status: SHIPPED | OUTPATIENT
Start: 2018-07-19 | End: 2019-03-22 | Stop reason: SDUPTHER

## 2018-07-19 ASSESSMENT — PAIN SCALES - GENERAL: PAINLEVEL: 4=SLIGHT-MODERATE PAIN

## 2018-07-19 ASSESSMENT — PATIENT HEALTH QUESTIONNAIRE - PHQ9
CLINICAL INTERPRETATION OF PHQ2 SCORE: 2
SUM OF ALL RESPONSES TO PHQ QUESTIONS 1-9: 8
5. POOR APPETITE OR OVEREATING: 0 - NOT AT ALL

## 2018-07-19 NOTE — ASSESSMENT & PLAN NOTE
Poor eyesight, left leg a bit wobbly at times, has stumbled without falling. Has cane, but does not use it.

## 2018-10-29 DIAGNOSIS — K21.00 GASTROESOPHAGEAL REFLUX DISEASE WITH ESOPHAGITIS: ICD-10-CM

## 2018-10-29 RX ORDER — OMEPRAZOLE 20 MG/1
20 CAPSULE, DELAYED RELEASE ORAL
Qty: 90 CAP | Refills: 0 | Status: SHIPPED | OUTPATIENT
Start: 2018-10-29 | End: 2019-03-22 | Stop reason: SDUPTHER

## 2018-10-29 NOTE — TELEPHONE ENCOUNTER
Was the patient seen in the last year in this department? Yes    Does patient have an active prescription for medications requested? Yes    Received Request Via: Pharmacy     Last OV: 7/19/2018  Last Lab:  Component      Latest Ref Rng & Units 8/30/2017 8/30/2017           1:50 PM  1:50 PM   WBC      4.8 - 10.8 K/uL     RBC      4.20 - 5.40 M/uL     Hemoglobin      12.0 - 16.0 g/dL     Hematocrit      37.0 - 47.0 %     MCV      81.4 - 97.8 fL     MCH      27.0 - 33.0 pg     MCHC      33.6 - 35.0 g/dL     RDW      35.9 - 50.0 fL     Platelet Count      164 - 446 K/uL     MPV      9.0 - 12.9 fL     Neutrophils-Polys      44.00 - 72.00 %     Lymphocytes      22.00 - 41.00 %     Monocytes      0.00 - 13.40 %     Eosinophils      0.00 - 6.90 %     Basophils      0.00 - 1.80 %     Immature Granulocytes      0.00 - 0.90 %     Nucleated RBC      /100 WBC     Neutrophils (Absolute)      2.00 - 7.15 K/uL     Lymphs (Absolute)      1.00 - 4.80 K/uL     Monos (Absolute)      0.00 - 0.85 K/uL     Eos (Absolute)      0.00 - 0.51 K/uL     Baso (Absolute)      0.00 - 0.12 K/uL     Immature Granulocytes (abs)      0.00 - 0.11 K/uL     NRBC (Absolute)      K/uL     Sodium      135 - 145 mmol/L     Potassium      3.6 - 5.5 mmol/L     Chloride      96 - 112 mmol/L     Co2      20 - 33 mmol/L     Anion Gap      0.0 - 11.9     Glucose      65 - 99 mg/dL     Bun      8 - 22 mg/dL     Creatinine      0.50 - 1.40 mg/dL     Calcium      8.5 - 10.5 mg/dL     AST(SGOT)      12 - 45 U/L     ALT(SGPT)      2 - 50 U/L     Alkaline Phosphatase      30 - 99 U/L     Total Bilirubin      0.1 - 1.5 mg/dL     Albumin      3.2 - 4.9 g/dL     Total Protein      6.0 - 8.2 g/dL     Globulin      1.9 - 3.5 g/dL     A-G Ratio      g/dL     Fluid Type       Synovial    Color-Body Fluid       Red    Character-Body Fluid       Bloody    Total RBC Count      cells/uL 525,000    Total WBC      cells/uL 2,340    Polys      % 83    Lymphs      % 11    Mesothelial  Cells - CSF      % 5    Unidentified Cells - Fluid      % 1    Comments       see below    Significant Indicator       . NEG   Source       SYNO SYNO   Site       Left Hip Left Hip   Culture Result Bdf        No growth at 72 hours.   Gram Stain Result       Rare WBCs. . . . Rare WBCs. . . .   Pathologist'S Interpretation           Reviewed By -Fluids           GFR If African American      >60 mL/min/1.73 m 2     GFR If Non African American      >60 mL/min/1.73 m 2       Component      Latest Ref Rng & Units 8/30/2017 8/31/2017 12/14/2017           1:50 PM     WBC      4.8 - 10.8 K/uL   3.7 (L)   RBC      4.20 - 5.40 M/uL   3.85 (L)   Hemoglobin      12.0 - 16.0 g/dL   9.9 (L)   Hematocrit      37.0 - 47.0 %   32.1 (L)   MCV      81.4 - 97.8 fL   83.4   MCH      27.0 - 33.0 pg   25.7 (L)   MCHC      33.6 - 35.0 g/dL   30.8 (L)   RDW      35.9 - 50.0 fL   45.5   Platelet Count      164 - 446 K/uL   248   MPV      9.0 - 12.9 fL   10.6   Neutrophils-Polys      44.00 - 72.00 %   63.30   Lymphocytes      22.00 - 41.00 %   21.40 (L)   Monocytes      0.00 - 13.40 %   9.00   Eosinophils      0.00 - 6.90 %   5.20   Basophils      0.00 - 1.80 %   0.80   Immature Granulocytes      0.00 - 0.90 %   0.30   Nucleated RBC      /100 WBC   0.00   Neutrophils (Absolute)      2.00 - 7.15 K/uL   2.31   Lymphs (Absolute)      1.00 - 4.80 K/uL   0.78 (L)   Monos (Absolute)      0.00 - 0.85 K/uL   0.33   Eos (Absolute)      0.00 - 0.51 K/uL   0.19   Baso (Absolute)      0.00 - 0.12 K/uL   0.03   Immature Granulocytes (abs)      0.00 - 0.11 K/uL   0.01   NRBC (Absolute)      K/uL   0.00   Sodium      135 - 145 mmol/L   142   Potassium      3.6 - 5.5 mmol/L   3.5 (L)   Chloride      96 - 112 mmol/L   108   Co2      20 - 33 mmol/L   26   Anion Gap      0.0 - 11.9   8.0   Glucose      65 - 99 mg/dL   96   Bun      8 - 22 mg/dL   11   Creatinine      0.50 - 1.40 mg/dL   0.80   Calcium      8.5 - 10.5 mg/dL   8.9   AST(SGOT)      12 - 45 U/L   17    ALT(SGPT)      2 - 50 U/L   8   Alkaline Phosphatase      30 - 99 U/L   117 (H)   Total Bilirubin      0.1 - 1.5 mg/dL   0.3   Albumin      3.2 - 4.9 g/dL   3.8   Total Protein      6.0 - 8.2 g/dL   6.6   Globulin      1.9 - 3.5 g/dL   2.8   A-G Ratio      g/dL   1.4   Fluid Type            Color-Body Fluid            Character-Body Fluid            Total RBC Count      cells/uL      Total WBC      cells/uL      Polys      %      Lymphs      %      Mesothelial Cells - CSF      %      Unidentified Cells - Fluid      %      Comments            Significant Indicator            Source            Site            Culture Result Bdf       No growth at 72 hours.     Gram Stain Result            Pathologist'S Interpretation        SEE COMMENT    Reviewed By -Fluids        see below    GFR If African American      >60 mL/min/1.73 m 2   >60   GFR If Non African American      >60 mL/min/1.73 m 2   >60

## 2018-11-05 DIAGNOSIS — N39.45 URINARY INCONTINENCE WITH CONTINUOUS LEAKAGE: ICD-10-CM

## 2018-11-05 RX ORDER — OXYBUTYNIN CHLORIDE 5 MG/1
TABLET ORAL
Qty: 180 TAB | Refills: 0 | Status: SHIPPED | OUTPATIENT
Start: 2018-11-05 | End: 2019-10-22 | Stop reason: SDUPTHER

## 2018-12-20 ENCOUNTER — OFFICE VISIT (OUTPATIENT)
Dept: URGENT CARE | Facility: PHYSICIAN GROUP | Age: 68
End: 2018-12-20
Payer: MEDICARE

## 2018-12-20 VITALS
SYSTOLIC BLOOD PRESSURE: 176 MMHG | RESPIRATION RATE: 16 BRPM | BODY MASS INDEX: 31.92 KG/M2 | OXYGEN SATURATION: 96 % | HEIGHT: 64 IN | HEART RATE: 52 BPM | DIASTOLIC BLOOD PRESSURE: 90 MMHG | TEMPERATURE: 98.9 F | WEIGHT: 187 LBS

## 2018-12-20 DIAGNOSIS — H02.846 SWELLING OF EYELID, LEFT: ICD-10-CM

## 2018-12-20 DIAGNOSIS — L03.90 CELLULITIS, UNSPECIFIED CELLULITIS SITE: ICD-10-CM

## 2018-12-20 PROCEDURE — 99214 OFFICE O/P EST MOD 30 MIN: CPT | Performed by: NURSE PRACTITIONER

## 2018-12-20 RX ORDER — TOBRAMYCIN 3 MG/ML
1 SOLUTION/ DROPS OPHTHALMIC 4 TIMES DAILY
Qty: 1 BOTTLE | Refills: 0 | Status: SHIPPED | OUTPATIENT
Start: 2018-12-20 | End: 2018-12-25

## 2018-12-20 ASSESSMENT — ENCOUNTER SYMPTOMS
DIZZINESS: 0
EYE DISCHARGE: 0
GASTROINTESTINAL NEGATIVE: 1
MUSCULOSKELETAL NEGATIVE: 1
RESPIRATORY NEGATIVE: 1
BLURRED VISION: 0
SENSORY CHANGE: 0
EYE ITCHING: 1
EYE REDNESS: 0
FEVER: 0
PSYCHIATRIC NEGATIVE: 1
NEUROLOGICAL NEGATIVE: 1
FOREIGN BODY SENSATION: 0
CONSTITUTIONAL NEGATIVE: 1
SHORTNESS OF BREATH: 0
CARDIOVASCULAR NEGATIVE: 1
FOCAL WEAKNESS: 0

## 2018-12-21 NOTE — PROGRESS NOTES
Subjective:     Shashank Christian is a 68 y.o. female who presents for Itchy Eye (Left eye )       Eye Problem    The left eye is affected. This is a new problem. Episode onset: 2 months. The problem has been unchanged. There was no injury mechanism. The patient is experiencing no pain. She does not wear contacts. Associated symptoms include itching. Pertinent negatives include no blurred vision, eye discharge, eye redness, fever or foreign body sensation. She has tried eye drops for the symptoms. The treatment provided no relief.   Rash   This is a new problem. The current episode started more than 1 month ago. The problem has been waxing and waning since onset. The affected locations include the scalp, face and neck. The rash is characterized by itchiness, redness and scaling. She was exposed to nothing. Pertinent negatives include no fever or shortness of breath. Past treatments include nothing.     PMH:  has a past medical history of Depression (7/21/2015); GERD (gastroesophageal reflux disease); Hypertension; Insomnia (7/21/2015); Urinary incontinence with continuous leakage; Vitamin D deficiency (7/21/2015); and Vulvar cancer ([facility]).    MEDS:   Current Outpatient Prescriptions:   •  tobramycin (TOBREX) 0.3 % Solution ophthalmic solution, Place 1 Drop in left eye 4 times a day for 5 days., Disp: 1 Bottle, Rfl: 0  •  dicloxacillin (DYNAPEN) 500 MG Cap, Take 1 Cap by mouth 4 times a day for 7 days., Disp: 28 Cap, Rfl: 0  •  oxybutynin (DITROPAN) 5 MG Tab, TAKE 2 TABLETS BY MOUTH EVERY NIGHT AT BEDTIME, Disp: 180 Tab, Rfl: 0  •  omeprazole (PRILOSEC) 20 MG delayed-release capsule, Take 1 Cap by mouth every day., Disp: 90 Cap, Rfl: 0  •  traZODone (DESYREL) 150 MG Tab, Take 1 Tab by mouth at bedtime as needed for Sleep., Disp: 90 Tab, Rfl: 1  •  sertraline (ZOLOFT) 100 MG Tab, Take 1.5 Tabs by mouth every day., Disp: 235 Tab, Rfl: 1  •  losartan (COZAAR) 50 MG Tab, Take 1 Tab by mouth every day., Disp: 90 Tab,  Rfl: 1  •  oxycodone-acetaminophen (PERCOCET-10)  MG Tab, Take 1-2 Tabs by mouth every four hours as needed for Severe Pain., Disp: , Rfl:   •  traZODone (DESYREL) 150 MG Tab, TAKE 1 TABLET BY MOUTH AT BEDTIME AS NEEDED FOR SLEEP (Patient not taking: Reported on 12/20/2018), Disp: 90 Tab, Rfl: 0    ALLERGIES: No Known Allergies    SURGHX:   Past Surgical History:   Procedure Laterality Date   • IRRIGATION & DEBRIDEMENT ORTHO Left 3/19/2017    Procedure: IRRIGATION & DEBRIDEMENT ORTHO KNEE;  Surgeon: Rigo Irving M.D.;  Location: SURGERY [facility];  Service:    • KNEE REVISION TOTAL  3/19/2017    Procedure: KNEE REVISION TOTAL FOR POLYETHYLENE LINER EXCHANGE;  Surgeon: Rigo Irving M.D.;  Location: SURGERY [facility];  Service:    • IRRIGATION & DEBRIDEMENT ORTHO Left 3/16/2017    Procedure: IRRIGATION & DEBRIDEMENT ORTHO-THIGH AND KNEE;  Surgeon: Rigo Irving M.D.;  Location: SURGERY [facility];  Service:    • HARDWARE REMOVAL ORTHO Left 3/16/2017    Procedure: HARDWARE REMOVAL ORTHO-THIGH;  Surgeon: Rigo Irving M.D.;  Location: SURGERY [facility];  Service:    • ABDOMINAL EXPLORATION       SOCHX:  reports that she has never smoked. She has never used smokeless tobacco. She reports that she does not drink alcohol or use drugs.    FH: Reviewed with patient, not pertinent to this visit.     Review of Systems   Constitutional: Negative.  Negative for fever and malaise/fatigue.   HENT: Negative.    Eyes: Positive for itching. Negative for blurred vision, discharge and redness.   Respiratory: Negative.  Negative for shortness of breath.    Cardiovascular: Negative.  Negative for chest pain.   Gastrointestinal: Negative.    Genitourinary: Negative.    Musculoskeletal: Negative.    Skin: Positive for itching and rash.   Neurological: Negative.  Negative for dizziness, sensory change and focal weakness.   Psychiatric/Behavioral: Negative.    All other systems reviewed and are  "negative.    Objective:     BP (!) 176/90   Pulse (!) 52   Temp 37.2 °C (98.9 °F)   Resp 16   Ht 1.626 m (5' 4\")   Wt 84.8 kg (187 lb)   LMP 10/01/1990   SpO2 96%   BMI 32.10 kg/m²     Physical Exam   Constitutional: She is oriented to person, place, and time. She appears well-developed and well-nourished.   HENT:   Head: Normocephalic and atraumatic.   Right Ear: External ear normal.   Left Ear: External ear normal.   Nose: Nose normal.   Mouth/Throat: Oropharynx is clear and moist and mucous membranes are normal.   Eyes: Pupils are equal, round, and reactive to light. Conjunctivae and EOM are normal.       Neck: Normal range of motion.   Cardiovascular: Regular rhythm, normal heart sounds and intact distal pulses.    Pulmonary/Chest: Effort normal and breath sounds normal. No respiratory distress.   Abdominal: Soft. Bowel sounds are normal.   Musculoskeletal: Normal range of motion. She exhibits no deformity.   Neurological: She is alert and oriented to person, place, and time. She has normal strength. No sensory deficit.   Skin: Skin is warm and dry. Capillary refill takes less than 2 seconds.   Erythematous scaly raised lesions on back of neck and scalp and on forehead   Psychiatric: She has a normal mood and affect.   Vitals reviewed.       Assessment/Plan:     1. Cellulitis, unspecified cellulitis site  - dicloxacillin (DYNAPEN) 500 MG Cap; Take 1 Cap by mouth 4 times a day for 7 days.  Dispense: 28 Cap; Refill: 0  - REFERRAL TO DERMATOLOGY    2. Swelling of eyelid, left  - tobramycin (TOBREX) 0.3 % Solution ophthalmic solution; Place 1 Drop in left eye 4 times a day for 5 days.  Dispense: 1 Bottle; Refill: 0  - REFERRAL TO OPHTHALMOLOGY    Stable problems that have been occurring for more than 1 month. Referrals sent. Rx sent electronically. Pt states Keflex not covered by insurance as well as intolerance to doxycycline.    Otherwise, patient advised to: Return for 1) Symptoms that change or worsen, " or go to the ER, 2) Follow up with primary care in 7-10 days.    Differential diagnosis, natural history, supportive care, and indications for immediate follow-up discussed. All questions answered. Patient agrees with the plan of care.

## 2019-03-22 ENCOUNTER — OFFICE VISIT (OUTPATIENT)
Dept: MEDICAL GROUP | Facility: CLINIC | Age: 69
End: 2019-03-22
Payer: MEDICARE

## 2019-03-22 VITALS
DIASTOLIC BLOOD PRESSURE: 86 MMHG | HEIGHT: 65 IN | SYSTOLIC BLOOD PRESSURE: 168 MMHG | BODY MASS INDEX: 30.82 KG/M2 | TEMPERATURE: 98.6 F | RESPIRATION RATE: 14 BRPM | WEIGHT: 185 LBS | OXYGEN SATURATION: 92 % | HEART RATE: 78 BPM

## 2019-03-22 DIAGNOSIS — H57.12 EYE DISCOMFORT, LEFT: ICD-10-CM

## 2019-03-22 DIAGNOSIS — M00.062 STAPHYLOCOCCAL ARTHRITIS OF LEFT KNEE (HCC): ICD-10-CM

## 2019-03-22 DIAGNOSIS — K21.00 GASTROESOPHAGEAL REFLUX DISEASE WITH ESOPHAGITIS: ICD-10-CM

## 2019-03-22 DIAGNOSIS — M54.2 NECK PAIN: ICD-10-CM

## 2019-03-22 DIAGNOSIS — I10 ESSENTIAL HYPERTENSION: ICD-10-CM

## 2019-03-22 DIAGNOSIS — F32.A DEPRESSION, UNSPECIFIED DEPRESSION TYPE: ICD-10-CM

## 2019-03-22 DIAGNOSIS — G47.01 INSOMNIA DUE TO MEDICAL CONDITION: ICD-10-CM

## 2019-03-22 PROBLEM — M19.012 OSTEOARTHRITIS OF LEFT SHOULDER: Status: RESOLVED | Noted: 2018-07-19 | Resolved: 2019-03-22

## 2019-03-22 PROBLEM — Z96.612 HISTORY OF TOTAL REPLACEMENT OF LEFT SHOULDER JOINT: Status: ACTIVE | Noted: 2019-03-22

## 2019-03-22 PROBLEM — N32.81 OVERACTIVE BLADDER: Status: ACTIVE | Noted: 2019-03-22

## 2019-03-22 PROBLEM — R26.81 UNSTEADY GAIT: Status: RESOLVED | Noted: 2018-07-19 | Resolved: 2019-03-22

## 2019-03-22 PROCEDURE — 99214 OFFICE O/P EST MOD 30 MIN: CPT | Performed by: FAMILY MEDICINE

## 2019-03-22 RX ORDER — TRAZODONE HYDROCHLORIDE 150 MG/1
150 TABLET ORAL
Qty: 90 TAB | Refills: 1 | Status: SHIPPED | OUTPATIENT
Start: 2019-03-22 | End: 2019-10-21 | Stop reason: SDUPTHER

## 2019-03-22 RX ORDER — OMEPRAZOLE 20 MG/1
20 CAPSULE, DELAYED RELEASE ORAL
Qty: 30 CAP | Refills: 0 | Status: SHIPPED | OUTPATIENT
Start: 2019-03-22 | End: 2019-04-04 | Stop reason: SDUPTHER

## 2019-03-22 RX ORDER — TRAMADOL HYDROCHLORIDE 50 MG/1
TABLET ORAL
COMMUNITY
Start: 2019-03-11 | End: 2019-04-19

## 2019-03-22 RX ORDER — AMLODIPINE BESYLATE 5 MG/1
5 TABLET ORAL DAILY
Qty: 30 TAB | Refills: 0 | Status: SHIPPED | OUTPATIENT
Start: 2019-03-22 | End: 2019-06-07 | Stop reason: SDUPTHER

## 2019-03-22 RX ORDER — OMEPRAZOLE 20 MG/1
20 CAPSULE, DELAYED RELEASE ORAL
Qty: 90 CAP | Refills: 11 | Status: SHIPPED | OUTPATIENT
Start: 2019-03-22 | End: 2019-03-22 | Stop reason: SDUPTHER

## 2019-03-22 RX ORDER — AMLODIPINE BESYLATE 5 MG/1
5 TABLET ORAL DAILY
Qty: 90 TAB | Refills: 1 | Status: SHIPPED | OUTPATIENT
Start: 2019-03-22 | End: 2019-03-22 | Stop reason: SDUPTHER

## 2019-03-22 RX ORDER — SERTRALINE HYDROCHLORIDE 100 MG/1
100 TABLET, FILM COATED ORAL DAILY
Qty: 90 TAB | Refills: 1 | Status: SHIPPED | OUTPATIENT
Start: 2019-03-22 | End: 2019-10-21 | Stop reason: SDUPTHER

## 2019-03-22 RX ORDER — LOSARTAN POTASSIUM 100 MG/1
100 TABLET ORAL
Qty: 90 TAB | Refills: 1 | Status: SHIPPED | OUTPATIENT
Start: 2019-03-22 | End: 2019-10-21 | Stop reason: SDUPTHER

## 2019-03-22 RX ORDER — MELOXICAM 7.5 MG/1
TABLET ORAL
COMMUNITY
Start: 2019-02-23 | End: 2019-04-19

## 2019-03-22 RX ORDER — CEPHALEXIN 250 MG/1
CAPSULE ORAL
COMMUNITY
Start: 2019-03-07 | End: 2019-04-19

## 2019-03-22 NOTE — ASSESSMENT & PLAN NOTE
For past couple months has been experiencing discomfort in AM. Worse in AM. Says its located in upper inner part of eye. Vision is blurred. Had cataract surgery eyes years ago. Seen UC nothing found apparently. Has not had formal exam in years.

## 2019-03-22 NOTE — PROGRESS NOTES
Complaint: F/u BP     Subjective:     Shashank Christian is a 68 y.o. female here today for f/u of her BP.    Eye discomfort, left  For past couple months has been experiencing discomfort in AM. Worse in AM. Says its located in upper inner part of eye. Vision is blurred. Had cataract surgery eyes years ago. Seen UC nothing found apparently. Has not had formal exam in years.    HTN (hypertension)  Currently on Cozaar 50 mg only.    Insomnia due to medical condition  Sleeps well on Trazodone 150 mg, needs refill.    Neck pain  Neck pain which started after shoulder pain, resolved now after shoulder replacement surgery. Had been placed on Mobic by Dr. Nuñez.    Staphylococcal arthritis of left knee (HCC)  Will be on antibiotic life-long.     No other concerns or complaints.    Current medicines (including changes today)  Current Outpatient Prescriptions   Medication Sig Dispense Refill   • meloxicam (MOBIC) 7.5 MG Tab      • cephALEXin (KEFLEX) 250 MG Cap      • tramadol (ULTRAM) 50 MG Tab      • losartan (COZAAR) 100 MG Tab Take 1 Tab by mouth every day. 90 Tab 1   • sertraline (ZOLOFT) 100 MG Tab Take 1 Tab by mouth every day. 90 Tab 1   • traZODone (DESYREL) 150 MG Tab Take 1 Tab by mouth at bedtime as needed for Sleep. 90 Tab 1   • amLODIPine (NORVASC) 5 MG Tab Take 1 Tab by mouth every day. 30 Tab 0   • omeprazole (PRILOSEC) 20 MG delayed-release capsule Take 1 Cap by mouth every day. 30 Cap 0   • oxybutynin (DITROPAN) 5 MG Tab TAKE 2 TABLETS BY MOUTH EVERY NIGHT AT BEDTIME 180 Tab 0   • oxycodone-acetaminophen (PERCOCET-10)  MG Tab Take 1-2 Tabs by mouth every four hours as needed for Severe Pain.       No current facility-administered medications for this visit.      She  has a past medical history of Depression (7/21/2015); GERD (gastroesophageal reflux disease); Hypertension; Insomnia (7/21/2015); Overactive bladder; Urinary incontinence with continuous leakage; Vitamin D deficiency (7/21/2015); and  Vulvar cancer (HCC).    Health Maintenance: declines mammogram      Allergies: Patient has no known allergies.    Current Outpatient Prescriptions Ordered in [facility]   Medication Sig Dispense Refill   • meloxicam (MOBIC) 7.5 MG Tab      • cephALEXin (KEFLEX) 250 MG Cap      • tramadol (ULTRAM) 50 MG Tab      • losartan (COZAAR) 100 MG Tab Take 1 Tab by mouth every day. 90 Tab 1   • sertraline (ZOLOFT) 100 MG Tab Take 1 Tab by mouth every day. 90 Tab 1   • traZODone (DESYREL) 150 MG Tab Take 1 Tab by mouth at bedtime as needed for Sleep. 90 Tab 1   • amLODIPine (NORVASC) 5 MG Tab Take 1 Tab by mouth every day. 30 Tab 0   • omeprazole (PRILOSEC) 20 MG delayed-release capsule Take 1 Cap by mouth every day. 30 Cap 0   • oxybutynin (DITROPAN) 5 MG Tab TAKE 2 TABLETS BY MOUTH EVERY NIGHT AT BEDTIME 180 Tab 0   • oxycodone-acetaminophen (PERCOCET-10)  MG Tab Take 1-2 Tabs by mouth every four hours as needed for Severe Pain.       No current Epic-ordered facility-administered medications on file.        Past Medical History:   Diagnosis Date   • Depression 7/21/2015   • GERD (gastroesophageal reflux disease)    • Hypertension    • Insomnia 7/21/2015   • Overactive bladder    • Urinary incontinence with continuous leakage    • Vitamin D deficiency 7/21/2015   • Vulvar cancer (HCC)     had chemo and radiation and then surgery for recurrence, Dr. SOMMER       Past Surgical History:   Procedure Laterality Date   • IRRIGATION & DEBRIDEMENT ORTHO Left 3/19/2017    Procedure: IRRIGATION & DEBRIDEMENT ORTHO KNEE;  Surgeon: Rigo Irving M.D.;  Location: [facility];  Service:    • KNEE REVISION TOTAL  3/19/2017    Procedure: KNEE REVISION TOTAL FOR POLYETHYLENE LINER EXCHANGE;  Surgeon: Rigo Irving M.D.;  Location: [facility];  Service:    • IRRIGATION & DEBRIDEMENT ORTHO Left 3/16/2017    Procedure: IRRIGATION & DEBRIDEMENT ORTHO-THIGH AND KNEE;  Surgeon: Rigo Irving M.D.;  Location:  "SURGERY [facility];  Service:    • HARDWARE REMOVAL ORTHO Left 3/16/2017    Procedure: HARDWARE REMOVAL ORTHO-THIGH;  Surgeon: Rigo Irving M.D.;  Location: SURGERY [facility];  Service:    • ABDOMINAL EXPLORATION     • SHOULDER ARTHROPLASTY TOTAL Left        Social History   Substance Use Topics   • Smoking status: Never Smoker   • Smokeless tobacco: Never Used   • Alcohol use No       Social History     Social History Narrative   • No narrative on file       Family History   Problem Relation Age of Onset   • Cancer Mother    • Heart Disease Father          ROS Positive for pain in left knee  Patient denies any fever, chills, unintentional weight gain/loss, fatigue, stroke symptoms, dizziness, headache, nasal congestion, sore-throat, cough, heartburn, chest pain, difficulty breathing, abdominal discomfort, diarrhea/constipation, burning with urination or frequency, back pain, skin rashes, depression or anxiety.       Objective:     Blood pressure (!) 168/86, pulse 78, temperature 37 °C (98.6 °F), resp. rate 14, height 1.651 m (5' 5\"), weight 83.9 kg (185 lb), last menstrual period 10/01/1990, SpO2 92 %, not currently breastfeeding. Body mass index is 30.79 kg/m².   Physical Exam:  Constitutional: Alert, no distress.  Skin: Warm, dry, good turgor, no rashes in visible areas.  Eye: Equal, round and reactive, conjunctivae clear, lids normal. Left fundus with sharp disc.   ENMT: Lips without lesions, oropharynx clear.  Neck: FROM without discomfort, no pont tenderness, Trachea midline, no masses, no thyromegaly. No cervical or supraclavicular lymphadenopathy  Respiratory: Unlabored respiratory effort, lungs clear to auscultation, no wheezes, no ronchi.  Cardiovascular: Normal S1, S2, no murmur, no extremity edema.  Psych: Alert and oriented x3, appropriate affect and mood.        Assessment and Plan:   The following treatment plan was discussed    1. Neck pain  Chronic problem. Improved. Continue Mobic " prn    2. Eye discomfort, left  Chronic problem.No source found upon my exam.  - REFERRAL TO OPTOMETRY    3. Essential hypertension  Chronic problem. Uncontrolled. Will increase Cozaar, add Norvasc. Patient to monitor BP at home.  - losartan (COZAAR) 100 MG Tab; Take 1 Tab by mouth every day.  Dispense: 90 Tab; Refill: 1  - amLODIPine (NORVASC) 5 MG Tab; Take 1 Tab by mouth every day.  Dispense: 30 Tab; Refill: 0    4. Depression, unspecified depression type  Chronic, improved on med.  - sertraline (ZOLOFT) 100 MG Tab; Take 1 Tab by mouth every day.  Dispense: 90 Tab; Refill: 1    5. Insomnia due to medical condition  Improved on med.  - traZODone (DESYREL) 150 MG Tab; Take 1 Tab by mouth at bedtime as needed for Sleep.  Dispense: 90 Tab; Refill: 1    6. Gastroesophageal reflux disease with esophagitis  Chronic, improved on med.  - omeprazole (PRILOSEC) 20 MG delayed-release capsule; Take 1 Cap by mouth every day.  Dispense: 30 Cap; Refill: 0      Followup: Return in about 4 weeks (around 4/19/2019), or BP.    Please note that this dictation was created using voice recognition software. I have made every reasonable attempt to correct obvious errors, but I expect that there are errors of grammar and possibly content that I did not discover before finalizing the note.

## 2019-03-22 NOTE — ASSESSMENT & PLAN NOTE
Neck pain which started after shoulder pain, resolved now after shoulder replacement surgery. Had been placed on Mobic by Dr. Nuñez.

## 2019-04-04 DIAGNOSIS — K21.00 GASTROESOPHAGEAL REFLUX DISEASE WITH ESOPHAGITIS: ICD-10-CM

## 2019-04-04 RX ORDER — OMEPRAZOLE 20 MG/1
20 CAPSULE, DELAYED RELEASE ORAL
Qty: 30 CAP | Refills: 5 | Status: SHIPPED | OUTPATIENT
Start: 2019-04-04 | End: 2019-11-15 | Stop reason: SDUPTHER

## 2019-04-04 NOTE — TELEPHONE ENCOUNTER
Was the patient seen in the last year in this department? Yes 3/22/19    Does patient have an active prescription for medications requested? Yes    Received Request Via: Pharmacy     Labs in media

## 2019-04-19 ENCOUNTER — OFFICE VISIT (OUTPATIENT)
Dept: MEDICAL GROUP | Facility: CLINIC | Age: 69
End: 2019-04-19
Payer: MEDICARE

## 2019-04-19 ENCOUNTER — HOSPITAL ENCOUNTER (OUTPATIENT)
Facility: MEDICAL CENTER | Age: 69
End: 2019-04-19
Attending: FAMILY MEDICINE
Payer: MEDICARE

## 2019-04-19 VITALS
OXYGEN SATURATION: 94 % | WEIGHT: 181 LBS | SYSTOLIC BLOOD PRESSURE: 132 MMHG | HEART RATE: 94 BPM | TEMPERATURE: 99 F | BODY MASS INDEX: 29.09 KG/M2 | HEIGHT: 66 IN | DIASTOLIC BLOOD PRESSURE: 80 MMHG | RESPIRATION RATE: 16 BRPM

## 2019-04-19 DIAGNOSIS — Z85.44 HISTORY OF CANCER OF VULVA: ICD-10-CM

## 2019-04-19 DIAGNOSIS — K62.5 RECTAL BLEEDING: ICD-10-CM

## 2019-04-19 DIAGNOSIS — I10 ESSENTIAL HYPERTENSION: ICD-10-CM

## 2019-04-19 DIAGNOSIS — R63.4 WEIGHT LOSS: ICD-10-CM

## 2019-04-19 LAB
BASOPHILS # BLD AUTO: 1 % (ref 0–1.8)
BASOPHILS # BLD: 0.04 K/UL (ref 0–0.12)
COMMENT 1642: NORMAL
EOSINOPHIL # BLD AUTO: 0.19 K/UL (ref 0–0.51)
EOSINOPHIL NFR BLD: 4.5 % (ref 0–6.9)
ERYTHROCYTE [DISTWIDTH] IN BLOOD BY AUTOMATED COUNT: 46.8 FL (ref 35.9–50)
HCT VFR BLD AUTO: 32.1 % (ref 37–47)
HGB BLD-MCNC: 9.5 G/DL (ref 12–16)
IMM GRANULOCYTES # BLD AUTO: 0.01 K/UL (ref 0–0.11)
IMM GRANULOCYTES NFR BLD AUTO: 0.2 % (ref 0–0.9)
LYMPHOCYTES # BLD AUTO: 0.68 K/UL (ref 1–4.8)
LYMPHOCYTES NFR BLD: 16.2 % (ref 22–41)
MCH RBC QN AUTO: 24.5 PG (ref 27–33)
MCHC RBC AUTO-ENTMCNC: 29.6 G/DL (ref 33.6–35)
MCV RBC AUTO: 82.9 FL (ref 81.4–97.8)
MONOCYTES # BLD AUTO: 0.25 K/UL (ref 0–0.85)
MONOCYTES NFR BLD AUTO: 5.9 % (ref 0–13.4)
MORPHOLOGY BLD-IMP: NORMAL
NEUTROPHILS # BLD AUTO: 3.04 K/UL (ref 2–7.15)
NEUTROPHILS NFR BLD: 72.2 % (ref 44–72)
NRBC # BLD AUTO: 0 K/UL
NRBC BLD-RTO: 0 /100 WBC
OVALOCYTES BLD QL SMEAR: NORMAL
PLATELET # BLD AUTO: 278 K/UL (ref 164–446)
PLATELET BLD QL SMEAR: NORMAL
PMV BLD AUTO: 10.9 FL (ref 9–12.9)
POIKILOCYTOSIS BLD QL SMEAR: NORMAL
RBC # BLD AUTO: 3.87 M/UL (ref 4.2–5.4)
RBC BLD AUTO: PRESENT
SCHISTOCYTES BLD QL SMEAR: NORMAL
WBC # BLD AUTO: 4.2 K/UL (ref 4.8–10.8)

## 2019-04-19 PROCEDURE — 85025 COMPLETE CBC W/AUTO DIFF WBC: CPT

## 2019-04-19 PROCEDURE — 99214 OFFICE O/P EST MOD 30 MIN: CPT | Performed by: FAMILY MEDICINE

## 2019-04-19 RX ORDER — CEPHALEXIN 250 MG/1
250 CAPSULE ORAL DAILY
COMMUNITY
End: 2019-06-07

## 2019-04-19 NOTE — PROGRESS NOTES
Complaint: f/u HTN     Subjective:     Shashank Christian is a 68 y.o. female here today for f/u.    HTN (hypertension)  Tolerating new med regimen well. Compliant with taking meds accordingly.    Weight loss  No appetite. No energy.    Rectal bleeding  Has been experiencing some bright red blood after BM's. Problem with constipation.    Eye discomfort, left  Saw optometrist, getting new glasses.    History of cancer of vulva  Experiencing new skin changes in vulva area, bleeding to point of needing to wear pads. Has appointment to see Dr. Craig in 2 weeks.    S/P revision of total knee  Still experiencing quite a bit of pain in left knee, needing Percocet prn. On Keflex for next couple years.       Current medicines (including changes today)  Current Outpatient Prescriptions   Medication Sig Dispense Refill   • cephALEXin (KEFLEX) 250 MG Cap Take 250 mg by mouth every day.     • omeprazole (PRILOSEC) 20 MG delayed-release capsule Take 1 Cap by mouth every day. 30 Cap 5   • losartan (COZAAR) 100 MG Tab Take 1 Tab by mouth every day. 90 Tab 1   • sertraline (ZOLOFT) 100 MG Tab Take 1 Tab by mouth every day. 90 Tab 1   • traZODone (DESYREL) 150 MG Tab Take 1 Tab by mouth at bedtime as needed for Sleep. 90 Tab 1   • amLODIPine (NORVASC) 5 MG Tab Take 1 Tab by mouth every day. 30 Tab 0   • oxybutynin (DITROPAN) 5 MG Tab TAKE 2 TABLETS BY MOUTH EVERY NIGHT AT BEDTIME 180 Tab 0   • oxycodone-acetaminophen (PERCOCET-10)  MG Tab Take 1-2 Tabs by mouth every four hours as needed for Severe Pain.       No current facility-administered medications for this visit.      She  has a past medical history of Depression (7/21/2015); GERD (gastroesophageal reflux disease); Hypertension; Insomnia (7/21/2015); Overactive bladder; Urinary incontinence with continuous leakage; Vitamin D deficiency (7/21/2015); and Vulvar cancer (HCC).    Health Maintenance:     Allergies: Patient has no known allergies.    Current Outpatient  Prescriptions Ordered in Epic   Medication Sig Dispense Refill   • cephALEXin (KEFLEX) 250 MG Cap Take 250 mg by mouth every day.     • omeprazole (PRILOSEC) 20 MG delayed-release capsule Take 1 Cap by mouth every day. 30 Cap 5   • losartan (COZAAR) 100 MG Tab Take 1 Tab by mouth every day. 90 Tab 1   • sertraline (ZOLOFT) 100 MG Tab Take 1 Tab by mouth every day. 90 Tab 1   • traZODone (DESYREL) 150 MG Tab Take 1 Tab by mouth at bedtime as needed for Sleep. 90 Tab 1   • amLODIPine (NORVASC) 5 MG Tab Take 1 Tab by mouth every day. 30 Tab 0   • oxybutynin (DITROPAN) 5 MG Tab TAKE 2 TABLETS BY MOUTH EVERY NIGHT AT BEDTIME 180 Tab 0   • oxycodone-acetaminophen (PERCOCET-10)  MG Tab Take 1-2 Tabs by mouth every four hours as needed for Severe Pain.       No current Epic-ordered facility-administered medications on file.        Past Medical History:   Diagnosis Date   • Depression 7/21/2015   • GERD (gastroesophageal reflux disease)    • Hypertension    • Insomnia 7/21/2015   • Overactive bladder    • Urinary incontinence with continuous leakage    • Vitamin D deficiency 7/21/2015   • Vulvar cancer (HCC)     had chemo and radiation and then surgery for recurrence, Dr. SOMMER       Past Surgical History:   Procedure Laterality Date   • IRRIGATION & DEBRIDEMENT ORTHO Left 3/19/2017    Procedure: IRRIGATION & DEBRIDEMENT ORTHO KNEE;  Surgeon: Rigo Irving M.D.;  Location: [facility];  Service:    • KNEE REVISION TOTAL  3/19/2017    Procedure: KNEE REVISION TOTAL FOR POLYETHYLENE LINER EXCHANGE;  Surgeon: Rigo Irving M.D.;  Location: [facility];  Service:    • IRRIGATION & DEBRIDEMENT ORTHO Left 3/16/2017    Procedure: IRRIGATION & DEBRIDEMENT ORTHO-THIGH AND KNEE;  Surgeon: Rigo Irving M.D.;  Location: [facility];  Service:    • HARDWARE REMOVAL ORTHO Left 3/16/2017    Procedure: HARDWARE REMOVAL ORTHO-THIGH;  Surgeon: Rigo Irving M.D.;  Location: SURGERY  "[facility];  Service:    • ABDOMINAL EXPLORATION     • SHOULDER ARTHROPLASTY TOTAL Left        Social History   Substance Use Topics   • Smoking status: Never Smoker   • Smokeless tobacco: Never Used   • Alcohol use No       Social History     Social History Narrative   • No narrative on file       Family History   Problem Relation Age of Onset   • Cancer Mother    • Heart Disease Father          ROS Positive for left knee pain, lack of energy, vulvar and rectal bleeding.  Patient denies any fever, chills, unintentional weight gain/loss, fatigue, stroke symptoms, dizziness, headache, nasal congestion, sore-throat, cough, heartburn, chest pain, difficulty breathing, abdominal discomfort, diarrhea/constipation, burning with urination or frequency, back pain, skin rashes, depression or anxiety.       Objective:     /80 (BP Location: Left arm, Patient Position: Sitting, BP Cuff Size: Adult)   Pulse 94   Temp 37.2 °C (99 °F)   Resp 16   Ht 1.664 m (5' 5.5\")   Wt 82.1 kg (181 lb)   SpO2 94%  Body mass index is 29.66 kg/m².   Physical Exam:  Constitutional: Alert, no distress.  No formal exam today.      Assessment and Plan:   The following treatment plan was discussed    1. Essential hypertension  Controlled on meds.     2. Rectal bleeding  Probably sec. To internal hemorrhoids, but needs further w/u given her hx. Recommend Miralax qod instead of Senna.  - REFERRAL TO COLORECTAL SURGERY  - CBC WITH DIFFERENTIAL; will notify with result.    3. 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    4. History of cancer of vulva  Pt to f/u with Dr. Craig.      Followup: Return to be determined.    Please note that this dictation was created using voice recognition software. I have made every reasonable attempt to correct obvious errors, but I expect that there are errors of grammar and possibly content that I did not discover before finalizing the note.           "

## 2019-04-19 NOTE — ASSESSMENT & PLAN NOTE
Experiencing new skin changes in vulva area, bleeding to point of needing to wear pads. Has appointment to see Dr. Craig in 2 weeks.

## 2019-04-19 NOTE — ASSESSMENT & PLAN NOTE
Still experiencing quite a bit of pain in left knee, needing Percocet prn. On Keflex for next couple years.

## 2019-06-07 ENCOUNTER — OFFICE VISIT (OUTPATIENT)
Dept: MEDICAL GROUP | Facility: CLINIC | Age: 69
End: 2019-06-07
Payer: MEDICARE

## 2019-06-07 VITALS
RESPIRATION RATE: 16 BRPM | WEIGHT: 181 LBS | HEART RATE: 50 BPM | OXYGEN SATURATION: 96 % | DIASTOLIC BLOOD PRESSURE: 90 MMHG | BODY MASS INDEX: 30.9 KG/M2 | TEMPERATURE: 98.7 F | HEIGHT: 64 IN | SYSTOLIC BLOOD PRESSURE: 172 MMHG

## 2019-06-07 DIAGNOSIS — D64.9 ANEMIA, UNSPECIFIED TYPE: ICD-10-CM

## 2019-06-07 DIAGNOSIS — R42 DIZZINESS: ICD-10-CM

## 2019-06-07 DIAGNOSIS — I10 ESSENTIAL HYPERTENSION: ICD-10-CM

## 2019-06-07 DIAGNOSIS — R00.1 BRADYCARDIA: ICD-10-CM

## 2019-06-07 PROBLEM — R26.89 BALANCE PROBLEM: Status: ACTIVE | Noted: 2019-06-07

## 2019-06-07 PROCEDURE — 99214 OFFICE O/P EST MOD 30 MIN: CPT | Performed by: FAMILY MEDICINE

## 2019-06-07 PROCEDURE — 93000 ELECTROCARDIOGRAM COMPLETE: CPT | Performed by: FAMILY MEDICINE

## 2019-06-07 RX ORDER — MELOXICAM 15 MG/1
15 TABLET ORAL
Refills: 2 | COMMUNITY
Start: 2019-05-29 | End: 2019-11-15

## 2019-06-07 RX ORDER — AMLODIPINE BESYLATE 10 MG/1
10 TABLET ORAL DAILY
Qty: 30 TAB | Refills: 5 | Status: SHIPPED | OUTPATIENT
Start: 2019-06-07 | End: 2019-06-12 | Stop reason: SDUPTHER

## 2019-06-07 NOTE — ASSESSMENT & PLAN NOTE
Last Hb was 9.5. Not taking any iron supplement. Has not made appointment to see surgeon for her rectal. bleeding.

## 2019-06-07 NOTE — ASSESSMENT & PLAN NOTE
Reports feeling dizzy when driving, to the point she feels like dozing off. Denies feeling like her head in spinning. No heart palpitations. Does not feel like passing out when getting up. BP running high at other doc appointment. Sometimes forget to take her Norvasc in AM.

## 2019-06-07 NOTE — PROGRESS NOTES
Complaint: Dizziness     Subjective:     Shashank Christian is a 68 y.o. female here today with an ongoing problem.    Dizziness  Reports feeling dizzy when driving, to the point she feels like dozing off. Denies feeling like her head in spinning. Not related to turning her head. No blurred vision, ringing in ears, headache. No heart palpitations. Does not feel like passing out when getting up. BP running high at other doc appointment. Sometimes forget to take her Norvasc in AM.    Anemia  Last Hb was 9.5. Not taking any iron supplement. Has not made appointment to see surgeon for her rectal. bleeding.    HTN (hypertension)  Did not take her Norvasc today.     Ongoing problem with left knee. Needs to use cane for balance, fell once a couple of weeks ago.    Current medicines (including changes today)  Current Outpatient Prescriptions   Medication Sig Dispense Refill   • meloxicam (MOBIC) 15 MG tablet Take 15 mg by mouth every day.  2   • omeprazole (PRILOSEC) 20 MG delayed-release capsule Take 1 Cap by mouth every day. 30 Cap 5   • losartan (COZAAR) 100 MG Tab Take 1 Tab by mouth every day. 90 Tab 1   • sertraline (ZOLOFT) 100 MG Tab Take 1 Tab by mouth every day. 90 Tab 1   • traZODone (DESYREL) 150 MG Tab Take 1 Tab by mouth at bedtime as needed for Sleep. 90 Tab 1   • amLODIPine (NORVASC) 5 MG Tab Take 1 Tab by mouth every day. 30 Tab 0   • oxybutynin (DITROPAN) 5 MG Tab TAKE 2 TABLETS BY MOUTH EVERY NIGHT AT BEDTIME 180 Tab 0   • oxycodone-acetaminophen (PERCOCET-10)  MG Tab Take 1-2 Tabs by mouth every four hours as needed for Severe Pain.       No current facility-administered medications for this visit.      She  has a past medical history of Depression (7/21/2015); GERD (gastroesophageal reflux disease); Hypertension; Insomnia (7/21/2015); Overactive bladder; Urinary incontinence with continuous leakage; Vitamin D deficiency (7/21/2015); and Vulvar cancer (HCC).    [facility]  Maintenance:      Allergies: Patient has no known allergies.    Current Outpatient Prescriptions Ordered in [facility]   Medication Sig Dispense Refill   • meloxicam (MOBIC) 15 MG tablet Take 15 mg by mouth every day.  2   • omeprazole (PRILOSEC) 20 MG delayed-release capsule Take 1 Cap by mouth every day. 30 Cap 5   • losartan (COZAAR) 100 MG Tab Take 1 Tab by mouth every day. 90 Tab 1   • sertraline (ZOLOFT) 100 MG Tab Take 1 Tab by mouth every day. 90 Tab 1   • traZODone (DESYREL) 150 MG Tab Take 1 Tab by mouth at bedtime as needed for Sleep. 90 Tab 1   • amLODIPine (NORVASC) 5 MG Tab Take 1 Tab by mouth every day. 30 Tab 0   • oxybutynin (DITROPAN) 5 MG Tab TAKE 2 TABLETS BY MOUTH EVERY NIGHT AT BEDTIME 180 Tab 0   • oxycodone-acetaminophen (PERCOCET-10)  MG Tab Take 1-2 Tabs by mouth every four hours as needed for Severe Pain.       No current Epic-ordered facility-administered medications on file.        Past Medical History:   Diagnosis Date   • Depression 7/21/2015   • GERD (gastroesophageal reflux disease)    • Hypertension    • Insomnia 7/21/2015   • Overactive bladder    • Urinary incontinence with continuous leakage    • Vitamin D deficiency 7/21/2015   • Vulvar cancer (HCC)     had chemo and radiation and then surgery for recurrence, Dr. SOMMER       Past Surgical History:   Procedure Laterality Date   • IRRIGATION & DEBRIDEMENT ORTHO Left 3/19/2017    Procedure: IRRIGATION & DEBRIDEMENT ORTHO KNEE;  Surgeon: Rigo Irving M.D.;  Location: [facility];  Service:    • KNEE REVISION TOTAL  3/19/2017    Procedure: KNEE REVISION TOTAL FOR POLYETHYLENE LINER EXCHANGE;  Surgeon: Rigo Irving M.D.;  Location: [facility];  Service:    • IRRIGATION & DEBRIDEMENT ORTHO Left 3/16/2017    Procedure: IRRIGATION & DEBRIDEMENT ORTHO-THIGH AND KNEE;  Surgeon: Rigo Irving M.D.;  Location: [facility];  Service:    • HARDWARE REMOVAL ORTHO Left 3/16/2017    Procedure:  "HARDWARE REMOVAL ORTHO-THIGH;  Surgeon: Rigo Irving M.D.;  Location: SURGERY [facility];  Service:    • ABDOMINAL EXPLORATION     • SHOULDER ARTHROPLASTY TOTAL Left        Social History   Substance Use Topics   • Smoking status: Never Smoker   • Smokeless tobacco: Never Used   • Alcohol use No       Social History     Social History Narrative   • No narrative on file       Family History   Problem Relation Age of Onset   • Cancer Mother    • Heart Disease Father          ROS Positive for dizziness, fatigue, somnolence  Patient denies any fever, chills, unintentional weight gain/loss, fatigue, stroke symptoms, dizziness, headache, nasal congestion, sore-throat, cough, heartburn, chest pain, difficulty breathing, abdominal discomfort, diarrhea/constipation, burning with urination or frequency, joint or back pain, skin rashes, depression or anxiety.       Objective:     BP (!) 172/90 (BP Location: Left arm, Patient Position: Sitting, BP Cuff Size: Adult)   Pulse (!) 50   Temp 37.1 °C (98.7 °F) (Temporal)   Resp 16   Ht 1.626 m (5' 4\")   Wt 82.1 kg (181 lb)   SpO2 96%  Body mass index is 31.07 kg/m².   Physical Exam:  Constitutional: Alert, no distress.  Skin: Warm, dry, good turgor, no rashes in visible areas.  Eye: No positional nystagmus.Equal, round and reactive, conjunctiva clear, lids normal. No   ENMT: Lips without lesions, good dentition, oropharynx clear.  Neck: Trachea midline, no masses, no thyromegaly. No cervical or supraclavicular lymphadenopathy  Respiratory: Unlabored respiratory effort, lungs clear to auscultation, no wheezes, no ronchi.  Cardiovascular: Thomas RR, occasional pause, normal S1, S2, no murmur, no extremity edema. Carotids clear.   Neuro  Psych: Alert and oriented x3, appropriate affect and mood.  Neuro: no swaying with eyes closed. Nl coordination. CN 2 -12 grossly intact.    EKG: upon my read  sinus bradycardia, nl axis, nl indices, no sign ischemia    Assessment and " Plan:   The following treatment plan was discussed    1. Dizziness  Question origin, might be multifactorial. Needs to see cards for Holter monitor.    2. Anemia, unspecified type  Needs to see surgeon. Needs to start taking otc iron supplement twice a day.    3. Essential hypertension  Uncontrolled. Needs to take BP meds same time every day. 160/90 at home today. Told to double up on Norvasc to 10 mg, continue monitoring BP.      Followup: Return in about 1 week (around 6/14/2019).    Please note that this dictation was created using voice recognition software. I have made every reasonable attempt to correct obvious errors, but I expect that there are errors of grammar and possibly content that I did not discover before finalizing the note.

## 2019-06-12 DIAGNOSIS — I10 ESSENTIAL HYPERTENSION: ICD-10-CM

## 2019-06-12 RX ORDER — AMLODIPINE BESYLATE 10 MG/1
10 TABLET ORAL DAILY
Qty: 30 TAB | Refills: 5 | Status: SHIPPED | OUTPATIENT
Start: 2019-06-12 | End: 2019-08-07

## 2019-06-12 NOTE — TELEPHONE ENCOUNTER
Was the patient seen in the last year in this department? Yes 6/7/19    Does patient have an active prescription for medications requested? Yes    Received Request Via: Pharmacy     Wants a 90 day prescription.     Labs from 4/19/19 are in media

## 2019-06-19 ENCOUNTER — OFFICE VISIT (OUTPATIENT)
Dept: MEDICAL GROUP | Facility: CLINIC | Age: 69
End: 2019-06-19
Payer: MEDICARE

## 2019-06-19 VITALS
OXYGEN SATURATION: 96 % | HEIGHT: 65 IN | TEMPERATURE: 99.3 F | BODY MASS INDEX: 30.16 KG/M2 | HEART RATE: 68 BPM | RESPIRATION RATE: 14 BRPM | SYSTOLIC BLOOD PRESSURE: 120 MMHG | WEIGHT: 181 LBS | DIASTOLIC BLOOD PRESSURE: 82 MMHG

## 2019-06-19 DIAGNOSIS — I10 ESSENTIAL HYPERTENSION: ICD-10-CM

## 2019-06-19 DIAGNOSIS — D64.9 ANEMIA, UNSPECIFIED TYPE: ICD-10-CM

## 2019-06-19 PROBLEM — R42 DIZZINESS: Status: RESOLVED | Noted: 2019-06-07 | Resolved: 2019-06-19

## 2019-06-19 PROBLEM — R63.4 WEIGHT LOSS: Status: RESOLVED | Noted: 2019-04-19 | Resolved: 2019-06-19

## 2019-06-19 PROBLEM — K62.5 RECTAL BLEEDING: Status: RESOLVED | Noted: 2019-04-19 | Resolved: 2019-06-19

## 2019-06-19 PROBLEM — H57.12 EYE DISCOMFORT, LEFT: Status: RESOLVED | Noted: 2019-03-22 | Resolved: 2019-06-19

## 2019-06-19 PROCEDURE — 99213 OFFICE O/P EST LOW 20 MIN: CPT | Performed by: FAMILY MEDICINE

## 2019-06-19 NOTE — PROGRESS NOTES
Complaint: f/u BP     Subjective:     Shashank Christian is a 68 y.o. female here today for f/u.    No more dizzy spells.    Anemia  Has not set up appointment to see surgeon yet. Procrastinating. States she is overwhelmed with care of her .    HTN (hypertension)  BP now under control with Norvasc 10 mg and Cozaar 100 mg daily.     No other concerns or complaints today.    Current medicines (including changes today)  Current Outpatient Prescriptions   Medication Sig Dispense Refill   • amLODIPine (NORVASC) 10 MG Tab Take 1 Tab by mouth every day. 30 Tab 5   • meloxicam (MOBIC) 15 MG tablet Take 15 mg by mouth every day.  2   • omeprazole (PRILOSEC) 20 MG delayed-release capsule Take 1 Cap by mouth every day. 30 Cap 5   • losartan (COZAAR) 100 MG Tab Take 1 Tab by mouth every day. 90 Tab 1   • sertraline (ZOLOFT) 100 MG Tab Take 1 Tab by mouth every day. 90 Tab 1   • traZODone (DESYREL) 150 MG Tab Take 1 Tab by mouth at bedtime as needed for Sleep. 90 Tab 1   • oxybutynin (DITROPAN) 5 MG Tab TAKE 2 TABLETS BY MOUTH EVERY NIGHT AT BEDTIME 180 Tab 0   • oxycodone-acetaminophen (PERCOCET-10)  MG Tab Take 1-2 Tabs by mouth every four hours as needed for Severe Pain.       No current facility-administered medications for this visit.      She  has a past medical history of Depression (7/21/2015); GERD (gastroesophageal reflux disease); Hypertension; Insomnia (7/21/2015); Overactive bladder; Urinary incontinence with continuous leakage; Vitamin D deficiency (7/21/2015); and Vulvar cancer ([facility]).    Health Maintenance:       Allergies: Patient has no known allergies.    Current Outpatient Prescriptions Ordered in [facility]   Medication Sig Dispense Refill   • amLODIPine (NORVASC) 10 MG Tab Take 1 Tab by mouth every day. 30 Tab 5   • meloxicam (MOBIC) 15 MG tablet Take 15 mg by mouth every day.  2   • omeprazole (PRILOSEC) 20 MG delayed-release capsule Take 1 Cap by mouth every day. 30 Cap 5   • losartan (COZAAR)  100 MG Tab Take 1 Tab by mouth every day. 90 Tab 1   • sertraline (ZOLOFT) 100 MG Tab Take 1 Tab by mouth every day. 90 Tab 1   • traZODone (DESYREL) 150 MG Tab Take 1 Tab by mouth at bedtime as needed for Sleep. 90 Tab 1   • oxybutynin (DITROPAN) 5 MG Tab TAKE 2 TABLETS BY MOUTH EVERY NIGHT AT BEDTIME 180 Tab 0   • oxycodone-acetaminophen (PERCOCET-10)  MG Tab Take 1-2 Tabs by mouth every four hours as needed for Severe Pain.       No current Epic-ordered facility-administered medications on file.        Past Medical History:   Diagnosis Date   • Depression 7/21/2015   • GERD (gastroesophageal reflux disease)    • Hypertension    • Insomnia 7/21/2015   • Overactive bladder    • Urinary incontinence with continuous leakage    • Vitamin D deficiency 7/21/2015   • Vulvar cancer (HCC)     had chemo and radiation and then surgery for recurrence, Dr. SOMMER       Past Surgical History:   Procedure Laterality Date   • IRRIGATION & DEBRIDEMENT ORTHO Left 3/19/2017    Procedure: IRRIGATION & DEBRIDEMENT ORTHO KNEE;  Surgeon: Rigo Irving M.D.;  Location: [facility];  Service:    • KNEE REVISION TOTAL  3/19/2017    Procedure: KNEE REVISION TOTAL FOR POLYETHYLENE LINER EXCHANGE;  Surgeon: Rigo Irving M.D.;  Location: [facility];  Service:    • IRRIGATION & DEBRIDEMENT ORTHO Left 3/16/2017    Procedure: IRRIGATION & DEBRIDEMENT ORTHO-THIGH AND KNEE;  Surgeon: Rigo Irving M.D.;  Location: [facility];  Service:    • HARDWARE REMOVAL ORTHO Left 3/16/2017    Procedure: HARDWARE REMOVAL ORTHO-THIGH;  Surgeon: Rigo Irving M.D.;  Location: SURGERY [facility];  Service:    • ABDOMINAL EXPLORATION     • SHOULDER ARTHROPLASTY TOTAL Left        Social History   Substance Use Topics   • Smoking status: Never Smoker   • Smokeless tobacco: Never Used   • Alcohol use No       Social History     Social History Narrative   • No narrative on file       Family History  "  Problem Relation Age of Onset   • Cancer Mother    • Heart Disease Father          ROS   Patient denies any fever, chills, unintentional weight gain/loss, fatigue, stroke symptoms, dizziness, headache, nasal congestion, sore-throat, cough, heartburn, chest pain, difficulty breathing, abdominal discomfort, diarrhea/constipation, burning with urination or frequency, joint or back pain, skin rashes, depression or anxiety.       Objective:     /82 (BP Location: Left arm, Patient Position: Sitting, BP Cuff Size: Adult)   Pulse 68   Temp 37.4 °C (99.3 °F) (Temporal)   Resp 14   Ht 1.638 m (5' 4.5\")   Wt 82.1 kg (181 lb)   SpO2 96%  Body mass index is 30.59 kg/m².   Physical Exam:  Constitutional: Alert, no distress.  No formal exam  Psych: Alert and oriented x3, appropriate affect and mood.        Assessment and Plan:   The following treatment plan was discussed    1. Essential hypertension  Controlled on meds.    2. Anemia, unspecified type  Stressed again importance to get blood loss evaluated asap.      Followup: Return if symptoms worsen or fail to improve.    Please note that this dictation was created using voice recognition software. I have made every reasonable attempt to correct obvious errors, but I expect that there are errors of grammar and possibly content that I did not discover before finalizing the note.           "

## 2019-06-19 NOTE — ASSESSMENT & PLAN NOTE
Has not set up appointment to see surgeon yet. Procrastinating. States she is overwhelmed with care of her .

## 2019-07-10 ENCOUNTER — OFFICE VISIT (OUTPATIENT)
Dept: MEDICAL GROUP | Facility: CLINIC | Age: 69
End: 2019-07-10
Payer: MEDICARE

## 2019-07-10 VITALS
OXYGEN SATURATION: 96 % | DIASTOLIC BLOOD PRESSURE: 78 MMHG | BODY MASS INDEX: 28.99 KG/M2 | WEIGHT: 174 LBS | HEIGHT: 65 IN | SYSTOLIC BLOOD PRESSURE: 118 MMHG | TEMPERATURE: 98.6 F | RESPIRATION RATE: 14 BRPM | HEART RATE: 104 BPM

## 2019-07-10 DIAGNOSIS — N30.00 ACUTE CYSTITIS WITHOUT HEMATURIA: ICD-10-CM

## 2019-07-10 PROBLEM — Z09 ENCOUNTER FOR EXAMINATION FOLLOWING TREATMENT AT HOSPITAL: Status: ACTIVE | Noted: 2019-07-10

## 2019-07-10 PROCEDURE — 99213 OFFICE O/P EST LOW 20 MIN: CPT | Performed by: FAMILY MEDICINE

## 2019-07-10 RX ORDER — CIPROFLOXACIN 250 MG/1
250 TABLET, FILM COATED ORAL 2 TIMES DAILY
Qty: 20 TAB | Refills: 0 | Status: SHIPPED | OUTPATIENT
Start: 2019-07-10 | End: 2019-08-07

## 2019-07-10 RX ORDER — NITROFURANTOIN 25; 75 MG/1; MG/1
100 CAPSULE ORAL 2 TIMES DAILY
Qty: 14 CAP | Refills: 0 | Status: SHIPPED | OUTPATIENT
Start: 2019-07-10 | End: 2019-07-10

## 2019-07-10 NOTE — ASSESSMENT & PLAN NOTE
Seen CT ER on 7/2 for nausea, vomiting, abdominal pain. CT showed signs of GI'its. Still not feeling well. UA was full on WBC and RBC, cx done, but not treated. No appetite, weight loss.

## 2019-07-10 NOTE — PROGRESS NOTES
Complaint: ER f/u.     Subjective:     Shashank Christian is a 68 y.o. female here today for     Encounter for examination following treatment at hospital  Seen [facility] ER on 7/2 for nausea, vomiting, abdominal pain. CT showed signs of GI'its. Still not feeling well. UA was full on WBC and RBC, cx done, but not treated. No appetite, weight loss.    Acute cystitis  UA grew out pseudomonas.     No other concerns or complaints today.    Current medicines (including changes today)  Current Outpatient Prescriptions   Medication Sig Dispense Refill   • ciprofloxacin (CIPRO) 250 MG Tab Take 1 Tab by mouth 2 times a day. 20 Tab 0   • amLODIPine (NORVASC) 10 MG Tab Take 1 Tab by mouth every day. 30 Tab 5   • meloxicam (MOBIC) 15 MG tablet Take 15 mg by mouth every day.  2   • omeprazole (PRILOSEC) 20 MG delayed-release capsule Take 1 Cap by mouth every day. 30 Cap 5   • losartan (COZAAR) 100 MG Tab Take 1 Tab by mouth every day. 90 Tab 1   • sertraline (ZOLOFT) 100 MG Tab Take 1 Tab by mouth every day. 90 Tab 1   • traZODone (DESYREL) 150 MG Tab Take 1 Tab by mouth at bedtime as needed for Sleep. 90 Tab 1   • oxybutynin (DITROPAN) 5 MG Tab TAKE 2 TABLETS BY MOUTH EVERY NIGHT AT BEDTIME 180 Tab 0   • oxycodone-acetaminophen (PERCOCET-10)  MG Tab Take 1-2 Tabs by mouth every four hours as needed for Severe Pain.       No current facility-administered medications for this visit.      She  has a past medical history of Depression (7/21/2015); GERD (gastroesophageal reflux disease); Hypertension; Insomnia (7/21/2015); Overactive bladder; Urinary incontinence with continuous leakage; Vitamin D deficiency (7/21/2015); and Vulvar cancer (HCC).    Health Maintenance:       Allergies: Patient has no known allergies.    Current Outpatient Prescriptions Ordered in [facility]   Medication Sig Dispense Refill   • ciprofloxacin (CIPRO) 250 MG Tab Take 1 Tab by mouth 2 times a day. 20 Tab 0   • amLODIPine (NORVASC) 10 MG Tab Take 1 Tab by  mouth every day. 30 Tab 5   • meloxicam (MOBIC) 15 MG tablet Take 15 mg by mouth every day.  2   • omeprazole (PRILOSEC) 20 MG delayed-release capsule Take 1 Cap by mouth every day. 30 Cap 5   • losartan (COZAAR) 100 MG Tab Take 1 Tab by mouth every day. 90 Tab 1   • sertraline (ZOLOFT) 100 MG Tab Take 1 Tab by mouth every day. 90 Tab 1   • traZODone (DESYREL) 150 MG Tab Take 1 Tab by mouth at bedtime as needed for Sleep. 90 Tab 1   • oxybutynin (DITROPAN) 5 MG Tab TAKE 2 TABLETS BY MOUTH EVERY NIGHT AT BEDTIME 180 Tab 0   • oxycodone-acetaminophen (PERCOCET-10)  MG Tab Take 1-2 Tabs by mouth every four hours as needed for Severe Pain.       No current Epic-ordered facility-administered medications on file.        Past Medical History:   Diagnosis Date   • Depression 7/21/2015   • GERD (gastroesophageal reflux disease)    • Hypertension    • Insomnia 7/21/2015   • Overactive bladder    • Urinary incontinence with continuous leakage    • Vitamin D deficiency 7/21/2015   • Vulvar cancer (HCC)     had chemo and radiation and then surgery for recurrence, Dr. SOMMER       Past Surgical History:   Procedure Laterality Date   • IRRIGATION & DEBRIDEMENT ORTHO Left 3/19/2017    Procedure: IRRIGATION & DEBRIDEMENT ORTHO KNEE;  Surgeon: Rigo Irving M.D.;  Location: [facility];  Service:    • KNEE REVISION TOTAL  3/19/2017    Procedure: KNEE REVISION TOTAL FOR POLYETHYLENE LINER EXCHANGE;  Surgeon: Rigo Irving M.D.;  Location: [facility];  Service:    • IRRIGATION & DEBRIDEMENT ORTHO Left 3/16/2017    Procedure: IRRIGATION & DEBRIDEMENT ORTHO-THIGH AND KNEE;  Surgeon: Rigo Irving M.D.;  Location: SURGERY [facility];  Service:    • HARDWARE REMOVAL ORTHO Left 3/16/2017    Procedure: HARDWARE REMOVAL ORTHO-THIGH;  Surgeon: Rigo Irving M.D.;  Location: [facility];  Service:    • ABDOMINAL EXPLORATION     • SHOULDER ARTHROPLASTY TOTAL Left        Social History  "  Substance Use Topics   • Smoking status: Never Smoker   • Smokeless tobacco: Never Used   • Alcohol use No       Social History     Social History Narrative   • No narrative on file       Family History   Problem Relation Age of Onset   • Cancer Mother    • Heart Disease Father          ROS Positive for not feeling well, abdominal discomfort, frequent urination.  Patient denies any fever, chills, unintentional weight gain/loss, fatigue, stroke symptoms, dizziness, headache, nasal congestion, sore-throat, cough, heartburn, chest pain, difficulty breathing, abdominal discomfort, diarrhea/constipation, burning with urination, joint or back pain, skin rashes, depression or anxiety.       Objective:     /78   Pulse (!) 104   Temp 37 °C (98.6 °F) (Temporal)   Resp 14   Ht 1.638 m (5' 4.5\")   Wt 78.9 kg (174 lb)   SpO2 96%  Body mass index is 29.41 kg/m².   Physical Exam:  Constitutional: Alert, no distress.  No formal exam        Assessment and Plan:   The following treatment plan was discussed    1. Acute cystitis without hematuria  Will treat according to antibiogram.  - ciprofloxacin (CIPRO) 250 MG Tab; Take 1 Tab by mouth 2 times a day.  Dispense: 20 Tab; Refill: 0      Followup: Return if symptoms worsen or fail to improve.    Please note that this dictation was created using voice recognition software. I have made every reasonable attempt to correct obvious errors, but I expect that there are errors of grammar and possibly content that I did not discover before finalizing the note.           "

## 2019-07-23 ENCOUNTER — TELEPHONE (OUTPATIENT)
Dept: MEDICAL GROUP | Facility: CLINIC | Age: 69
End: 2019-07-23

## 2019-07-23 NOTE — TELEPHONE ENCOUNTER
VM Received 7/19/19 @ 2:51pm    Pt states she wanted to call the provider to inform him she only took her antibiotics for 5 days because she was unable to tolerate them.    Please advise.

## 2019-07-26 ENCOUNTER — TELEPHONE (OUTPATIENT)
Dept: MEDICAL GROUP | Facility: CLINIC | Age: 69
End: 2019-07-26

## 2019-07-26 NOTE — TELEPHONE ENCOUNTER
VOICEMAIL  1. Caller Name: Shashank Christian                      Call Back Number: [phone] (home)     2. Message: Pt Called and LVM and states that she is weak and still not feeling good. She want to know what she should do?    3. Patient approves office to leave a detailed voicemail/MyChart message: yes

## 2019-07-27 ENCOUNTER — TELEPHONE (OUTPATIENT)
Dept: MEDICAL GROUP | Facility: CLINIC | Age: 69
End: 2019-07-27

## 2019-07-27 NOTE — TELEPHONE ENCOUNTER
[facility]  Jamshid Denise 59 [address] [facility], [address] [place] [address]    Consent for Operation    Date: __________________    Time: _______________    1. I authorize the performance upon Anette Dumont the following operation:    Lumbar Puncture    2.  Justin Romero Spoke to patient yesterday. Dizzy. Told to hold her Norvasc, drink plenty of fluids. If not feeling better, go to ER. Pt understood.   videotape. The \[facility]\"" will not be responsible for storage or maintenance of this tape. 6. For the purpose of advancing medical education, I consent to the admittance of observers to the Operating Room.     7. I authorize the use of any specimen, organs Signature of Patient:   ___________________________    When the patient is a minor or mentally incompetent to give consent:  Signature of person authorized to consent for patient: ___________________________   Relationship to patient: _____________________ drugs/illegal medications). Failure to inform my anesthesiologist about these medicines may increase my risk of anesthetic complications. · If I am allergic to anything or have had a reaction to anesthesia before.     3. I understand how the anesthesia med I have discussed the procedure and information above with the patient (or patient’s representative) and answered their questions. The patient or their representative has agreed to have anesthesia services.     _______________________________________________

## 2019-08-07 ENCOUNTER — OFFICE VISIT (OUTPATIENT)
Dept: MEDICAL GROUP | Facility: CLINIC | Age: 69
End: 2019-08-07
Payer: MEDICARE

## 2019-08-07 ENCOUNTER — HOSPITAL ENCOUNTER (OUTPATIENT)
Facility: MEDICAL CENTER | Age: 69
End: 2019-08-07
Attending: FAMILY MEDICINE
Payer: MEDICARE

## 2019-08-07 VITALS
WEIGHT: 175 LBS | HEART RATE: 64 BPM | HEIGHT: 65 IN | OXYGEN SATURATION: 97 % | DIASTOLIC BLOOD PRESSURE: 70 MMHG | BODY MASS INDEX: 29.16 KG/M2 | SYSTOLIC BLOOD PRESSURE: 110 MMHG | TEMPERATURE: 98.4 F

## 2019-08-07 DIAGNOSIS — R53.1 WEAKNESS: ICD-10-CM

## 2019-08-07 DIAGNOSIS — Z86.39 HISTORY OF DIABETES MELLITUS: ICD-10-CM

## 2019-08-07 DIAGNOSIS — N30.01 ACUTE CYSTITIS WITH HEMATURIA: ICD-10-CM

## 2019-08-07 DIAGNOSIS — M25.562 CHRONIC PAIN OF LEFT KNEE: ICD-10-CM

## 2019-08-07 DIAGNOSIS — G89.29 CHRONIC PAIN OF LEFT KNEE: ICD-10-CM

## 2019-08-07 PROBLEM — R10.84 GENERALIZED ABDOMINAL PAIN: Status: ACTIVE | Noted: 2019-08-07

## 2019-08-07 PROBLEM — N30.00 ACUTE CYSTITIS: Status: RESOLVED | Noted: 2019-07-10 | Resolved: 2019-08-07

## 2019-08-07 PROBLEM — D64.9 ANEMIA: Status: RESOLVED | Noted: 2019-06-07 | Resolved: 2019-08-07

## 2019-08-07 PROBLEM — Z09 ENCOUNTER FOR EXAMINATION FOLLOWING TREATMENT AT HOSPITAL: Status: RESOLVED | Noted: 2019-07-10 | Resolved: 2019-08-07

## 2019-08-07 LAB
APPEARANCE UR: NORMAL
BILIRUB UR STRIP-MCNC: NEGATIVE MG/DL
COLOR UR AUTO: NORMAL
GLUCOSE UR STRIP.AUTO-MCNC: NEGATIVE MG/DL
HBA1C MFR BLD: 5.6 % (ref 0–5.6)
INT CON NEG: NORMAL
INT CON POS: NORMAL
KETONES UR STRIP.AUTO-MCNC: NEGATIVE MG/DL
LEUKOCYTE ESTERASE UR QL STRIP.AUTO: NORMAL
NITRITE UR QL STRIP.AUTO: NEGATIVE
PH UR STRIP.AUTO: 5.5 [PH] (ref 5–8)
PROT UR QL STRIP: NORMAL MG/DL
RBC UR QL AUTO: NORMAL
SP GR UR STRIP.AUTO: 1.03
UROBILINOGEN UR STRIP-MCNC: 0.2 MG/DL

## 2019-08-07 PROCEDURE — 99214 OFFICE O/P EST MOD 30 MIN: CPT | Performed by: FAMILY MEDICINE

## 2019-08-07 PROCEDURE — 83036 HEMOGLOBIN GLYCOSYLATED A1C: CPT | Performed by: FAMILY MEDICINE

## 2019-08-07 PROCEDURE — 81002 URINALYSIS NONAUTO W/O SCOPE: CPT | Performed by: FAMILY MEDICINE

## 2019-08-07 PROCEDURE — 87086 URINE CULTURE/COLONY COUNT: CPT

## 2019-08-07 RX ORDER — NITROFURANTOIN 25; 75 MG/1; MG/1
100 CAPSULE ORAL 2 TIMES DAILY
Qty: 20 CAP | Refills: 0 | Status: SHIPPED | OUTPATIENT
Start: 2019-08-07 | End: 2019-11-15

## 2019-08-07 RX ORDER — LIDOCAINE 50 MG/G
1 PATCH TOPICAL EVERY 24 HOURS
Qty: 30 PATCH | Refills: 0 | Status: SHIPPED | OUTPATIENT
Start: 2019-08-07 | End: 2019-11-15

## 2019-08-07 RX ORDER — CEPHALEXIN 500 MG/1
500 CAPSULE ORAL 2 TIMES DAILY
COMMUNITY
End: 2019-11-15

## 2019-08-07 RX ORDER — ONDANSETRON 4 MG/1
TABLET, ORALLY DISINTEGRATING ORAL
COMMUNITY
Start: 2019-07-03 | End: 2019-11-15

## 2019-08-08 NOTE — PROGRESS NOTES
Complaint: Not feeling well.     Subjective:     Shashank Christian is a 68 y.o. female here today for with a couple issues.    Generalized abdominal pain  Complains of a couple day history of abdominal discomfort. Omeprazole not helping. Frequent urination, no burning, some blood in urine.    History of diabetes mellitus  Would like to have her blood sugar level checked, concerned about recurrence of DM. Concerned due to frequent urination.    Chronic pain of left knee  Still experiencing lots of pain in left knee, despite taking Percocet. Sees her ortho tomorrow.    Weakness  Feeling weak, no energy. Had her stop her amlodipine. Still dizzy when getting up from sitting.       Decided to restart Keflex (for osteomyelitis left knee) only when urinary sxs started.    No other concerns or complaints today.    Current medicines (including changes today)  Current Outpatient Medications   Medication Sig Dispense Refill   • cephALEXin (KEFLEX) 500 MG Cap Take 500 mg by mouth 2 times a day.     • nitrofurantoin monohyd macro (MACROBID) 100 MG Cap Take 1 Cap by mouth 2 times a day. 20 Cap 0   • lidocaine (LIDODERM) 5 % Patch Apply 1 Patch to skin as directed every 24 hours. 30 Patch 0   • omeprazole (PRILOSEC) 20 MG delayed-release capsule Take 1 Cap by mouth every day. 30 Cap 5   • losartan (COZAAR) 100 MG Tab Take 1 Tab by mouth every day. 90 Tab 1   • sertraline (ZOLOFT) 100 MG Tab Take 1 Tab by mouth every day. 90 Tab 1   • traZODone (DESYREL) 150 MG Tab Take 1 Tab by mouth at bedtime as needed for Sleep. 90 Tab 1   • oxybutynin (DITROPAN) 5 MG Tab TAKE 2 TABLETS BY MOUTH EVERY NIGHT AT BEDTIME 180 Tab 0   • oxycodone-acetaminophen (PERCOCET-10)  MG Tab Take 1-2 Tabs by mouth every four hours as needed for Severe Pain.     • ondansetron (ZOFRAN ODT) 4 MG TABLET DISPERSIBLE      • meloxicam (MOBIC) 15 MG tablet Take 15 mg by mouth every day.  2     No current facility-administered medications for this visit.       She  has a past medical history of Depression (7/21/2015), GERD (gastroesophageal reflux disease), Hypertension, Insomnia (7/21/2015), Overactive bladder, Urinary incontinence with continuous leakage, Vitamin D deficiency (7/21/2015), Vulvar cancer ([facility]), and Vulvar cancer ([facility]).    Health Maintenance:       Allergies: Ciprofloxacin    Current Outpatient Medications Ordered in Epic   Medication Sig Dispense Refill   • cephALEXin (KEFLEX) 500 MG Cap Take 500 mg by mouth 2 times a day.     • nitrofurantoin monohyd macro (MACROBID) 100 MG Cap Take 1 Cap by mouth 2 times a day. 20 Cap 0   • lidocaine (LIDODERM) 5 % Patch Apply 1 Patch to skin as directed every 24 hours. 30 Patch 0   • omeprazole (PRILOSEC) 20 MG delayed-release capsule Take 1 Cap by mouth every day. 30 Cap 5   • losartan (COZAAR) 100 MG Tab Take 1 Tab by mouth every day. 90 Tab 1   • sertraline (ZOLOFT) 100 MG Tab Take 1 Tab by mouth every day. 90 Tab 1   • traZODone (DESYREL) 150 MG Tab Take 1 Tab by mouth at bedtime as needed for Sleep. 90 Tab 1   • oxybutynin (DITROPAN) 5 MG Tab TAKE 2 TABLETS BY MOUTH EVERY NIGHT AT BEDTIME 180 Tab 0   • oxycodone-acetaminophen (PERCOCET-10)  MG Tab Take 1-2 Tabs by mouth every four hours as needed for Severe Pain.     • ondansetron (ZOFRAN ODT) 4 MG TABLET DISPERSIBLE      • meloxicam (MOBIC) 15 MG tablet Take 15 mg by mouth every day.  2     No current [facility]-ordered facility-administered medications on file.        Past Medical History:   Diagnosis Date   • Depression 7/21/2015   • GERD (gastroesophageal reflux disease)    • Hypertension    • Insomnia 7/21/2015   • Overactive bladder    • Urinary incontinence with continuous leakage    • Vitamin D deficiency 7/21/2015   • Vulvar cancer (HCC)     had chemo and radiation and then surgery for recurrence, Dr. SOMMER   • Vulvar cancer (HCC)        Past Surgical History:   Procedure Laterality Date   • IRRIGATION & DEBRIDEMENT ORTHO Left 3/19/2017    Procedure:  "IRRIGATION & DEBRIDEMENT ORTHO KNEE;  Surgeon: Rigo Irving M.D.;  Location: SURGERY [facility];  Service:    • KNEE REVISION TOTAL  3/19/2017    Procedure: KNEE REVISION TOTAL FOR POLYETHYLENE LINER EXCHANGE;  Surgeon: Rigo Irving M.D.;  Location: SURGERY [facility];  Service:    • IRRIGATION & DEBRIDEMENT ORTHO Left 3/16/2017    Procedure: IRRIGATION & DEBRIDEMENT ORTHO-THIGH AND KNEE;  Surgeon: Rigo Irving M.D.;  Location: SURGERY [facility];  Service:    • HARDWARE REMOVAL ORTHO Left 3/16/2017    Procedure: HARDWARE REMOVAL ORTHO-THIGH;  Surgeon: Rigo Irving M.D.;  Location: SURGERY [facility];  Service:    • ABDOMINAL EXPLORATION     • SHOULDER ARTHROPLASTY TOTAL Left        Social History     Tobacco Use   • Smoking status: Never Smoker   • Smokeless tobacco: Never Used   Substance Use Topics   • Alcohol use: No   • Drug use: No       Social History     Social History Narrative   • Not on file       Family History   Problem Relation Age of Onset   • Cancer Mother    • Heart Disease Father          ROS Positive for frequent urination, blood in urine, generalized weakness, left knee pain, dizziness.  Patient denies any fever, chills, unintentional weight gain/loss, fatigue, stroke symptoms, headache, nasal congestion, sore-throat, cough, heartburn, chest pain, difficulty breathing, abdominal discomfort, diarrhea/constipation,  skin rashes, depression or anxiety.       Objective:     /70 (BP Location: Left arm, Patient Position: Sitting, BP Cuff Size: Adult)   Pulse 64   Temp 36.9 °C (98.4 °F) (Temporal)   Ht 1.651 m (5' 5\")   Wt 79.4 kg (175 lb)   SpO2 97%  Body mass index is 29.12 kg/m².   Physical Exam:  Constitutional: Alert, no distress.  Respiratory: Unlabored respiratory effort, lungs clear to auscultation, no wheezes, no ronchi.  Cardiovascular: Normal S1, S2, no murmur, no extremity edema.  Abdomen: Soft, tender epigastric area, no masses, no " hepatosplenomegaly.  Left knee: OA changes, tender below tibal plateau medially.  Psych: Alert and oriented x3, appropriate affect and mood.        Assessment and Plan:   The following treatment plan was discussed    1. Acute cystitis with hematuria  - POCT Urinalysis - trace blood, trace Lc's  - URINE CULTURE(NEW); Future  - nitrofurantoin monohyd macro (MACROBID) 100 MG Cap; Take 1 Cap by mouth 2 times a day.  Dispense: 20 Cap; Refill: 0    2. History of diabetes mellitus  A1c 5.6% . Patient informed.  - POCT Hemoglobin A1C    3. Chronic pain of left knee  Recommend topical pain relief, f/u with ortho as scheduled.  - lidocaine (LIDODERM) 5 % Patch; Apply 1 Patch to skin as directed every 24 hours.  Dispense: 30 Patch; Refill: 0    4. Weakness  Take only 1/2 Cozaar 100 mg daily.    5. Abdominal pain  Will refer to GI if sxs persist.      Followup: Return if symptoms worsen or fail to improve.    Please note that this dictation was created using voice recognition software. I have made every reasonable attempt to correct obvious errors, but I expect that there are errors of grammar and possibly content that I did not discover before finalizing the note.

## 2019-08-08 NOTE — ASSESSMENT & PLAN NOTE
Would like to have her blood sugar level checked, concerned about recurrence of DM. Concerned due to frequent urination.

## 2019-08-08 NOTE — ASSESSMENT & PLAN NOTE
Complains of a couple day history of abdominal discomfort. Omeprazole not helping. Frequent urination, no burning, some blood in urine.

## 2019-08-09 ENCOUNTER — TELEPHONE (OUTPATIENT)
Dept: MEDICAL GROUP | Facility: PHYSICIAN GROUP | Age: 69
End: 2019-08-09

## 2019-08-09 NOTE — TELEPHONE ENCOUNTER
MEDICATION PRIOR AUTHORIZATION NEEDED:    1. Name of Medication: LIDOCAINE 5% PATCH    2. Requested By (Name of Pharmacy): [facility] WAS STARTED YESTERDAY 8-8-19 VIA [facility]     3. Is insurance on file current? YES    4. What is the name & phone number of the 3rd party payor? NA

## 2019-08-09 NOTE — TELEPHONE ENCOUNTER
FINAL PRIOR AUTHORIZATION STATUS:    1.  Name of Medication & Dose: LIDOCAINE     2. Prior Auth Status: Denied.  Reason: DENIED UNDER MEDICARE PART D NOT MEDICALLY ACCEPTED CONDITION PER MEDICARE    3. Action Taken: Pharmacy Notified: no Patient Notified: no

## 2019-08-10 LAB
BACTERIA UR CULT: NORMAL
SIGNIFICANT IND 70042: NORMAL
SITE SITE: NORMAL
SOURCE SOURCE: NORMAL

## 2019-08-10 NOTE — TELEPHONE ENCOUNTER
Reached patient by phone.  Seen by her orthopaedic told not much too do, ligament problem nothing they could do about that.

## 2019-08-12 ENCOUNTER — TELEPHONE (OUTPATIENT)
Dept: MEDICAL GROUP | Facility: CLINIC | Age: 69
End: 2019-08-12

## 2019-08-12 NOTE — TELEPHONE ENCOUNTER
Patient had A1C done on 8/7/19. 5.6    Controls for machine were not completed correctly when they were documented to be ran.     Results may differ do to this error. Please let me know what you would like us to do for patient.

## 2019-10-22 DIAGNOSIS — N39.45 URINARY INCONTINENCE WITH CONTINUOUS LEAKAGE: ICD-10-CM

## 2019-10-22 RX ORDER — OXYBUTYNIN CHLORIDE 5 MG/1
TABLET ORAL
Qty: 180 TAB | Refills: 0 | Status: SHIPPED | OUTPATIENT
Start: 2019-10-22 | End: 2019-11-15 | Stop reason: SDUPTHER

## 2019-10-22 NOTE — TELEPHONE ENCOUNTER
Was the patient seen in the last year in this department? Yes    Does patient have an active prescription for medications requested? Yes    Received Request Via: Pharmacy    Hospital Outpatient Visit on 08/07/2019   Component Date Value   • Significant Indicator 08/07/2019 NEG    • Source 08/07/2019 UR    • Site 08/07/2019 URINE, CLEAN CATCH    • Culture Result 08/07/2019 Mixed skin pat <10,000 cfu/mL    Office Visit on 08/07/2019   Component Date Value   • POC Color 08/07/2019 dark yellow    • POC Appearance 08/07/2019 slightly cloudy    • POC Leukocyte Esterase 08/07/2019 trace    • POC Nitrites 08/07/2019 negative    • POC Urobiligen 08/07/2019 0.2    • POC Protein 08/07/2019 100mg    • POC Urine PH 08/07/2019 5.5    • POC Blood 08/07/2019 trace - lysed    • POC Specific Gravity 08/07/2019 1.030    • POC Ketones 08/07/2019 negative    • POC Bilirubin 08/07/2019 negative    • POC Glucose 08/07/2019 negative    • Glycohemoglobin 08/07/2019 5.6    • Internal Control Negative 08/07/2019 Valid    • Internal Control Positive 08/07/2019 Valid    Hospital Outpatient Visit on 04/19/2019   Component Date Value   • WBC 04/19/2019 4.2*   • RBC 04/19/2019 3.87*   • Hemoglobin 04/19/2019 9.5*   • Hematocrit 04/19/2019 32.1*   • MCV 04/19/2019 82.9    • MCH 04/19/2019 24.5*   • MCHC 04/19/2019 29.6*   • RDW 04/19/2019 46.8    • Platelet Count 04/19/2019 278    • MPV 04/19/2019 10.9    • Neutrophils-Polys 04/19/2019 72.20*   • Lymphocytes 04/19/2019 16.20*   • Monocytes 04/19/2019 5.90    • Eosinophils 04/19/2019 4.50    • Basophils 04/19/2019 1.00    • Immature Granulocytes 04/19/2019 0.20    • Nucleated RBC 04/19/2019 0.00    • Neutrophils (Absolute) 04/19/2019 3.04    • Lymphs (Absolute) 04/19/2019 0.68*   • Monos (Absolute) 04/19/2019 0.25    • Eos (Absolute) 04/19/2019 0.19    • Baso (Absolute) 04/19/2019 0.04    • Immature Granulocytes (a* 04/19/2019 0.01    • NRBC (Absolute) 04/19/2019 0.00    • Peripheral Smear Review  04/19/2019 see below    • Plt Estimation 04/19/2019 Normal    • RBC Morphology 04/19/2019 Present    • Poikilocytosis 04/19/2019 1+    • Ovalocytes 04/19/2019 1+    • Schistocytes 04/19/2019 1+    • Comments-Diff 04/19/2019 see below    ]

## 2019-10-24 RX ORDER — AMLODIPINE BESYLATE 10 MG/1
TABLET ORAL
COMMUNITY
Start: 2019-10-24 | End: 2019-10-24 | Stop reason: SDUPTHER

## 2019-10-24 RX ORDER — AMLODIPINE BESYLATE 10 MG/1
10 TABLET ORAL DAILY
Qty: 90 TAB | Refills: 0 | Status: SHIPPED | OUTPATIENT
Start: 2019-10-24 | End: 2019-11-15

## 2019-11-15 ENCOUNTER — OFFICE VISIT (OUTPATIENT)
Dept: MEDICAL GROUP | Facility: CLINIC | Age: 69
End: 2019-11-15
Payer: MEDICARE

## 2019-11-15 VITALS
SYSTOLIC BLOOD PRESSURE: 120 MMHG | WEIGHT: 179.2 LBS | BODY MASS INDEX: 29.85 KG/M2 | DIASTOLIC BLOOD PRESSURE: 70 MMHG | TEMPERATURE: 99.5 F | HEART RATE: 58 BPM | OXYGEN SATURATION: 93 % | HEIGHT: 65 IN

## 2019-11-15 DIAGNOSIS — R26.89 BALANCE PROBLEM: ICD-10-CM

## 2019-11-15 DIAGNOSIS — K21.9 GASTROESOPHAGEAL REFLUX DISEASE WITHOUT ESOPHAGITIS: ICD-10-CM

## 2019-11-15 DIAGNOSIS — G47.01 INSOMNIA DUE TO MEDICAL CONDITION: ICD-10-CM

## 2019-11-15 DIAGNOSIS — N32.81 OVERACTIVE BLADDER: ICD-10-CM

## 2019-11-15 DIAGNOSIS — Z23 NEED FOR VACCINATION: ICD-10-CM

## 2019-11-15 DIAGNOSIS — K21.00 GASTROESOPHAGEAL REFLUX DISEASE WITH ESOPHAGITIS: ICD-10-CM

## 2019-11-15 DIAGNOSIS — F32.A DEPRESSION, UNSPECIFIED DEPRESSION TYPE: ICD-10-CM

## 2019-11-15 DIAGNOSIS — I10 ESSENTIAL HYPERTENSION: ICD-10-CM

## 2019-11-15 DIAGNOSIS — M76.32 ILIOTIBIAL BAND SYNDROME OF LEFT SIDE: ICD-10-CM

## 2019-11-15 DIAGNOSIS — N39.45 URINARY INCONTINENCE WITH CONTINUOUS LEAKAGE: ICD-10-CM

## 2019-11-15 DIAGNOSIS — R73.9 ELEVATED BLOOD SUGAR: ICD-10-CM

## 2019-11-15 PROBLEM — R10.84 GENERALIZED ABDOMINAL PAIN: Status: RESOLVED | Noted: 2019-08-07 | Resolved: 2019-11-15

## 2019-11-15 PROBLEM — M00.062 STAPHYLOCOCCAL ARTHRITIS OF LEFT KNEE (HCC): Status: RESOLVED | Noted: 2019-03-22 | Resolved: 2019-11-15

## 2019-11-15 PROBLEM — K59.03 DRUG-INDUCED CONSTIPATION: Status: ACTIVE | Noted: 2019-11-15

## 2019-11-15 LAB
HBA1C MFR BLD: 5.6 % (ref 0–5.6)
INT CON NEG: NORMAL
INT CON POS: NORMAL

## 2019-11-15 PROCEDURE — 83036 HEMOGLOBIN GLYCOSYLATED A1C: CPT | Performed by: FAMILY MEDICINE

## 2019-11-15 PROCEDURE — 99214 OFFICE O/P EST MOD 30 MIN: CPT | Mod: 25 | Performed by: FAMILY MEDICINE

## 2019-11-15 PROCEDURE — 90732 PPSV23 VACC 2 YRS+ SUBQ/IM: CPT | Performed by: FAMILY MEDICINE

## 2019-11-15 PROCEDURE — G0008 ADMIN INFLUENZA VIRUS VAC: HCPCS | Performed by: FAMILY MEDICINE

## 2019-11-15 PROCEDURE — G0009 ADMIN PNEUMOCOCCAL VACCINE: HCPCS | Performed by: FAMILY MEDICINE

## 2019-11-15 PROCEDURE — 90662 IIV NO PRSV INCREASED AG IM: CPT | Performed by: FAMILY MEDICINE

## 2019-11-15 RX ORDER — SERTRALINE HYDROCHLORIDE 100 MG/1
100 TABLET, FILM COATED ORAL
Qty: 90 TAB | Refills: 3 | Status: SHIPPED | OUTPATIENT
Start: 2019-11-15 | End: 2020-09-03 | Stop reason: SDUPTHER

## 2019-11-15 RX ORDER — MIRTAZAPINE 15 MG/1
15 TABLET, FILM COATED ORAL
Qty: 30 TAB | Refills: 2 | Status: SHIPPED | OUTPATIENT
Start: 2019-11-15 | End: 2019-12-23

## 2019-11-15 RX ORDER — TRAZODONE HYDROCHLORIDE 150 MG/1
TABLET ORAL
Qty: 90 TAB | Refills: 3 | Status: SHIPPED | OUTPATIENT
Start: 2019-11-15 | End: 2019-11-15

## 2019-11-15 RX ORDER — LOSARTAN POTASSIUM 100 MG/1
100 TABLET ORAL
Qty: 90 TAB | Refills: 3 | Status: SHIPPED | OUTPATIENT
Start: 2019-11-15 | End: 2020-05-29

## 2019-11-15 RX ORDER — OMEPRAZOLE 20 MG/1
20 CAPSULE, DELAYED RELEASE ORAL
Qty: 90 CAP | Refills: 3 | Status: SHIPPED | OUTPATIENT
Start: 2019-11-15 | End: 2020-10-28 | Stop reason: SDUPTHER

## 2019-11-15 RX ORDER — OXYBUTYNIN CHLORIDE 5 MG/1
TABLET ORAL
Qty: 180 TAB | Refills: 3 | Status: SHIPPED | OUTPATIENT
Start: 2019-11-15 | End: 2021-01-04 | Stop reason: SDUPTHER

## 2019-11-15 NOTE — PROGRESS NOTES
Complaint: F/u BP, knee pain, med refills     Subjective:     Shashank Christian is a 69 y.o. female here today for a number of issues.    Iliotibial band syndrome of left side  Was told by Dr. Nuñez taught knee in outside of left knee was caused by tendon inflammation. Not coming from joint. Keeping her from walking much. He is rx'ing topical for pain relief and sending her to PT.    Insomnia due to medical condition  Can't sleep if not taking trazodone. However, when she goes off, no longer constipated. Never tried Remeron.    Elevated blood sugar  Last A1c was not controlled, needs repeat.    Drug-induced constipation  Probably caused by trazodone, as per patient.    Depression  Doing ok on Zoloft, needs refill.    HTN (hypertension)  Cut on Norvasc, now only taking Cozaar. Needs refill.    Overactive bladder  Controlled on Ditropan.    Gastroesophageal reflux disease without esophagitis  Concerned about long term omeprazole use, but gets bad reflux symptoms if she goes off med.    Balance problem  Uses cane, denies any falls or almost falls.     No other concerns or complaints today.    Current medicines (including changes today)  Current Outpatient Medications   Medication Sig Dispense Refill   • sertraline (ZOLOFT) 100 MG Tab Take 1 Tab by mouth every day. 90 Tab 3   • losartan (COZAAR) 100 MG Tab Take 1 Tab by mouth every day. 90 Tab 3   • omeprazole (PRILOSEC) 20 MG delayed-release capsule Take 1 Cap by mouth every day. 90 Cap 3   • oxybutynin (DITROPAN) 5 MG Tab TAKE 2 TABLETS BY MOUTH EVERY NIGHT AT BEDTIME 180 Tab 3   • mirtazapine (REMERON) 15 MG Tab Take 1 Tab by mouth every bedtime. 30 Tab 2   • oxycodone-acetaminophen (PERCOCET-10)  MG Tab Take 1-2 Tabs by mouth every four hours as needed for Severe Pain.       No current facility-administered medications for this visit.      She  has a past medical history of Depression (7/21/2015), GERD (gastroesophageal reflux disease), Hypertension,  Insomnia (7/21/2015), Overactive bladder, Urinary incontinence with continuous leakage, Vitamin D deficiency (7/21/2015), Vulvar cancer (HCC), and Vulvar cancer (HCC).    Health Maintenance: needs immunizations      Allergies: Ciprofloxacin    Current Outpatient Medications Ordered in Epic   Medication Sig Dispense Refill   • sertraline (ZOLOFT) 100 MG Tab Take 1 Tab by mouth every day. 90 Tab 3   • losartan (COZAAR) 100 MG Tab Take 1 Tab by mouth every day. 90 Tab 3   • omeprazole (PRILOSEC) 20 MG delayed-release capsule Take 1 Cap by mouth every day. 90 Cap 3   • oxybutynin (DITROPAN) 5 MG Tab TAKE 2 TABLETS BY MOUTH EVERY NIGHT AT BEDTIME 180 Tab 3   • mirtazapine (REMERON) 15 MG Tab Take 1 Tab by mouth every bedtime. 30 Tab 2   • oxycodone-acetaminophen (PERCOCET-10)  MG Tab Take 1-2 Tabs by mouth every four hours as needed for Severe Pain.       No current Epic-ordered facility-administered medications on file.        Past Medical History:   Diagnosis Date   • Depression 7/21/2015   • GERD (gastroesophageal reflux disease)    • Hypertension    • Insomnia 7/21/2015   • Overactive bladder    • Urinary incontinence with continuous leakage    • Vitamin D deficiency 7/21/2015   • Vulvar cancer (HCC)     had chemo and radiation and then surgery for recurrence, Dr. SOMMER   • Vulvar cancer (HCC)        Past Surgical History:   Procedure Laterality Date   • IRRIGATION & DEBRIDEMENT ORTHO Left 3/19/2017    Procedure: IRRIGATION & DEBRIDEMENT ORTHO KNEE;  Surgeon: Rigo Irving M.D.;  Location: SURGERY [facility];  Service:    • KNEE REVISION TOTAL  3/19/2017    Procedure: KNEE REVISION TOTAL FOR POLYETHYLENE LINER EXCHANGE;  Surgeon: Rigo Irving M.D.;  Location: SURGERY [facility];  Service:    • IRRIGATION & DEBRIDEMENT ORTHO Left 3/16/2017    Procedure: IRRIGATION & DEBRIDEMENT ORTHO-THIGH AND KNEE;  Surgeon: Rigo Irving M.D.;  Location: [facility];  Service:    • HARDWARE  "REMOVAL ORTHO Left 3/16/2017    Procedure: HARDWARE REMOVAL ORTHO-THIGH;  Surgeon: Rigo Irving M.D.;  Location: SURGERY [facility];  Service:    • ABDOMINAL EXPLORATION     • SHOULDER ARTHROPLASTY TOTAL Left        Social History     Tobacco Use   • Smoking status: Never Smoker   • Smokeless tobacco: Never Used   Substance Use Topics   • Alcohol use: No   • Drug use: No       Social History     Patient does not qualify to have social determinant information on file (likely too young).   Social History Narrative   • Not on file       Family History   Problem Relation Age of Onset   • Cancer Mother    • Heart Disease Father          ROS Positive for pain left thigh above knee  Patient denies any fever, chills, unintentional weight gain/loss, fatigue, stroke symptoms, dizziness, headache, nasal congestion, sore-throat, cough, heartburn, chest pain, difficulty breathing, abdominal discomfort, diarrhea/constipation, burning with urination or frequency, back pain, skin rashes, depression or anxiety.       Objective:     /70 (BP Location: Left arm, Patient Position: Sitting)   Pulse (!) 58   Temp 37.5 °C (99.5 °F) (Temporal)   Ht 1.638 m (5' 4.5\")   Wt 81.3 kg (179 lb 3.2 oz)   SpO2 93%  Body mass index is 30.28 kg/m².   Physical Exam:  Constitutional: Alert, no distress.  No formal exam      Assessment and Plan:   The following treatment plan was discussed    1. Elevated blood sugar  A1c 5.4% today. Patient reassured no DM.  - POCT Hemoglobin A1C    2. Depression, unspecified depression type  Stable on meds.  - sertraline (ZOLOFT) 100 MG Tab; Take 1 Tab by mouth every day.  Dispense: 90 Tab; Refill: 3    3. Essential hypertension  Controlled on med.  - losartan (COZAAR) 100 MG Tab; Take 1 Tab by mouth every day.  Dispense: 90 Tab; Refill: 3    4. Gastroesophageal reflux disease with esophagitis  Controlled on med.  - omeprazole (PRILOSEC) 20 MG delayed-release capsule; Take 1 Cap by mouth every day.  " Dispense: 90 Cap; Refill: 3    5. Insomnia due to medical condition  Trial on Remeron, patient to report back. D/c trazodone for time being.  -- mirtazapine (REMERON) 15 MG Tab; Take 1 Tab by mouth every bedtime.  Dispense: 30 Tab; Refill: 2    6. Overactive bladder  Controlled on meds.  - oxybutynin (DITROPAN) 5 MG Tab; TAKE 2 TABLETS BY MOUTH EVERY NIGHT AT BEDTIME  Dispense: 180 Tab; Refill: 3    7. Iliotibial band syndrome of left side  Recommend massage area with ball, ice/heat.    8. Need for vaccination  - INFLUENZA VACCINE, HIGH DOSE (65+ ONLY)  - Pneumococal Polysaccharide Vaccine 23-Valent =>1YO SQ/IM      9. Gastroesophageal reflux disease without esophagitis  Controlled on meds.    10. Balance problem  Continue use of cane.      Followup: Return in about 3 months (around 2/15/2020), or if symptoms worsen or fail to improve.    Please note that this dictation was created using voice recognition software. I have made every reasonable attempt to correct obvious errors, but I expect that there are errors of grammar and possibly content that I did not discover before finalizing the note.

## 2019-11-15 NOTE — ASSESSMENT & PLAN NOTE
Can't sleep if not taking trazodone. However, when she goes off, no longer constipated. Never tried Remeron.

## 2019-11-15 NOTE — ASSESSMENT & PLAN NOTE
Was told by Dr. Nuñez taught knee in outside of left knee was caused by tendon inflammation. Not coming from joint. Keeping her from walking much. He is rx'ing topical for pain relief and sending her to PT.

## 2020-01-17 NOTE — PROGRESS NOTES
Complaint: Refill meds     Subjective:     Shashank Christian is a 67 y.o. female here today for follow-up. Has not been seen here in past year.     HTN (hypertension)  Currently on Cozaar 50mg qd. Denies any headaches, dizziness, palpitations, chest pressure.    Unsteady gait  Poor eyesight, left leg a bit wobbly at times, has stumbled without falling. Has cane, but does not use it.    Osteoarthritis of left shoulder  Scheduled to have left shoulder replaced by Dr. Perez next month.    Insomnia due to medical condition  Not sleeping well, has been out of trazodone for a couple months.    Depression  Feeling more depressed, due to health issues. Would like to increase her Zoloft dose.     Had recent PET scan, unsure whether it would  breast lesion.    Has temporary prosthesis in left knee due to past infection.    Current medicines (including changes today)  Current Outpatient Prescriptions   Medication Sig Dispense Refill   • traZODone (DESYREL) 150 MG Tab Take 1 Tab by mouth at bedtime as needed for Sleep. 90 Tab 1   • sertraline (ZOLOFT) 100 MG Tab Take 1.5 Tabs by mouth every day. 235 Tab 1   • omeprazole (PRILOSEC) 20 MG delayed-release capsule Take 1 Cap by mouth every day. 90 Cap 0   • oxybutynin (DITROPAN) 5 MG Tab TAKE 2 TABLETS BY MOUTH EVERY NIGHT AT BEDTIME 180 Tab 0   • losartan (COZAAR) 50 MG Tab Take 1 Tab by mouth every day. 90 Tab 1   • oxycodone-acetaminophen (PERCOCET-10)  MG Tab Take 1-2 Tabs by mouth every four hours as needed for Severe Pain.       No current facility-administered medications for this visit.      She  has a past medical history of Depression (7/21/2015); GERD (gastroesophageal reflux disease); Hypertension; Insomnia (7/21/2015); Urinary incontinence with continuous leakage; Vitamin D deficiency (7/21/2015); and Vulvar cancer (HCC).    Health Maintenance: needs breast cancer screening.      Allergies: Patient has no known allergies.    Current Outpatient  Shon Nitza is a 66 y.o. female here today for follow up of pancytopenia. States generalized arthritis pain today, 2/10. Prescriptions Ordered in Epic   Medication Sig Dispense Refill   • traZODone (DESYREL) 150 MG Tab Take 1 Tab by mouth at bedtime as needed for Sleep. 90 Tab 1   • sertraline (ZOLOFT) 100 MG Tab Take 1.5 Tabs by mouth every day. 235 Tab 1   • omeprazole (PRILOSEC) 20 MG delayed-release capsule Take 1 Cap by mouth every day. 90 Cap 0   • oxybutynin (DITROPAN) 5 MG Tab TAKE 2 TABLETS BY MOUTH EVERY NIGHT AT BEDTIME 180 Tab 0   • losartan (COZAAR) 50 MG Tab Take 1 Tab by mouth every day. 90 Tab 1   • oxycodone-acetaminophen (PERCOCET-10)  MG Tab Take 1-2 Tabs by mouth every four hours as needed for Severe Pain.       No current Epic-ordered facility-administered medications on file.        Past Medical History:   Diagnosis Date   • Depression 7/21/2015   • GERD (gastroesophageal reflux disease)    • Hypertension    • Insomnia 7/21/2015   • Urinary incontinence with continuous leakage    • Vitamin D deficiency 7/21/2015   • Vulvar cancer (HCC)     had chemo and radiation and then surgery for recurrence, Dr. SOMMER       Past Surgical History:   Procedure Laterality Date   • IRRIGATION & DEBRIDEMENT ORTHO Left 3/19/2017    Procedure: IRRIGATION & DEBRIDEMENT ORTHO KNEE;  Surgeon: Rigo Irving M.D.;  Location: [facility];  Service:    • KNEE REVISION TOTAL  3/19/2017    Procedure: KNEE REVISION TOTAL FOR POLYETHYLENE LINER EXCHANGE;  Surgeon: Rigo Irving M.D.;  Location: SURGERY [facility];  Service:    • IRRIGATION & DEBRIDEMENT ORTHO Left 3/16/2017    Procedure: IRRIGATION & DEBRIDEMENT ORTHO-THIGH AND KNEE;  Surgeon: Rigo Irving M.D.;  Location: SURGERY [facility];  Service:    • HARDWARE REMOVAL ORTHO Left 3/16/2017    Procedure: HARDWARE REMOVAL ORTHO-THIGH;  Surgeon: Rigo Irving M.D.;  Location: SURGERY [facility];  Service:    • ABDOMINAL EXPLORATION         Social History   Substance Use Topics   • Smoking status: Never Smoker   • Smokeless tobacco: Never Used  "  • Alcohol use No       Social History     Social History Narrative   • No narrative on file       Family History   Problem Relation Age of Onset   • Cancer Mother    • Heart Disease Father          ROS Positive for pain and decreased mobility in left shoulder, depressive feelings and trouble with sleep.  Patient denies any fever, chills, unintentional weight gain/loss, fatigue, stroke symptoms, dizziness, headache, nasal congestion, sore-throat, cough, heartburn, chest pain, difficulty breathing, abdominal discomfort, diarrhea/constipation, burning with urination or frequency, skin rashes, depression.       Objective:     Blood pressure 132/76, pulse 96, temperature 37.3 °C (99.1 °F), resp. rate 18, height 1.626 m (5' 4\"), weight 84.8 kg (187 lb), last menstrual period 10/01/1990, SpO2 95 %. Body mass index is 32.1 kg/m².   Physical Exam:  Constitutional: Alert, no distress.  Skin: Warm, dry, good turgor, no rashes in visible areas.  Eye: Equal, round and reactive, conjunctiva clear, lids normal.  ENMT: Lips without lesions, upper denture, oropharynx clear.  Neck: Trachea midline, no masses, no thyromegaly. No cervical or supraclavicular lymphadenopathy  Respiratory: Unlabored respiratory effort, lungs clear to auscultation, no wheezes, no ronchi.  Cardiovascular: Normal S1, S2, no murmur, no extremity edema. Varicose veins.  Psych: Alert and oriented x3, appropriate affect and mood.        Assessment and Plan:   The following treatment plan was discussed    1. Essential hypertension  Chronic problem, controlled on meds.    2. Depression, unspecified depression type  Chronic problem, uncontrolled. Will increase her Zoloft. Reassess at pre-op visit.  - sertraline (ZOLOFT) 100 MG Tab; Take 1.5 Tabs by mouth every day.  Dispense: 235 Tab; Refill: 1    3. Insomnia due to medical condition  Chronic problem. Will restart her trazodone. Reasses at pre-op.  - traZODone (DESYREL) 150 MG Tab; Take 1 Tab by mouth at bedtime " as needed for Sleep.  Dispense: 90 Tab; Refill: 1    4. Unsteady gait  Chronic problem. Recommend getting her vision checked and use cane.  - Patient identified as fall risk.  Appropriate orders and counseling given.    5. Breast cancer screening  Pt given Rx.  - MA-SCREEN MAMMO W/CAD-BILAT; Future      Followup: Return in about 2 weeks (around 8/2/2018), or pre-op.    Please note that this dictation was created using voice recognition software. I have made every reasonable attempt to correct obvious errors, but I expect that there are errors of grammar and possibly content that I did not discover before finalizing the note.

## 2020-02-24 RX ORDER — AMLODIPINE BESYLATE 10 MG/1
10 TABLET ORAL DAILY
Qty: 90 TAB | Refills: 0 | OUTPATIENT
Start: 2020-02-24

## 2020-04-22 ENCOUNTER — HOSPITAL ENCOUNTER (OUTPATIENT)
Facility: MEDICAL CENTER | Age: 70
End: 2020-04-22
Attending: PHYSICIAN ASSISTANT
Payer: MEDICARE

## 2020-04-22 ENCOUNTER — OFFICE VISIT (OUTPATIENT)
Dept: URGENT CARE | Facility: CLINIC | Age: 70
End: 2020-04-22
Payer: MEDICARE

## 2020-04-22 VITALS
HEIGHT: 65 IN | OXYGEN SATURATION: 94 % | DIASTOLIC BLOOD PRESSURE: 84 MMHG | HEART RATE: 63 BPM | TEMPERATURE: 99.4 F | WEIGHT: 194 LBS | SYSTOLIC BLOOD PRESSURE: 126 MMHG | BODY MASS INDEX: 32.32 KG/M2 | RESPIRATION RATE: 14 BRPM

## 2020-04-22 DIAGNOSIS — R30.0 DYSURIA: ICD-10-CM

## 2020-04-22 LAB
APPEARANCE UR: CLEAR
BILIRUB UR STRIP-MCNC: NEGATIVE MG/DL
COLOR UR AUTO: YELLOW
GLUCOSE UR STRIP.AUTO-MCNC: NEGATIVE MG/DL
KETONES UR STRIP.AUTO-MCNC: NEGATIVE MG/DL
LEUKOCYTE ESTERASE UR QL STRIP.AUTO: NEGATIVE
NITRITE UR QL STRIP.AUTO: NEGATIVE
PH UR STRIP.AUTO: 5.5 [PH] (ref 5–8)
PROT UR QL STRIP: NORMAL MG/DL
RBC UR QL AUTO: NEGATIVE
SP GR UR STRIP.AUTO: 1.02
UROBILINOGEN UR STRIP-MCNC: NEGATIVE MG/DL

## 2020-04-22 PROCEDURE — 81002 URINALYSIS NONAUTO W/O SCOPE: CPT | Performed by: PHYSICIAN ASSISTANT

## 2020-04-22 PROCEDURE — 87086 URINE CULTURE/COLONY COUNT: CPT

## 2020-04-22 PROCEDURE — 99214 OFFICE O/P EST MOD 30 MIN: CPT | Performed by: PHYSICIAN ASSISTANT

## 2020-04-22 RX ORDER — CEFDINIR 300 MG/1
300 CAPSULE ORAL EVERY 12 HOURS
Qty: 10 CAP | Refills: 0 | Status: SHIPPED | OUTPATIENT
Start: 2020-04-22 | End: 2020-04-27

## 2020-04-22 RX ORDER — TRAZODONE HYDROCHLORIDE 100 MG/1
TABLET ORAL
COMMUNITY
End: 2020-09-03 | Stop reason: SDUPTHER

## 2020-04-22 ASSESSMENT — ENCOUNTER SYMPTOMS
HEADACHES: 0
FEVER: 0
COUGH: 0
SWEATS: 1
FLANK PAIN: 0
PALPITATIONS: 0
BACK PAIN: 0
ABDOMINAL PAIN: 0
SHORTNESS OF BREATH: 0
CHILLS: 0
NAUSEA: 0
VOMITING: 0

## 2020-04-22 NOTE — PROGRESS NOTES
Subjective:      Shashank Christian is a 69 y.o. female who presents with Urinary Frequency            Dysuria    This is a new problem. Episode onset: several days  The problem occurs intermittently. The problem has been unchanged. The quality of the pain is described as burning. The pain is mild. There has been no fever. There is no history of pyelonephritis. Associated symptoms include frequency, sweats and urgency. Pertinent negatives include no chills, discharge, flank pain, hematuria, hesitancy, nausea or vomiting. Treatments tried: Keflex once  The treatment provided no relief. Her past medical history is significant for recurrent UTIs. urinary leakage     Past Medical History:   Diagnosis Date   • Depression 7/21/2015   • GERD (gastroesophageal reflux disease)    • Hypertension    • Insomnia 7/21/2015   • Overactive bladder    • Urinary incontinence with continuous leakage    • Vitamin D deficiency 7/21/2015   • Vulvar cancer (HCC)     had chemo and radiation and then surgery for recurrence, Dr. SOMMER   • Vulvar cancer (HCC)        Past Surgical History:   Procedure Laterality Date   • IRRIGATION & DEBRIDEMENT ORTHO Left 3/19/2017    Procedure: IRRIGATION & DEBRIDEMENT ORTHO KNEE;  Surgeon: Rigo Irving M.D.;  Location: [facility];  Service:    • KNEE REVISION TOTAL  3/19/2017    Procedure: KNEE REVISION TOTAL FOR POLYETHYLENE LINER EXCHANGE;  Surgeon: Rigo Irving M.D.;  Location: [facility];  Service:    • IRRIGATION & DEBRIDEMENT ORTHO Left 3/16/2017    Procedure: IRRIGATION & DEBRIDEMENT ORTHO-THIGH AND KNEE;  Surgeon: Rigo Irving M.D.;  Location: [facility];  Service:    • HARDWARE REMOVAL ORTHO Left 3/16/2017    Procedure: HARDWARE REMOVAL ORTHO-THIGH;  Surgeon: Rigo Irving M.D.;  Location: [facility];  Service:    • ABDOMINAL EXPLORATION     • SHOULDER ARTHROPLASTY TOTAL Left        Family History   Problem Relation Age of  "Onset   • Cancer Mother    • Heart Disease Father        Allergies   Allergen Reactions   • Doxycycline        Medications, Allergies, and current problem list reviewed today in Epic    Review of Systems   Constitutional: Negative for chills, fever and malaise/fatigue.   Respiratory: Negative for cough and shortness of breath.    Cardiovascular: Negative for chest pain, palpitations and leg swelling.   Gastrointestinal: Negative for abdominal pain, nausea and vomiting.   Genitourinary: Positive for dysuria, frequency and urgency. Negative for flank pain, hematuria and hesitancy.   Musculoskeletal: Negative for back pain.   Skin: Negative for rash.   Neurological: Negative for headaches.       All other systems reviewed and are negative.      Objective:     /84 (BP Location: Right arm, Patient Position: Sitting)   Pulse 63   Temp 37.4 °C (99.4 °F)   Resp 14   Ht 1.638 m (5' 4.5\")   Wt 88 kg (194 lb)   LMP 10/01/1990   SpO2 94%   BMI 32.79 kg/m²      Physical Exam  Constitutional:       General: She is not in acute distress.  HENT:      Head: Normocephalic and atraumatic.   Eyes:      Conjunctiva/sclera: Conjunctivae normal.   Neck:      Musculoskeletal: Normal range of motion.   Cardiovascular:      Rate and Rhythm: Normal rate and regular rhythm.      Heart sounds: Normal heart sounds. No murmur. No friction rub. No gallop.    Pulmonary:      Effort: Pulmonary effort is normal. No respiratory distress.      Breath sounds: Normal breath sounds. No wheezing, rhonchi or rales.   Abdominal:      General: There is no distension.      Palpations: Abdomen is soft. There is no mass.      Tenderness: There is no abdominal tenderness. There is no right CVA tenderness, left CVA tenderness, guarding or rebound.   Skin:     General: Skin is warm and dry.   Neurological:      General: No focal deficit present.      Mental Status: She is alert and oriented to person, place, and time.   Psychiatric:         Mood and " Affect: Mood normal. Affect is blunt.         Speech: Speech normal.         Behavior: Behavior normal.         Thought Content: Thought content normal.         Judgment: Judgment normal.         UA: straw color. Protein +. No leuks. No nitrates.              Assessment/Plan:       1. Dysuria    Patient is symptomatic with hx of UTIs. She took a Keflex recently.  Will treat empirically. Culture urine and change treatment plan accordingly.  - POCT Urinalysis  - Urine Culture; Future  - cefdinir (OMNICEF) 300 MG Cap; Take 1 Cap by mouth every 12 hours for 5 days.  Dispense: 10 Cap; Refill: 0     Differential diagnoses, Supportive care, and indications for immediate follow-up discussed with patient.   Instructed to return to clinic or nearest emergency department for any change in condition, further concerns, or worsening of symptoms.    The patient demonstrated a good understanding and agreed with the treatment plan.    Olinda Lagunas P.A.-C.

## 2020-04-24 LAB
BACTERIA UR CULT: NORMAL
SIGNIFICANT IND 70042: NORMAL
SITE SITE: NORMAL
SOURCE SOURCE: NORMAL

## 2020-05-29 ENCOUNTER — OFFICE VISIT (OUTPATIENT)
Dept: MEDICAL GROUP | Facility: CLINIC | Age: 70
End: 2020-05-29
Payer: MEDICARE

## 2020-05-29 VITALS
DIASTOLIC BLOOD PRESSURE: 82 MMHG | WEIGHT: 189.2 LBS | OXYGEN SATURATION: 94 % | TEMPERATURE: 99.8 F | SYSTOLIC BLOOD PRESSURE: 102 MMHG | HEIGHT: 65 IN | RESPIRATION RATE: 14 BRPM | BODY MASS INDEX: 31.52 KG/M2 | HEART RATE: 72 BPM

## 2020-05-29 DIAGNOSIS — I70.0 ATHEROSCLEROSIS OF AORTA (HCC): ICD-10-CM

## 2020-05-29 DIAGNOSIS — R68.84 PAIN IN LOWER JAW: ICD-10-CM

## 2020-05-29 PROCEDURE — 99214 OFFICE O/P EST MOD 30 MIN: CPT | Performed by: FAMILY MEDICINE

## 2020-05-29 RX ORDER — AMOXICILLIN 500 MG/1
500 CAPSULE ORAL 3 TIMES DAILY
Qty: 30 CAP | Refills: 0 | Status: SHIPPED | OUTPATIENT
Start: 2020-05-29 | End: 2020-08-05

## 2020-05-29 NOTE — PROGRESS NOTES
Complaint: Abnormal XR     Subjective:     Shashank Christian is a 69 y.o. female here today for f/u and to discuss a couple issues.    Atherosclerosis of aorta (HCC)  Found to have on XR lumbar spine done at [facility]. Denies any leg cramps when walking.    Pain in lower jaw  Does not have lower denture. For past week has been experiencing localized pain in lower jaw, does not know if she bit down too hard on something. Pain radiates up into left ear.     No other concerns or complaints today.    Current medicines (including changes today)  Current Outpatient Medications   Medication Sig Dispense Refill   • amoxicillin (AMOXIL) 500 MG Cap Take 1 Cap by mouth 3 times a day. 30 Cap 0   • traZODone (DESYREL) 100 MG Tab TRAZODONE  MG TABS     • mirtazapine (REMERON) 15 MG Tab TAKE 1 TABLET BY MOUTH EVERYDAY AT BEDTIME 30 Tab 5   • sertraline (ZOLOFT) 100 MG Tab Take 1 Tab by mouth every day. 90 Tab 3   • omeprazole (PRILOSEC) 20 MG delayed-release capsule Take 1 Cap by mouth every day. 90 Cap 3   • oxybutynin (DITROPAN) 5 MG Tab TAKE 2 TABLETS BY MOUTH EVERY NIGHT AT BEDTIME 180 Tab 3   • oxycodone-acetaminophen (PERCOCET-10)  MG Tab Take 1-2 Tabs by mouth every four hours as needed for Severe Pain.       No current facility-administered medications for this visit.      She  has a past medical history of Depression (7/21/2015), GERD (gastroesophageal reflux disease), Hypertension, Insomnia (7/21/2015), Overactive bladder, Urinary incontinence with continuous leakage, Vitamin D deficiency (7/21/2015), Vulvar cancer ([facility]), and Vulvar cancer ([facility]).    Health Maintenance:       Allergies: Doxycycline    Current Outpatient Medications Ordered in Epic   Medication Sig Dispense Refill   • amoxicillin (AMOXIL) 500 MG Cap Take 1 Cap by mouth 3 times a day. 30 Cap 0   • traZODone (DESYREL) 100 MG Tab TRAZODONE  MG TABS     • mirtazapine (REMERON) 15 MG Tab TAKE 1 TABLET BY MOUTH EVERYDAY AT BEDTIME 30 Tab 5   •  sertraline (ZOLOFT) 100 MG Tab Take 1 Tab by mouth every day. 90 Tab 3   • omeprazole (PRILOSEC) 20 MG delayed-release capsule Take 1 Cap by mouth every day. 90 Cap 3   • oxybutynin (DITROPAN) 5 MG Tab TAKE 2 TABLETS BY MOUTH EVERY NIGHT AT BEDTIME 180 Tab 3   • oxycodone-acetaminophen (PERCOCET-10)  MG Tab Take 1-2 Tabs by mouth every four hours as needed for Severe Pain.       No current Epic-ordered facility-administered medications on file.        Past Medical History:   Diagnosis Date   • Depression 7/21/2015   • GERD (gastroesophageal reflux disease)    • Hypertension    • Insomnia 7/21/2015   • Overactive bladder    • Urinary incontinence with continuous leakage    • Vitamin D deficiency 7/21/2015   • Vulvar cancer (HCC)     had chemo and radiation and then surgery for recurrence, Dr. SOMMER   • Vulvar cancer (HCC)        Past Surgical History:   Procedure Laterality Date   • IRRIGATION & DEBRIDEMENT ORTHO Left 3/19/2017    Procedure: IRRIGATION & DEBRIDEMENT ORTHO KNEE;  Surgeon: Rigo Irving M.D.;  Location: SURGERY [facility];  Service:    • KNEE REVISION TOTAL  3/19/2017    Procedure: KNEE REVISION TOTAL FOR POLYETHYLENE LINER EXCHANGE;  Surgeon: Rigo Irving M.D.;  Location: [facility];  Service:    • IRRIGATION & DEBRIDEMENT ORTHO Left 3/16/2017    Procedure: IRRIGATION & DEBRIDEMENT ORTHO-THIGH AND KNEE;  Surgeon: Rigo Irving M.D.;  Location: [facility];  Service:    • HARDWARE REMOVAL ORTHO Left 3/16/2017    Procedure: HARDWARE REMOVAL ORTHO-THIGH;  Surgeon: Rigo Irving M.D.;  Location: [facility];  Service:    • ABDOMINAL EXPLORATION     • SHOULDER ARTHROPLASTY TOTAL Left        Social History     Tobacco Use   • Smoking status: Never Smoker   • Smokeless tobacco: Never Used   Substance Use Topics   • Alcohol use: No   • Drug use: No       Social History     Social History Narrative   • Not on file       Family History   Problem  "Relation Age of Onset   • Cancer Mother    • Heart Disease Father          ROS Positive for pain in left jaw, left ear, lower back and neck.  Patient denies any fever, chills, unintentional weight gain/loss, fatigue, stroke symptoms, dizziness, headache, nasal congestion, sore-throat, cough, heartburn, chest pain, difficulty breathing, abdominal discomfort, diarrhea/constipation, burning with urination or frequency, skin rashes, depression or anxiety.       Objective:     /82 (BP Location: Left arm, Patient Position: Sitting, BP Cuff Size: Adult)   Pulse 72   Temp 37.7 °C (99.8 °F) (Temporal)   Resp 14   Ht 1.638 m (5' 4.5\")   Wt 85.8 kg (189 lb 3.2 oz)   SpO2 94%  Body mass index is 31.97 kg/m².   Physical Exam:  Constitutional: Alert, no distress. Ambulating with cane.   Eye: Equal, round and reactive, conjunctiva clear, lids normal.  ENMT: Lips without lesions, edentulous dentition lower jaw - upper denture, oropharynx clear, very tender spot in left mid-mandible, no mass noted. Skin over it intact. Ear canals clear, TMs pearly.  Neck: Trachea midline, no masses, no thyromegaly. No cervical or supraclavicular lymphadenopathy  Respiratory: Unlabored respiratory effort, lungs clear to auscultation, no wheezes, no ronchi.  Cardiovascular: Normal S1, S2, no murmur, no extremity edema. Feet: no discoloration, warm and good capillary refills, symmetric 2+ pedal pulses noted.  Psych: Alert and oriented x3, appropriate affect and mood.        Assessment and Plan:   The following treatment plan was discussed    1. Atherosclerosis of aorta (HCC)  New problem. Asymptomatic.  - REFERRAL TO VASCULAR SURGERY - [facility]    2. Pain in lower jaw  New problem. Will get XR mandible if no better in 1 week. Order faxed to [facility]. Oral ABs upon patient's wish.   - amoxicillin (AMOXIL) 500 MG Cap; Take 1 Cap by mouth 3 times a day.  Dispense: 30 Cap; Refill: 0      Followup: Return if symptoms worsen or fail to " improve, for with new PCP.    Please note that this dictation was created using voice recognition software. I have made every reasonable attempt to correct obvious errors, but I expect that there are errors of grammar and possibly content that I did not discover before finalizing the note.

## 2020-05-29 NOTE — ASSESSMENT & PLAN NOTE
Found to have on XR lumbar spine done at [facility]. Denies any leg cramps when walking.

## 2020-05-29 NOTE — ASSESSMENT & PLAN NOTE
Does not have lower denture. For past week has been experiencing localized pain in lower jaw, does not know if she bit down too hard on something. Pain radiates up into left ear.

## 2020-08-05 ENCOUNTER — OFFICE VISIT (OUTPATIENT)
Dept: URGENT CARE | Facility: CLINIC | Age: 70
End: 2020-08-05
Payer: MEDICARE

## 2020-08-05 ENCOUNTER — HOSPITAL ENCOUNTER (OUTPATIENT)
Facility: MEDICAL CENTER | Age: 70
End: 2020-08-05
Attending: NURSE PRACTITIONER
Payer: MEDICARE

## 2020-08-05 VITALS
HEART RATE: 63 BPM | OXYGEN SATURATION: 95 % | SYSTOLIC BLOOD PRESSURE: 104 MMHG | BODY MASS INDEX: 30.66 KG/M2 | DIASTOLIC BLOOD PRESSURE: 62 MMHG | HEIGHT: 65 IN | RESPIRATION RATE: 14 BRPM | WEIGHT: 184 LBS | TEMPERATURE: 98.6 F

## 2020-08-05 DIAGNOSIS — N30.00 ACUTE CYSTITIS WITHOUT HEMATURIA: ICD-10-CM

## 2020-08-05 PROBLEM — N18.30 CHRONIC RENAL INSUFFICIENCY, STAGE III (MODERATE): Status: ACTIVE | Noted: 2020-08-05

## 2020-08-05 PROBLEM — Z86.19 PERSONAL HISTORY OF OTHER INFECTIOUS AND PARASITIC DISEASES: Status: ACTIVE | Noted: 2020-08-05

## 2020-08-05 PROBLEM — E83.42 HYPOMAGNESEMIA: Status: ACTIVE | Noted: 2020-08-05

## 2020-08-05 PROBLEM — G89.29 HIP PAIN, CHRONIC: Status: ACTIVE | Noted: 2020-08-05

## 2020-08-05 PROBLEM — I71.40 ABDOMINAL AORTIC ANEURYSM (AAA) (HCC): Status: ACTIVE | Noted: 2020-08-05

## 2020-08-05 PROBLEM — M25.559 HIP PAIN, CHRONIC: Status: ACTIVE | Noted: 2020-08-05

## 2020-08-05 PROBLEM — D50.9 IRON DEFICIENCY ANEMIA: Status: ACTIVE | Noted: 2020-08-05

## 2020-08-05 PROBLEM — M71.20 BAKER'S CYST: Status: ACTIVE | Noted: 2020-08-05

## 2020-08-05 PROBLEM — K64.9 HEMORRHOIDS: Status: ACTIVE | Noted: 2020-08-05

## 2020-08-05 PROBLEM — E78.5 DYSLIPIDEMIA: Status: ACTIVE | Noted: 2020-08-05

## 2020-08-05 LAB
APPEARANCE UR: NORMAL
BILIRUB UR STRIP-MCNC: NEGATIVE MG/DL
COLOR UR AUTO: NORMAL
GLUCOSE UR STRIP.AUTO-MCNC: NEGATIVE MG/DL
KETONES UR STRIP.AUTO-MCNC: NEGATIVE MG/DL
LEUKOCYTE ESTERASE UR QL STRIP.AUTO: NORMAL
NITRITE UR QL STRIP.AUTO: NEGATIVE
PH UR STRIP.AUTO: 5 [PH] (ref 5–8)
PROT UR QL STRIP: NEGATIVE MG/DL
RBC UR QL AUTO: NEGATIVE
SP GR UR STRIP.AUTO: 1.03
UROBILINOGEN UR STRIP-MCNC: NEGATIVE MG/DL

## 2020-08-05 PROCEDURE — 99214 OFFICE O/P EST MOD 30 MIN: CPT | Performed by: NURSE PRACTITIONER

## 2020-08-05 PROCEDURE — 81002 URINALYSIS NONAUTO W/O SCOPE: CPT | Performed by: NURSE PRACTITIONER

## 2020-08-05 PROCEDURE — 87086 URINE CULTURE/COLONY COUNT: CPT

## 2020-08-05 RX ORDER — CEFDINIR 300 MG/1
300 CAPSULE ORAL EVERY 12 HOURS
Qty: 14 CAP | Refills: 0 | Status: SHIPPED | OUTPATIENT
Start: 2020-08-05 | End: 2020-08-12

## 2020-08-05 ASSESSMENT — ENCOUNTER SYMPTOMS
FEVER: 0
NAUSEA: 0
FLANK PAIN: 0
VOMITING: 0
BACK PAIN: 1
ABDOMINAL PAIN: 0

## 2020-08-05 NOTE — PROGRESS NOTES
Subjective:     Shashank Christian is a 69 y.o. female who presents for Urinary Frequency      Frequency. Hx of radiation causing urination issues, OAB. States urine is usually not this dark. No flank pain. Chronic lower back pain. Hx of seeing a urologist. Has to strain to urinate. Feels similar to prior UTI.    Urinary Frequency  This is a new problem. The current episode started 1 to 4 weeks ago. The problem occurs daily. The problem has been waxing and waning. Associated symptoms include urinary symptoms. Pertinent negatives include no abdominal pain, fever, nausea, rash or vomiting. Nothing aggravates the symptoms. She has tried nothing for the symptoms.       Past Medical History:   Diagnosis Date   • Depression 7/21/2015   • GERD (gastroesophageal reflux disease)    • Hypertension    • Insomnia 7/21/2015   • Overactive bladder    • Urinary incontinence with continuous leakage    • Vitamin D deficiency 7/21/2015   • Vulvar cancer (HCC)     had chemo and radiation and then surgery for recurrence, Dr. SOMMER   • Vulvar cancer (HCC)        Past Surgical History:   Procedure Laterality Date   • IRRIGATION & DEBRIDEMENT ORTHO Left 3/19/2017    Procedure: IRRIGATION & DEBRIDEMENT ORTHO KNEE;  Surgeon: Rigo Irving M.D.;  Location: [facility];  Service:    • KNEE REVISION TOTAL  3/19/2017    Procedure: KNEE REVISION TOTAL FOR POLYETHYLENE LINER EXCHANGE;  Surgeon: Rigo Irving M.D.;  Location: [facility];  Service:    • IRRIGATION & DEBRIDEMENT ORTHO Left 3/16/2017    Procedure: IRRIGATION & DEBRIDEMENT ORTHO-THIGH AND KNEE;  Surgeon: Rigo Irving M.D.;  Location: [facility];  Service:    • HARDWARE REMOVAL ORTHO Left 3/16/2017    Procedure: HARDWARE REMOVAL ORTHO-THIGH;  Surgeon: Rigo Irving M.D.;  Location: [facility];  Service:    • ABDOMINAL EXPLORATION     • SHOULDER ARTHROPLASTY TOTAL Left        Social History     Socioeconomic History  "  • Marital status:      Spouse name: Not on file   • Number of children: Not on file   • Years of education: Not on file   • Highest education level: Not on file   Occupational History   • Not on file   Social Needs   • Financial resource strain: Not on file   • Food insecurity     Worry: Not on file     Inability: Not on file   • Transportation needs     Medical: Not on file     Non-medical: Not on file   Tobacco Use   • Smoking status: Never Smoker   • Smokeless tobacco: Never Used   Substance and Sexual Activity   • Alcohol use: No   • Drug use: No   • Sexual activity: Not Currently   Lifestyle   • Physical activity     Days per week: Not on file     Minutes per session: Not on file   • Stress: Not on file   Relationships   • Social connections     Talks on phone: Not on file     Gets together: Not on file     Attends Holiness service: Not on file     Active member of club or organization: Not on file     Attends meetings of clubs or organizations: Not on file     Relationship status: Not on file   • Intimate partner violence     Fear of current or ex partner: Not on file     Emotionally abused: Not on file     Physically abused: Not on file     Forced sexual activity: Not on file   Other Topics Concern   • Not on file   Social History Narrative   • Not on file        Family History   Problem Relation Age of Onset   • Cancer Mother    • Heart Disease Father         Allergies   Allergen Reactions   • Doxycycline        Review of Systems   Constitutional: Negative for fever.   Gastrointestinal: Negative for abdominal pain, nausea and vomiting.   Genitourinary: Positive for frequency. Negative for flank pain and hematuria.   Musculoskeletal: Positive for back pain.   Skin: Negative for rash.   All other systems reviewed and are negative.       Objective:   /62 (BP Location: Left arm, Patient Position: Sitting)   Pulse 63   Temp 37 °C (98.6 °F)   Resp 14   Ht 1.638 m (5' 4.5\")   Wt 83.5 kg (184 " lb)   LMP 10/01/1990   SpO2 95%   BMI 31.10 kg/m²     Physical Exam  Vitals signs reviewed.   Constitutional:       General: She is not in acute distress.     Appearance: She is well-developed.   HENT:      Head: Normocephalic and atraumatic.      Right Ear: External ear normal.      Left Ear: External ear normal.      Nose: Nose normal.   Eyes:      Conjunctiva/sclera: Conjunctivae normal.   Neck:      Musculoskeletal: Normal range of motion.   Cardiovascular:      Rate and Rhythm: Normal rate.   Pulmonary:      Effort: Pulmonary effort is normal.   Abdominal:      General: Abdomen is protuberant. Bowel sounds are normal. There is no distension.      Palpations: Abdomen is soft.      Tenderness: There is abdominal tenderness in the suprapubic area. There is no right CVA tenderness, left CVA tenderness or guarding.      Comments: Slight TTP over lower abdomen.    Musculoskeletal: Normal range of motion.   Skin:     General: Skin is warm and dry.      Findings: No rash.   Neurological:      General: No focal deficit present.      Mental Status: She is alert and oriented to person, place, and time.      GCS: GCS eye subscore is 4. GCS verbal subscore is 5. GCS motor subscore is 6.      Comments: Cane for ambulation.    Psychiatric:         Mood and Affect: Mood normal.         Speech: Speech normal.         Behavior: Behavior normal.         Thought Content: Thought content normal.         Judgment: Judgment normal.         Assessment/Plan:   1. Acute cystitis without hematuria  - POCT Urinalysis  - URINE CULTURE(NEW); Future  - cefdinir (OMNICEF) 300 MG Cap; Take 1 Cap by mouth every 12 hours for 7 days.  Dispense: 14 Cap; Refill: 0  - REFERRAL TO FOLLOW-UP WITH PRIMARY CARE  Trace Leuk.    -Oral Hydration: Drink plenty of water.  -Take antibiotic as prescribed.  -Follow up with PCP.    Follow up urgently for new or persistent abdominal pain, flank pain, difficulty with urination, fevers, vomiting, weakness,  tachycardia, or any other concerns.    Differential diagnosis, natural history, supportive care, and indications for immediate follow-up discussed.

## 2020-08-05 NOTE — PATIENT INSTRUCTIONS
Urinary Tract Infection, Adult  A urinary tract infection (UTI) is an infection of any part of the urinary tract. The urinary tract includes:  · The kidneys.  · The ureters.  · The bladder.  · The urethra.  These organs make, store, and get rid of pee (urine) in the body.  What are the causes?  This is caused by germs (bacteria) in your genital area. These germs grow and cause swelling (inflammation) of your urinary tract.  What increases the risk?  You are more likely to develop this condition if:  · You have a small, thin tube (catheter) to drain pee.  · You cannot control when you pee or poop (incontinence).  · You are female, and:  ? You use these methods to prevent pregnancy:  ? A medicine that kills sperm (spermicide).  ? A device that blocks sperm (diaphragm).  ? You have low levels of a female hormone (estrogen).  ? You are pregnant.  · You have genes that add to your risk.  · You are sexually active.  · You take antibiotic medicines.  · You have trouble peeing because of:  ? A prostate that is bigger than normal, if you are male.  ? A blockage in the part of your body that drains pee from the bladder (urethra).  ? A kidney stone.  ? A nerve condition that affects your bladder (neurogenic bladder).  ? Not getting enough to drink.  ? Not peeing often enough.  · You have other conditions, such as:  ? Diabetes.  ? A weak disease-fighting system (immune system).  ? Sickle cell disease.  ? Gout.  ? Injury of the spine.  What are the signs or symptoms?  Symptoms of this condition include:  · Needing to pee right away (urgently).  · Peeing often.  · Peeing small amounts often.  · Pain or burning when peeing.  · Blood in the pee.  · Pee that smells bad or not like normal.  · Trouble peeing.  · Pee that is cloudy.  · Fluid coming from the vagina, if you are female.  · Pain in the belly or lower back.  Other symptoms include:  · Throwing up (vomiting).  · No urge to eat.  · Feeling mixed up (confused).  · Being tired  and grouchy (irritable).  · A fever.  · Watery poop (diarrhea).  How is this treated?  This condition may be treated with:  · Antibiotic medicine.  · Other medicines.  · Drinking enough water.  Follow these instructions at home:    Medicines  · Take over-the-counter and prescription medicines only as told by your doctor.  · If you were prescribed an antibiotic medicine, take it as told by your doctor. Do not stop taking it even if you start to feel better.  General instructions  · Make sure you:  ? Pee until your bladder is empty.  ? Do not hold pee for a long time.  ? Empty your bladder after sex.  ? Wipe from front to back after pooping if you are a female. Use each tissue one time when you wipe.  · Drink enough fluid to keep your pee pale yellow.  · Keep all follow-up visits as told by your doctor. This is important.  Contact a doctor if:  · You do not get better after 1-2 days.  · Your symptoms go away and then come back.  Get help right away if:  · You have very bad back pain.  · You have very bad pain in your lower belly.  · You have a fever.  · You are sick to your stomach (nauseous).  · You are throwing up.  Summary  · A urinary tract infection (UTI) is an infection of any part of the urinary tract.  · This condition is caused by germs in your genital area.  · There are many risk factors for a UTI. These include having a small, thin tube to drain pee and not being able to control when you pee or poop.  · Treatment includes antibiotic medicines for germs.  · Drink enough fluid to keep your pee pale yellow.  This information is not intended to replace advice given to you by your health care provider. Make sure you discuss any questions you have with your health care provider.  Document Released: 06/05/2009 Document Revised: 12/05/2019 Document Reviewed: 06/27/2019  [employer] Patient Education © 2020 [employer]    Urinary Frequency, Adult  Urinary frequency means urinating more often than usual. You may  urinate every 1-2 hours even though you drink a normal amount of fluid and do not have a bladder infection or condition. Although you urinate more often than normal, the total amount of urine produced in a day is normal.  With urinary frequency, you may have an urgent need to urinate often. The stress and anxiety of needing to find a bathroom quickly can make this urge worse. This condition may go away on its own or you may need treatment at home. Home treatment may include bladder training, exercises, taking medicines, or making changes to your diet.  Follow these instructions at home:  Bladder health    · Keep a bladder diary if told by your health care provider. Keep track of:  ? What you eat and drink.  ? How often you urinate.  ? How much you urinate.  · Follow a bladder training program if told by your health care provider. This may include:  ? Learning to delay going to the bathroom.  ? Double urinating (voiding). This helps if you are not completely emptying your bladder.  ? Scheduled voiding.  · Do Kegel exercises as told by your health care provider. Kegel exercises strengthen the muscles that help control urination, which may help the condition.  Eating and drinking  · If told by your health care provider, make diet changes, such as:  ? Avoiding caffeine.  ? Drinking fewer fluids, especially alcohol.  ? Not drinking in the evening.  ? Avoiding foods or drinks that may irritate the bladder. These include coffee, tea, soda, artificial sweeteners, citrus, tomato-based foods, and chocolate.  ? Eating foods that help prevent or ease constipation. Constipation can make this condition worse. Your health care provider may recommend that you:  § Drink enough fluid to keep your urine pale yellow.  § Take over-the-counter or prescription medicines.  § Eat foods that are high in fiber, such as beans, whole grains, and fresh fruits and vegetables.  § Limit foods that are high in fat and processed sugars, such as fried  or sweet foods.  General instructions  · Take over-the-counter and prescription medicines only as told by your health care provider.  · Keep all follow-up visits as told by your health care provider. This is important.  Contact a health care provider if:  · You start urinating more often.  · You feel pain or irritation when you urinate.  · You notice blood in your urine.  · Your urine looks cloudy.  · You develop a fever.  · You begin vomiting.  Get help right away if:  · You are unable to urinate.  Summary  · Urinary frequency means urinating more often than usual. With urinary frequency, you may urinate every 1-2 hours even though you drink a normal amount of fluid and do not have a bladder infection or other bladder condition.  · Your health care provider may recommend that you keep a bladder diary, follow a bladder training program, or make dietary changes.  · If told by your health care provider, do Kegel exercises to strengthen the muscles that help control urination.  · Take over-the-counter and prescription medicines only as told by your health care provider.  · Contact a health care provider if your symptoms do not improve or get worse.  This information is not intended to replace advice given to you by your health care provider. Make sure you discuss any questions you have with your health care provider.  Document Released: 10/14/2010 Document Revised: 06/27/2019 Document Reviewed: 06/27/2019  [employer] Patient Education © 2020 [employer]

## 2020-08-06 DIAGNOSIS — N30.00 ACUTE CYSTITIS WITHOUT HEMATURIA: ICD-10-CM

## 2020-08-08 LAB
BACTERIA UR CULT: NORMAL
SIGNIFICANT IND 70042: NORMAL
SITE SITE: NORMAL
SOURCE SOURCE: NORMAL

## 2020-08-20 NOTE — CARE PLAN
Problem: Safety  Goal: Will remain free from falls  Provided assistance with ambulation. Fall prevention measures in place. Hourly rounds ongoing.    Problem: Venous Thromboembolism (VTW)/Deep Vein Thrombosis (DVT) Prevention:  Goal: Patient will participate in Venous Thrombosis (VTE)/Deep Vein Thrombosis (DVT)Prevention Measures  SCD and foot pump connected.         DISCHARGE

## 2020-09-03 ENCOUNTER — OFFICE VISIT (OUTPATIENT)
Dept: MEDICAL GROUP | Facility: CLINIC | Age: 70
End: 2020-09-03
Payer: MEDICARE

## 2020-09-03 VITALS
HEART RATE: 75 BPM | HEIGHT: 65 IN | WEIGHT: 184 LBS | SYSTOLIC BLOOD PRESSURE: 122 MMHG | BODY MASS INDEX: 30.66 KG/M2 | DIASTOLIC BLOOD PRESSURE: 78 MMHG | OXYGEN SATURATION: 96 % | TEMPERATURE: 98.2 F | RESPIRATION RATE: 14 BRPM

## 2020-09-03 DIAGNOSIS — Z86.010 HISTORY OF COLON POLYPS: ICD-10-CM

## 2020-09-03 DIAGNOSIS — R11.0 CHRONIC NAUSEA: ICD-10-CM

## 2020-09-03 DIAGNOSIS — N32.81 OVERACTIVE BLADDER: ICD-10-CM

## 2020-09-03 DIAGNOSIS — D50.9 IRON DEFICIENCY ANEMIA, UNSPECIFIED IRON DEFICIENCY ANEMIA TYPE: ICD-10-CM

## 2020-09-03 DIAGNOSIS — Z00.00 PREVENTATIVE HEALTH CARE: ICD-10-CM

## 2020-09-03 DIAGNOSIS — I10 ESSENTIAL HYPERTENSION: ICD-10-CM

## 2020-09-03 DIAGNOSIS — E11.9 TYPE 2 DIABETES MELLITUS WITHOUT COMPLICATION, WITHOUT LONG-TERM CURRENT USE OF INSULIN (HCC): ICD-10-CM

## 2020-09-03 DIAGNOSIS — R42 DIZZINESS: ICD-10-CM

## 2020-09-03 DIAGNOSIS — K92.1 BLOODY STOOL: ICD-10-CM

## 2020-09-03 DIAGNOSIS — F32.A DEPRESSION, UNSPECIFIED DEPRESSION TYPE: ICD-10-CM

## 2020-09-03 DIAGNOSIS — C51.9 MALIGNANT NEOPLASM OF VULVA, UNSPECIFIED (HCC): ICD-10-CM

## 2020-09-03 DIAGNOSIS — N18.30 CHRONIC RENAL INSUFFICIENCY, STAGE III (MODERATE): ICD-10-CM

## 2020-09-03 DIAGNOSIS — F51.01 PRIMARY INSOMNIA: ICD-10-CM

## 2020-09-03 DIAGNOSIS — F33.41 RECURRENT MAJOR DEPRESSIVE DISORDER, IN PARTIAL REMISSION (HCC): ICD-10-CM

## 2020-09-03 PROBLEM — M25.579 PAIN IN JOINT INVOLVING ANKLE AND FOOT: Status: ACTIVE | Noted: 2019-08-07

## 2020-09-03 PROBLEM — M79.7 FIBROMYALGIA: Status: ACTIVE | Noted: 2020-09-03

## 2020-09-03 PROCEDURE — 99214 OFFICE O/P EST MOD 30 MIN: CPT | Performed by: PHYSICIAN ASSISTANT

## 2020-09-03 RX ORDER — SERTRALINE HYDROCHLORIDE 100 MG/1
100 TABLET, FILM COATED ORAL
Qty: 90 TAB | Refills: 3 | Status: SHIPPED | OUTPATIENT
Start: 2020-09-03 | End: 2020-10-28

## 2020-09-03 RX ORDER — TRAZODONE HYDROCHLORIDE 100 MG/1
100 TABLET ORAL
Qty: 90 TAB | Refills: 3 | Status: SHIPPED | OUTPATIENT
Start: 2020-09-03 | End: 2021-11-17

## 2020-09-03 RX ORDER — OXYBUTYNIN CHLORIDE 5 MG/1
TABLET ORAL
COMMUNITY
Start: 2015-03-06 | End: 2020-09-03

## 2020-09-03 RX ORDER — TRAMADOL HYDROCHLORIDE 50 MG/1
TABLET ORAL
COMMUNITY
Start: 2015-03-17 | End: 2020-09-03

## 2020-09-03 RX ORDER — LOSARTAN POTASSIUM 50 MG/1
50 TABLET ORAL DAILY
Qty: 90 TAB | Refills: 3 | Status: SHIPPED | OUTPATIENT
Start: 2020-09-03 | End: 2021-10-13 | Stop reason: SDUPTHER

## 2020-09-03 RX ORDER — SENNOSIDES A AND B 8.6 MG/1
TABLET, FILM COATED ORAL
COMMUNITY
End: 2021-06-02

## 2020-09-03 RX ORDER — LOSARTAN POTASSIUM 50 MG/1
TABLET ORAL
COMMUNITY
Start: 2015-03-06 | End: 2020-09-03 | Stop reason: SDUPTHER

## 2020-09-03 RX ORDER — ONDANSETRON 4 MG/1
4 TABLET, FILM COATED ORAL EVERY 6 HOURS PRN
Qty: 20 TAB | Refills: 1 | Status: SHIPPED | OUTPATIENT
Start: 2020-09-03 | End: 2020-09-13

## 2020-09-03 RX ORDER — NICOTINE POLACRILEX 4 MG/1
GUM, CHEWING ORAL
COMMUNITY
End: 2020-09-03

## 2020-09-03 ASSESSMENT — PATIENT HEALTH QUESTIONNAIRE - PHQ9
9. THOUGHTS THAT YOU WOULD BE BETTER OFF DEAD, OR OF HURTING YOURSELF: NOT AT ALL
2. FEELING DOWN, DEPRESSED, IRRITABLE, OR HOPELESS: NOT AT ALL
7. TROUBLE CONCENTRATING ON THINGS, SUCH AS READING THE NEWSPAPER OR WATCHING TELEVISION: NOT AT ALL
3. TROUBLE FALLING OR STAYING ASLEEP OR SLEEPING TOO MUCH: NEARLY EVERY DAY
5. POOR APPETITE OR OVEREATING: NEARLY EVERY DAY
SUM OF ALL RESPONSES TO PHQ QUESTIONS 1-9: 11
SUM OF ALL RESPONSES TO PHQ9 QUESTIONS 1 AND 2: 1
1. LITTLE INTEREST OR PLEASURE IN DOING THINGS: SEVERAL DAYS
6. FEELING BAD ABOUT YOURSELF - OR THAT YOU ARE A FAILURE OR HAVE LET YOURSELF OR YOUR FAMILY DOWN: NOT AL ALL
4. FEELING TIRED OR HAVING LITTLE ENERGY: NEARLY EVERY DAY
8. MOVING OR SPEAKING SO SLOWLY THAT OTHER PEOPLE COULD HAVE NOTICED. OR THE OPPOSITE, BEING SO FIGETY OR RESTLESS THAT YOU HAVE BEEN MOVING AROUND A LOT MORE THAN USUAL: SEVERAL DAYS

## 2020-09-03 NOTE — ASSESSMENT & PLAN NOTE
Patient was diagnosed August 2011 after discovering a lesion on her vulva.  Patient followed by Fabian Craig MD gynecologic oncology.  Needs to make appt for follow-up. 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  She was treated with chemo radiotherapy in January 2012.  In December 2012 a new lesion was noticed confirming recurrent vulvar cancer.  She was supposed to follow-up at the beginning of the year but did not due to COVID concerns.  I have instructed her to make her follow-up appointment and go in and see him since she has a history of recurring vulvar cancer.  We will follow-up with her after her appointment.

## 2020-09-04 NOTE — ASSESSMENT & PLAN NOTE
Patient been noticing dizziness for last few weeks.  Last lab work done April 2019 showed her to be anemic.  She has not followed up with her oncology doctor in a year.  We will repeat lab work and do iron studies to determine cause of the anemia.  She has been encouraged to make a follow-up appointment with oncology as well.  We will continue to follow closely.

## 2020-09-04 NOTE — ASSESSMENT & PLAN NOTE
This is a chronic condition for this patient.  She is currently on sertraline 100 mg daily.  She has been on this medication at his current dose for years.  Depression screening today shows uncontrolled depression.  When asked about it directly patient states that she is not depressed and feels fine.  She states that she feels the way she always feels.  She does not want to change her current medication dose nor does she wish to see anyone in behavioral health at this time.  We have discussed safety mechanisms and numbers she can call should she feel that she needs help.  We will continue to follow closely.

## 2020-09-04 NOTE — ASSESSMENT & PLAN NOTE
Patient has history of colon polyps.  Referral placed today to gastroenterology for routine colonoscopy.  We will follow-up with her after her colonoscopy.  We will continue to follow.

## 2020-09-04 NOTE — ASSESSMENT & PLAN NOTE
Chronic condition for this patient that started after her pelvic radiation for vulvar cancer.  Currently takes oxybutynin 5 mg tablet.  She states this helps but does not totally control the issue.  She will continue on her current medication at its current dose we will continue to monitor.

## 2020-09-04 NOTE — ASSESSMENT & PLAN NOTE
Not currently on medication.  She has been trying to manage this with diet.  She has not had lab checked recently.  Patient took herself off of her metformin previously.  Does not check blood sugars and it does not significantly modify her diet.  We will order lab work and follow-up after results.  At that time we will make a determination on whether she needs to be started back on her diabetes medication.  We will continue to follow.

## 2020-09-04 NOTE — PROGRESS NOTES
Chief Complaint   Patient presents with   • Hemorrhoids       HISTORY OF PRESENT ILLNESS: Patient is a 69 y.o. female established patient who presents today to discuss the following issues:    Malignant neoplasm of vulva, unspecified (HCC)  Patient was diagnosed August 2011 after discovering a lesion on her vulva.  Patient followed by Fabian Craig MD gynecologic oncology.  Needs to make appt for follow-up. 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  She was treated with chemo radiotherapy in January 2012.  In December 2012 a new lesion was noticed confirming recurrent vulvar cancer.  She was supposed to follow-up at the beginning of the year but did not due to COVID concerns.  I have instructed her to make her follow-up appointment and go in and see him since she has a history of recurring vulvar cancer.  We will follow-up with her after her appointment.    Diabetes mellitus, type II (HCC)  Not currently on medication.  She has been trying to manage this with diet.  She has not had lab checked recently.  Patient took herself off of her metformin previously.  Does not check blood sugars and it does not significantly modify her diet.  We will order lab work and follow-up after results.  At that time we will make a determination on whether she needs to be started back on her diabetes medication.  We will continue to follow.    Depression  This is a chronic condition for this patient.  She is currently on sertraline 100 mg daily.  She has been on this medication at his current dose for years.  Depression screening today shows uncontrolled depression.  When asked about it directly patient states that she is not depressed and feels fine.  She states that she feels the way she always feels.  She does not want to change her current medication dose nor does she wish to see anyone in behavioral health at this time.  We have discussed safety mechanisms and numbers she can call should she feel that she needs help.  We  will continue to follow closely.    Insomnia  Chronic condition for this patient for which she takes trazodone 100 mg nightly.  States that this seems to control her insomnia fairly well.  We will continue her medication at its current dose.  We will continue to follow.    History of colon polyps  Patient has history of colon polyps.  Referral placed today to gastroenterology for routine colonoscopy.  We will follow-up with her after her colonoscopy.  We will continue to follow.    Overactive bladder  Chronic condition for this patient that started after her pelvic radiation for vulvar cancer.  Currently takes oxybutynin 5 mg tablet.  She states this helps but does not totally control the issue.  She will continue on her current medication at its current dose we will continue to monitor.    Dizziness  Patient been noticing dizziness for last few weeks.  Last lab work done April 2019 showed her to be anemic.  She has not followed up with her oncology doctor in a year.  We will repeat lab work and do iron studies to determine cause of the anemia.  She has been encouraged to make a follow-up appointment with oncology as well.  We will continue to follow closely.    Preventative health care  Routine lab work due.  Orders placed today    Chronic renal insufficiency, stage III (moderate) (HCC)  This is a chronic condition for this patient.  Last lab work done in 2017.  New lab work ordered to evaluate status of kidney function.  We will follow-up with her with the results of this testing.  We will continue to follow.    Iron deficiency anemia  Lab work ordered to evaluate etiology of anemia    Hypertension  This is a chronic condition for this patient currently well controlled on losartan 50 mg daily.  Blood pressure in the office today 122/78.  She will continue her medication at its current dose.  We will continue to follow.  Refills have been placed today.    Bloody stool  Patient states that she has been noticing  small caliber, bloody stool.  Patient has a history of hemorrhoids.  She states that she has blood streaking on the stool, small amounts of blood on the toilet tissue but not enough blood to turn the toilet water red.  We have referred her for colonoscopy.  We will follow-up with results.      Patient Active Problem List    Diagnosis Date Noted   • Bloody stool 09/08/2020   • Chronic nausea 09/08/2020   • Fibromyalgia 09/03/2020   • Abdominal aortic aneurysm (AAA) ([facility]) 08/05/2020   • Baker's cyst 08/05/2020   • Chronic renal insufficiency, stage III (moderate) ([facility]) 08/05/2020   • Dyslipidemia 08/05/2020   • Hemorrhoids 08/05/2020   • Hip pain, chronic 08/05/2020   • Hypomagnesemia 08/05/2020   • Iron deficiency anemia 08/05/2020   • Personal history of other infectious and parasitic diseases 08/05/2020   • Atherosclerosis of aorta ([facility]) 05/29/2020   • Pain in lower jaw 05/29/2020   • Elevated blood sugar 11/15/2019   • Iliotibial band syndrome of left side 11/15/2019   • Drug-induced constipation 11/15/2019   • History of diabetes mellitus 08/07/2019   • Pain in joint involving ankle and foot 08/07/2019   • Weakness 08/07/2019   • Preventative health care 07/10/2019   • Balance problem 06/07/2019   • Dizziness 06/07/2019   • 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   • Overactive bladder 03/22/2019   • Neck pain 03/22/2019   • History of cancer of vulva 07/19/2018   • History of colon polyps 07/19/2018   • Varicose veins of both lower extremities 07/19/2018   • Insomnia 07/21/2015   • 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   • Malignant neoplasm of vulva, unspecified ([facility]) 03/06/2015   • Diabetes mellitus, type II ([facility]) 09/09/2014   • Hypertension 09/09/2014   • Gastroesophageal reflux disease 09/09/2014   • Chronic low back pain 09/09/2014   • Depression 07/09/2014   • Malignant neoplasm ([facility]) 07/09/2014       Allergies:Doxycycline    Current Outpatient Medications   Medication Sig Dispense  Refill   • sennosides (SENOKOT) 8.6 MG Tab SENOKOT 8.6 MG TABS     • ondansetron (ZOFRAN) 4 MG Tab tablet Take 1 Tab by mouth every 6 hours as needed for Nausea/Vomiting for up to 10 days. 20 Tab 1   • losartan (COZAAR) 50 MG Tab Take 1 Tab by mouth every day. 90 Tab 3   • sertraline (ZOLOFT) 100 MG Tab Take 1 Tab by mouth every day. 90 Tab 3   • traZODone (DESYREL) 100 MG Tab Take 1 Tab by mouth every bedtime. 90 Tab 3   • omeprazole (PRILOSEC) 20 MG delayed-release capsule Take 1 Cap by mouth every day. 90 Cap 3   • oxybutynin (DITROPAN) 5 MG Tab TAKE 2 TABLETS BY MOUTH EVERY NIGHT AT BEDTIME 180 Tab 3   • oxycodone-acetaminophen (PERCOCET-10)  MG Tab Take 1-2 Tabs by mouth every four hours as needed for Severe Pain.       No current facility-administered medications for this visit.        Hospital Outpatient Visit on 08/05/2020   Component Date Value Ref Range Status   • Significant Indicator 08/05/2020 NEG   Final   • Source 08/05/2020 UR   Final   • Site 08/05/2020 -   Final   • Culture Result 08/05/2020 Mixed skin pat 10-50,000 cfu/mL   Final   Office Visit on 08/05/2020   Component Date Value Ref Range Status   • POC Color 08/05/2020 Divya  Negative Final   • POC Appearance 08/05/2020 Cloudy  Negative Final   • POC Leukocyte Esterase 08/05/2020 Trace  Negative Final   • POC Nitrites 08/05/2020 Negative  Negative Final   • POC Urobiligen 08/05/2020 Negative  Negative (0.2) mg/dL Final   • POC Protein 08/05/2020 Negative  Negative mg/dL Final   • POC Urine PH 08/05/2020 5.0  5.0 - 8.0 Final   • POC Blood 08/05/2020 Negative  Negative Final   • POC Specific Gravity 08/05/2020 1.030  <1.005 - >1.030 Final   • POC Ketones 08/05/2020 Negative  Negative mg/dL Final   • POC Bilirubin 08/05/2020 Negative  Negative mg/dL Final   • POC Glucose 08/05/2020 Negative  Negative mg/dL Final   Hospital Outpatient Visit on 04/22/2020   Component Date Value Ref Range Status   • Significant Indicator 04/22/2020 NEG    Final   • Source 2020 UR   Final   • Site 2020 -   Final   • Culture Result 2020 Mixed skin pat 10-50,000 cfu/mL   Final   Office Visit on 2020   Component Date Value Ref Range Status   • POC Color 2020 Yellow  Negative Final   • POC Appearance 2020 Clear  Negative Final   • POC Leukocyte Esterase 2020 Negative  Negative Final   • POC Nitrites 2020 Negative  Negative Final   • POC Urobiligen 2020 Negative  Negative (0.2) mg/dL Final   • POC Protein 2020 Trace  Negative mg/dL Final   • POC Urine PH 2020 5.5  5.0 - 8.0 Final   • POC Blood 2020 Negative  Negative Final   • POC Specific Gravity 2020 1.025  <1.005 - >1.030 Final   • POC Ketones 2020 Negative  Negative mg/dL Final   • POC Bilirubin 2020 Negative  Negative mg/dL Final   • POC Glucose 2020 Negative  Negative mg/dL Final   ]    The ASCVD Risk score (Faheem LAUREN Jr., et al., 2013) failed to calculate for the following reasons:    Cannot find a previous HDL lab    Cannot find a previous total cholesterol lab    Social History     Tobacco Use   • Smoking status: Never Smoker   • Smokeless tobacco: Never Used   Substance Use Topics   • Alcohol use: No   • Drug use: No       Family Status   Relation Name Status   • Mo     • Fa     • Sis  Alive     Family History   Problem Relation Age of Onset   • Cancer Mother    • Heart Disease Father        Patient's last menstrual period was 10/01/1990.    Health Maintenance Summary                IMM ZOSTER VACCINES Overdue 10/3/1998     MAMMOGRAM Overdue 2015      Previously completed 2014      Patient has more history with this topic...    Annual Wellness Visit Overdue 9/10/2015      Done 2014     BONE DENSITY Overdue 2019      Previously completed 2014     PAP SMEAR Overdue 2020      Done 2017 PATHOLOGY GYN SPECIMEN     Patient has more history with this topic...    IMM INFLUENZA  "Overdue 9/1/2020      Done 11/15/2019 Imm Admin: Influenza Vaccine Adult HD     Patient has more history with this topic...    COLONOSCOPY Postponed 1/1/2025 Originally 9/9/2017. Patient Refused     Previously completed 9/9/2007     IMM DTaP/Tdap/Td Vaccine Next Due 3/2/2022      Done 3/2/2012 Imm Admin: Tdap Vaccine           Review of Systems:   Constitutional: Negative for fever, chills, weight change, fatigue, loss of appetite.  HNT: Negative for nosebleeds, congestion, odynophagia, sore throat or changes in taste.    Eyes: Negative for vision changes.   Ears: Negative for recent changes in hearing, pain or discharge.  Neck: Negative for pain, swelling, lumps or goiter.  Respiratory: Negative for cough, sputum production, shortness of breath and wheezing.    Cardiovascular: Negative for chest pain, palpitations, orthopnea and leg swelling.   Gastrointestinal: Positive for chronic nausea.  Negative for constipation, diarrhea, heartburn, dysphagia, vomiting or abdominal pain.   Genitourinary: Positive for urgency and frequency.  Negative for dysuria.   Musculoskeletal: Negative for myalgias, joint pain, and back pain.  Skin: Negative for skin, hair or nail changes, rash, itching.   Neurological: Positive for dizziness.  Negative for tingling, tremors, sensory change, gait/coordination changes, focal weakness and headaches.   Endo/Heme/Allergies: Does not bruise/bleed easily.   Psychiatric/Behavioral: Positive for depression.  Negative for suicidal ideas and memory loss.   The patient has insomnia.  The patient is not nervous/anxious.        Exam:  /78 (BP Location: Left arm, Patient Position: Sitting, BP Cuff Size: Adult)   Pulse 75   Temp 36.8 °C (98.2 °F) (Temporal)   Resp 14   Ht 1.651 m (5' 5\")   Wt 83.5 kg (184 lb)   SpO2 96%  Body mass index is 30.62 kg/m².  General:  Well nourished, obese female. No apparent distress.  Eyes: EOM intact, PERRL, conjunctiva non-injected, sclera non-icteric.  Ears: " Roselyn pinnae, external auditory canals, TM pearly gray with normal light reflex bilaterally.  Neck: Supple with no cervical lymphadenopathy, JVD, palpable thyroid nodules or carotid bruits.  Pulmonary: Clear to ausculation bilaterally. Normal effort. No rales, ronchi, or wheezing.  Cardiovascular: Regular rate and rhythm without murmur, rub or gallop.   Extremities: Full range of motion. Warm and well perfused with no edema.  Skin: Intact with no obvious rashes or lesions.  Neuro: Cranial nerves I-XII intact.  Psych: Alert and oriented x 3.  Appropriately dressed. Mood and affect appropriate.      Assessment/Plan:  1. Depression, unspecified depression type  sertraline (ZOLOFT) 100 MG Tab   2. Malignant neoplasm of vulva, unspecified (HCC)     3. Type 2 diabetes mellitus without complication, without long-term current use of insulin ([facility])  HEMOGLOBIN A1C   4. Essential hypertension  losartan (COZAAR) 50 MG Tab   5. Recurrent major depressive disorder, in partial remission ([facility])  sertraline (ZOLOFT) 100 MG Tab   6. Primary insomnia  traZODone (DESYREL) 100 MG Tab   7. Overactive bladder     8. Preventative health care  Lipid Profile    VITAMIN D,25 HYDROXY   9. History of colon polyps  REFERRAL TO GASTROENTEROLOGY   10. Bloody stool  REFERRAL TO GASTROENTEROLOGY   11. Chronic nausea  ondansetron (ZOFRAN) 4 MG Tab tablet   12. Iron deficiency anemia, unspecified iron deficiency anemia type  CBC WITH DIFFERENTIAL    IRON/TOTAL IRON BIND   13. Chronic renal insufficiency, stage III (moderate) (HCC)  Comp Metabolic Panel    ESTIMATED GFR   14. Dizziness  CBC WITH DIFFERENTIAL    Comp Metabolic Panel       Reviewed risks and benefits of treatment plan. Patient verbally agrees to plan of care.     Return in about 2 months (around 11/3/2020).    Please note that this dictation was created using voice recognition software. I have made every reasonable attempt to correct obvious errors, but I expect that there are errors of  grammar and possibly content that I did not discover before finalizing the note.

## 2020-09-04 NOTE — ASSESSMENT & PLAN NOTE
Chronic condition for this patient for which she takes trazodone 100 mg nightly.  States that this seems to control her insomnia fairly well.  We will continue her medication at its current dose.  We will continue to follow.

## 2020-09-04 NOTE — ASSESSMENT & PLAN NOTE
This is a chronic condition for this patient.  Last lab work done in 2017.  New lab work ordered to evaluate status of kidney function.  We will follow-up with her with the results of this testing.  We will continue to follow.

## 2020-09-08 PROBLEM — R11.0 CHRONIC NAUSEA: Status: ACTIVE | Noted: 2020-09-08

## 2020-09-08 PROBLEM — K92.1 BLOODY STOOL: Status: ACTIVE | Noted: 2020-09-08

## 2020-09-09 NOTE — ASSESSMENT & PLAN NOTE
This is a chronic condition for this patient currently well controlled on losartan 50 mg daily.  Blood pressure in the office today 122/78.  She will continue her medication at its current dose.  We will continue to follow.  Refills have been placed today.

## 2020-09-09 NOTE — ASSESSMENT & PLAN NOTE
Patient states that she has been noticing small caliber, bloody stool.  Patient has a history of hemorrhoids.  She states that she has blood streaking on the stool, small amounts of blood on the toilet tissue but not enough blood to turn the toilet water red.  We have referred her for colonoscopy.  We will follow-up with results.

## 2020-09-14 ENCOUNTER — HOSPITAL ENCOUNTER (OUTPATIENT)
Dept: LAB | Facility: MEDICAL CENTER | Age: 70
End: 2020-09-14
Attending: PHYSICIAN ASSISTANT
Payer: MEDICARE

## 2020-09-14 ENCOUNTER — OFFICE VISIT (OUTPATIENT)
Dept: URGENT CARE | Facility: PHYSICIAN GROUP | Age: 70
End: 2020-09-14
Payer: MEDICARE

## 2020-09-14 VITALS
BODY MASS INDEX: 29.02 KG/M2 | DIASTOLIC BLOOD PRESSURE: 76 MMHG | HEART RATE: 79 BPM | HEIGHT: 66 IN | OXYGEN SATURATION: 97 % | RESPIRATION RATE: 16 BRPM | SYSTOLIC BLOOD PRESSURE: 120 MMHG | WEIGHT: 180.6 LBS | TEMPERATURE: 98.1 F

## 2020-09-14 DIAGNOSIS — R30.0 DYSURIA: ICD-10-CM

## 2020-09-14 DIAGNOSIS — R42 DIZZINESS: ICD-10-CM

## 2020-09-14 DIAGNOSIS — D50.9 IRON DEFICIENCY ANEMIA, UNSPECIFIED IRON DEFICIENCY ANEMIA TYPE: ICD-10-CM

## 2020-09-14 DIAGNOSIS — Z00.00 PREVENTATIVE HEALTH CARE: ICD-10-CM

## 2020-09-14 DIAGNOSIS — E11.9 TYPE 2 DIABETES MELLITUS WITHOUT COMPLICATION, WITHOUT LONG-TERM CURRENT USE OF INSULIN (HCC): ICD-10-CM

## 2020-09-14 DIAGNOSIS — N30.90 CYSTITIS: ICD-10-CM

## 2020-09-14 DIAGNOSIS — N18.30 CHRONIC RENAL INSUFFICIENCY, STAGE III (MODERATE): ICD-10-CM

## 2020-09-14 DIAGNOSIS — R10.9 FLANK PAIN: ICD-10-CM

## 2020-09-14 DIAGNOSIS — R10.13 EPIGASTRIC PAIN: ICD-10-CM

## 2020-09-14 LAB
25(OH)D3 SERPL-MCNC: 38 NG/ML (ref 30–100)
ALBUMIN SERPL BCP-MCNC: 3.8 G/DL (ref 3.2–4.9)
ALBUMIN/GLOB SERPL: 1.4 G/DL
ALP SERPL-CCNC: 122 U/L (ref 30–99)
ALT SERPL-CCNC: 11 U/L (ref 2–50)
ANION GAP SERPL CALC-SCNC: 16 MMOL/L (ref 7–16)
APPEARANCE UR: CLEAR
AST SERPL-CCNC: 12 U/L (ref 12–45)
BASOPHILS # BLD AUTO: 0.3 % (ref 0–1.8)
BASOPHILS # BLD: 0.02 K/UL (ref 0–0.12)
BILIRUB SERPL-MCNC: 0.5 MG/DL (ref 0.1–1.5)
BILIRUB UR STRIP-MCNC: ABNORMAL MG/DL
BUN SERPL-MCNC: 16 MG/DL (ref 8–22)
CALCIUM SERPL-MCNC: 8.8 MG/DL (ref 8.5–10.5)
CHLORIDE SERPL-SCNC: 97 MMOL/L (ref 96–112)
CHOLEST SERPL-MCNC: 178 MG/DL (ref 100–199)
CO2 SERPL-SCNC: 26 MMOL/L (ref 20–33)
COLOR UR AUTO: ABNORMAL
CREAT SERPL-MCNC: 0.93 MG/DL (ref 0.5–1.4)
EOSINOPHIL # BLD AUTO: 0.23 K/UL (ref 0–0.51)
EOSINOPHIL NFR BLD: 3.1 % (ref 0–6.9)
ERYTHROCYTE [DISTWIDTH] IN BLOOD BY AUTOMATED COUNT: 44.3 FL (ref 35.9–50)
EST. AVERAGE GLUCOSE BLD GHB EST-MCNC: 137 MG/DL
FASTING STATUS PATIENT QL REPORTED: NORMAL
GLOBULIN SER CALC-MCNC: 2.8 G/DL (ref 1.9–3.5)
GLUCOSE SERPL-MCNC: 116 MG/DL (ref 65–99)
GLUCOSE UR STRIP.AUTO-MCNC: NEGATIVE MG/DL
HBA1C MFR BLD: 6.4 % (ref 0–5.6)
HCT VFR BLD AUTO: 42.1 % (ref 37–47)
HDLC SERPL-MCNC: 34 MG/DL
HGB BLD-MCNC: 13.9 G/DL (ref 12–16)
IMM GRANULOCYTES # BLD AUTO: 0.17 K/UL (ref 0–0.11)
IMM GRANULOCYTES NFR BLD AUTO: 2.3 % (ref 0–0.9)
IRON SATN MFR SERPL: 32 % (ref 15–55)
IRON SERPL-MCNC: 83 UG/DL (ref 40–170)
KETONES UR STRIP.AUTO-MCNC: NEGATIVE MG/DL
LDLC SERPL CALC-MCNC: 114 MG/DL
LEUKOCYTE ESTERASE UR QL STRIP.AUTO: ABNORMAL
LYMPHOCYTES # BLD AUTO: 1.01 K/UL (ref 1–4.8)
LYMPHOCYTES NFR BLD: 13.4 % (ref 22–41)
MCH RBC QN AUTO: 30.3 PG (ref 27–33)
MCHC RBC AUTO-ENTMCNC: 33 G/DL (ref 33.6–35)
MCV RBC AUTO: 91.7 FL (ref 81.4–97.8)
MONOCYTES # BLD AUTO: 0.6 K/UL (ref 0–0.85)
MONOCYTES NFR BLD AUTO: 8 % (ref 0–13.4)
NEUTROPHILS # BLD AUTO: 5.51 K/UL (ref 2–7.15)
NEUTROPHILS NFR BLD: 72.9 % (ref 44–72)
NITRITE UR QL STRIP.AUTO: NEGATIVE
NRBC # BLD AUTO: 0 K/UL
NRBC BLD-RTO: 0 /100 WBC
PH UR STRIP.AUTO: 5.5 [PH] (ref 5–8)
PLATELET # BLD AUTO: 297 K/UL (ref 164–446)
PMV BLD AUTO: 10.4 FL (ref 9–12.9)
POTASSIUM SERPL-SCNC: 3 MMOL/L (ref 3.6–5.5)
PROT SERPL-MCNC: 6.6 G/DL (ref 6–8.2)
PROT UR QL STRIP: 30 MG/DL
RBC # BLD AUTO: 4.59 M/UL (ref 4.2–5.4)
RBC UR QL AUTO: ABNORMAL
SODIUM SERPL-SCNC: 139 MMOL/L (ref 135–145)
SP GR UR STRIP.AUTO: 1.02
TIBC SERPL-MCNC: 259 UG/DL (ref 250–450)
TRIGL SERPL-MCNC: 150 MG/DL (ref 0–149)
UIBC SERPL-MCNC: 176 UG/DL (ref 110–370)
UROBILINOGEN UR STRIP-MCNC: 0.2 MG/DL
WBC # BLD AUTO: 7.5 K/UL (ref 4.8–10.8)

## 2020-09-14 PROCEDURE — 80053 COMPREHEN METABOLIC PANEL: CPT

## 2020-09-14 PROCEDURE — 36415 COLL VENOUS BLD VENIPUNCTURE: CPT

## 2020-09-14 PROCEDURE — 83550 IRON BINDING TEST: CPT

## 2020-09-14 PROCEDURE — 81002 URINALYSIS NONAUTO W/O SCOPE: CPT | Performed by: NURSE PRACTITIONER

## 2020-09-14 PROCEDURE — 99214 OFFICE O/P EST MOD 30 MIN: CPT | Performed by: NURSE PRACTITIONER

## 2020-09-14 PROCEDURE — 85025 COMPLETE CBC W/AUTO DIFF WBC: CPT

## 2020-09-14 PROCEDURE — 83540 ASSAY OF IRON: CPT

## 2020-09-14 PROCEDURE — 82306 VITAMIN D 25 HYDROXY: CPT

## 2020-09-14 PROCEDURE — 87086 URINE CULTURE/COLONY COUNT: CPT

## 2020-09-14 PROCEDURE — 83036 HEMOGLOBIN GLYCOSYLATED A1C: CPT | Mod: GA

## 2020-09-14 PROCEDURE — 80061 LIPID PANEL: CPT

## 2020-09-14 RX ORDER — SULFAMETHOXAZOLE AND TRIMETHOPRIM 800; 160 MG/1; MG/1
1 TABLET ORAL 2 TIMES DAILY
Qty: 10 TAB | Refills: 0 | Status: SHIPPED | OUTPATIENT
Start: 2020-09-14 | End: 2020-09-19

## 2020-09-14 ASSESSMENT — ENCOUNTER SYMPTOMS
FLANK PAIN: 1
FEVER: 0
CHILLS: 0

## 2020-09-14 NOTE — PROGRESS NOTES
Subjective:      Shashank Christian is a 69 y.o. female who presents with Flank Pain    Past Medical History:   Diagnosis Date   • Depression 7/21/2015   • GERD (gastroesophageal reflux disease)    • Hypertension    • Insomnia 7/21/2015   • Overactive bladder    • Urinary incontinence with continuous leakage    • Vitamin D deficiency 7/21/2015   • Vulvar cancer (HCC)     had chemo and radiation and then surgery for recurrence, Dr. SOMMER   • Vulvar cancer (HCC)      Social History     Socioeconomic History   • Marital status:      Spouse name: Not on file   • Number of children: Not on file   • Years of education: Not on file   • Highest education level: Not on file   Occupational History   • Not on file   Social Needs   • Financial resource strain: Not on file   • Food insecurity     Worry: Not on file     Inability: Not on file   • Transportation needs     Medical: Not on file     Non-medical: Not on file   Tobacco Use   • Smoking status: Never Smoker   • Smokeless tobacco: Never Used   Substance and Sexual Activity   • Alcohol use: No   • Drug use: No   • Sexual activity: Not Currently   Lifestyle   • Physical activity     Days per week: Not on file     Minutes per session: Not on file   • Stress: Not on file   Relationships   • Social connections     Talks on phone: Not on file     Gets together: Not on file     Attends Jew service: Not on file     Active member of club or organization: Not on file     Attends meetings of clubs or organizations: Not on file     Relationship status: Not on file   • Intimate partner violence     Fear of current or ex partner: Not on file     Emotionally abused: Not on file     Physically abused: Not on file     Forced sexual activity: Not on file   Other Topics Concern   • Not on file   Social History Narrative   • Not on file     Family History   Problem Relation Age of Onset   • Cancer Mother    • Heart Disease Father        Allergies: Zestril  [lisinopril] and  "Doxycycline    Patient is a 69-year-old female who presents today with complaint of intermittent flank pain and mild dysuria.  States 2 days ago onset of symptoms occurred with epigastric pain followed by nausea and vomiting.  She states that the abdominal pain has resolved but she continues to feel poorly and has had a decreased appetite.  No diarrhea.  No fevers.            Other  This is a new problem. The problem occurs constantly. The problem has been unchanged. Pertinent negatives include no chills or fever. Associated symptoms comments: Flank pain. Nothing aggravates the symptoms. She has tried nothing for the symptoms. The treatment provided no relief.       Review of Systems   Constitutional: Negative for chills and fever.   Genitourinary: Positive for flank pain. Negative for dysuria, frequency, hematuria and urgency.   All other systems reviewed and are negative.         Objective:     /76   Pulse 79   Temp 36.7 °C (98.1 °F) (Temporal)   Resp 16   Ht 1.664 m (5' 5.5\")   Wt 81.9 kg (180 lb 9.6 oz)   LMP 10/01/1990   SpO2 97%   BMI 29.60 kg/m²      Physical Exam  Vitals signs reviewed.   Constitutional:       Appearance: Normal appearance.   Neck:      Musculoskeletal: Normal range of motion and neck supple.   Cardiovascular:      Rate and Rhythm: Normal rate and regular rhythm.      Heart sounds: Normal heart sounds.   Pulmonary:      Effort: Pulmonary effort is normal. No respiratory distress.      Breath sounds: Normal breath sounds. No stridor. No wheezing, rhonchi or rales.   Chest:      Chest wall: No tenderness.   Abdominal:      General: Abdomen is flat. Bowel sounds are normal. There is no distension.      Tenderness: There is no abdominal tenderness. There is no right CVA tenderness, left CVA tenderness, guarding or rebound.   Musculoskeletal: Normal range of motion.   Skin:     General: Skin is warm and dry.      Capillary Refill: Capillary refill takes less than 2 seconds. "   Neurological:      Mental Status: She is alert and oriented to person, place, and time.   Psychiatric:         Mood and Affect: Mood normal.         Behavior: Behavior normal.         Thought Content: Thought content normal.         Judgment: Judgment normal.     UA: small leukocytes       EKG: Sinus rhythm, rate 58; no ST elevation or depression. No ischemic changes, no ectopy.      Assessment/Plan:   Cystitis  Dysuria    Bactrim DS  Urine culture  Push fluids  ER precautions for inability to tolerate fluids, fever, weakness, flu like symptoms.       There are no diagnoses linked to this encounter.

## 2020-09-17 ENCOUNTER — PATIENT MESSAGE (OUTPATIENT)
Dept: URGENT CARE | Facility: CLINIC | Age: 70
End: 2020-09-17

## 2020-09-17 LAB
BACTERIA UR CULT: NORMAL
SIGNIFICANT IND 70042: NORMAL
SITE SITE: NORMAL
SOURCE SOURCE: NORMAL

## 2020-09-18 NOTE — PROGRESS NOTES
Attempted to notify patient by phone of urine culture results.  No answer.  Left detailed voice message with results and requested callback for follow-up.  Patient also sent a message via [employer].

## 2020-10-28 ENCOUNTER — OFFICE VISIT (OUTPATIENT)
Dept: MEDICAL GROUP | Facility: CLINIC | Age: 70
End: 2020-10-28
Payer: MEDICARE

## 2020-10-28 VITALS
OXYGEN SATURATION: 93 % | HEIGHT: 65 IN | BODY MASS INDEX: 29.82 KG/M2 | TEMPERATURE: 98.6 F | SYSTOLIC BLOOD PRESSURE: 140 MMHG | WEIGHT: 179 LBS | DIASTOLIC BLOOD PRESSURE: 82 MMHG | HEART RATE: 64 BPM

## 2020-10-28 DIAGNOSIS — Z23 NEED FOR VACCINATION: ICD-10-CM

## 2020-10-28 DIAGNOSIS — R73.03 PREDIABETES: ICD-10-CM

## 2020-10-28 DIAGNOSIS — F33.41 RECURRENT MAJOR DEPRESSIVE DISORDER, IN PARTIAL REMISSION (HCC): ICD-10-CM

## 2020-10-28 DIAGNOSIS — Z85.44 HISTORY OF CANCER OF VULVA: ICD-10-CM

## 2020-10-28 DIAGNOSIS — K21.00 GASTROESOPHAGEAL REFLUX DISEASE WITH ESOPHAGITIS WITHOUT HEMORRHAGE: ICD-10-CM

## 2020-10-28 DIAGNOSIS — N18.31 STAGE 3A CHRONIC KIDNEY DISEASE: ICD-10-CM

## 2020-10-28 DIAGNOSIS — K21.00 GASTROESOPHAGEAL REFLUX DISEASE WITH ESOPHAGITIS: ICD-10-CM

## 2020-10-28 DIAGNOSIS — R10.13 EPIGASTRIC ABDOMINAL PAIN: ICD-10-CM

## 2020-10-28 DIAGNOSIS — F32.A DEPRESSION, UNSPECIFIED DEPRESSION TYPE: ICD-10-CM

## 2020-10-28 DIAGNOSIS — E78.5 DYSLIPIDEMIA: ICD-10-CM

## 2020-10-28 PROBLEM — I65.23 OCCLUSION AND STENOSIS OF BILATERAL CAROTID ARTERIES: Status: ACTIVE | Noted: 2020-10-28

## 2020-10-28 PROBLEM — Z86.39 HISTORY OF DIABETES MELLITUS: Status: RESOLVED | Noted: 2019-08-07 | Resolved: 2020-10-28

## 2020-10-28 PROBLEM — I70.213 INTERMITTENT CLAUDICATION OF BOTH LOWER EXTREMITIES DUE TO ATHEROSCLEROSIS (HCC): Status: ACTIVE | Noted: 2020-10-28

## 2020-10-28 PROCEDURE — G0008 ADMIN INFLUENZA VIRUS VAC: HCPCS | Performed by: PHYSICIAN ASSISTANT

## 2020-10-28 PROCEDURE — 99214 OFFICE O/P EST MOD 30 MIN: CPT | Mod: 25 | Performed by: PHYSICIAN ASSISTANT

## 2020-10-28 PROCEDURE — 90662 IIV NO PRSV INCREASED AG IM: CPT | Performed by: PHYSICIAN ASSISTANT

## 2020-10-28 RX ORDER — OMEPRAZOLE 20 MG/1
40 CAPSULE, DELAYED RELEASE ORAL
Qty: 180 CAP | Refills: 3 | Status: SHIPPED | OUTPATIENT
Start: 2020-10-28 | End: 2021-11-17 | Stop reason: SDUPTHER

## 2020-10-28 RX ORDER — OXYCODONE AND ACETAMINOPHEN 10; 325 MG/1; MG/1
TABLET ORAL
COMMUNITY
End: 2020-10-28

## 2020-10-28 RX ORDER — AMLODIPINE BESYLATE 10 MG/1
TABLET ORAL
COMMUNITY
End: 2020-10-28

## 2020-10-28 RX ORDER — TRAZODONE HYDROCHLORIDE 100 MG/1
TABLET ORAL
COMMUNITY
End: 2020-10-28

## 2020-10-28 RX ORDER — SERTRALINE HYDROCHLORIDE 100 MG/1
TABLET, FILM COATED ORAL
COMMUNITY
End: 2020-10-28

## 2020-10-28 RX ORDER — SERTRALINE HYDROCHLORIDE 100 MG/1
200 TABLET, FILM COATED ORAL
Qty: 90 TAB | Refills: 3 | Status: SHIPPED | OUTPATIENT
Start: 2020-10-28 | End: 2020-12-09

## 2020-10-28 ASSESSMENT — FIBROSIS 4 INDEX: FIB4 SCORE: 0.88

## 2020-10-28 NOTE — PROGRESS NOTES
Chief Complaint   Patient presents with   • Establish Care     Establish care.       HISTORY OF PRESENT ILLNESS: Patient is a 70 y.o. female established patient who presents today to discuss the following issues:    Depression  This is a chronic condition for this patient.  She is currently taking sertraline 100 mg daily.  Her depression has been worsening lately as her   last month.  The depression was moderately controlled before but with the addition of her 's death she is willing now to let me adjust the dose of her sertraline.  We will increase her sertraline to 200 mg daily.  We will follow-up with her in 3 months and see how she is doing.    Prediabetes  This is been a chronic condition for this patient for years. Her A1c done 2020 is at 6.4.  This has increased from last year when her A1c was 5.6.  She admits to not eating well as she was caring for her ailing .  We had a long discussion today about diet and how important it was to keep her diet under control due to the fact that she is not currently able to exercise.  She agrees that she will try and do better as she does not want to go on medication.  We will continue to monitor.    Gastroesophageal reflux disease with esophagitis without hemorrhage  This is a chronic condition for this patient.  Patient states that recently her symptoms have gotten worse even with the omeprazole 20 mg.  The increased stress in her life and the death of her  have made her have a larger amount of epigastric pain along with the reflux.  We will increase her omeprazole to 40 mg as she has tried this recently and it worked well for her.  New prescription has been placed today.  We will continue to monitor.    History of cancer of vulva  Patient managed by Fabian Craig MD.  She has an appointment to follow-up with him 2020.  He will continue to manage her and we will continue to follow.    Stage 3 chronic kidney  disease  This is a intermittent problem for this patient.  Her GFR varies back and forth between 59 and 60 depending on how hydrated she is.  Labs done in September show her kidney function back down at 59.  I have explained to her how important it is to stay hydrated to keep your kidneys healthy.  She is agreeable to drinking more water.  We will continue to monitor.    Dyslipidemia  This is a chronic condition for this patient for which she tries to control with diet.  Labs done September 2020 show total cholesterol 178, triglycerides 150, HDL 34, .  She admits to not having a very good diet over the last few months as she would eat what ever her  would be willing to eat.  She admits to higher carbohydrates than she should be consuming and not being able to get exercise due to her chronic back pain.  We have again had the discussion about diet and the importance of maintaining lean protein, increase fruits and vegetables and decrease carbohydrates to help with her cholesterol levels, blood sugar levels and weight.  She is agreeable to getting better control of her diet.  We will follow-up with her in 3 months at which time we will consider doing another lipid profile to see how well she is doing.  At that time we will consider whether or not she needs to be on a statin.    Epigastric abdominal pain  This is new for this patient that started about 6 weeks ago.  Her  recently became seriously ill around that same time and her stress level was much increased.  Her  passed about 4 weeks ago but she is still having this epigastric pain along with an increase in her reflux.  I have increased her omeprazole to 40 mg daily as she states she has tried this dose recently and it has helped her.  We will continue to monitor      Patient Active Problem List    Diagnosis Date Noted   • Intermittent claudication of both lower extremities due to atherosclerosis (HCC) 10/28/2020   • Occlusion and  stenosis of bilateral carotid arteries 10/28/2020   • Epigastric abdominal pain 10/28/2020   • Bloody stool 09/08/2020   • Chronic nausea 09/08/2020   • Fibromyalgia 09/03/2020   • Abdominal aortic aneurysm without rupture ([facility]) 08/05/2020   • Baker's cyst 08/05/2020   • Stage 3 chronic kidney disease 08/05/2020   • Dyslipidemia 08/05/2020   • Hemorrhoids 08/05/2020   • Hip pain, chronic 08/05/2020   • Hypomagnesemia 08/05/2020   • Iron deficiency anemia 08/05/2020   • Personal history of other infectious and parasitic diseases 08/05/2020   • Atherosclerosis of aorta ([facility]) 05/29/2020   • Pain in lower jaw 05/29/2020   • Prediabetes 11/15/2019   • Iliotibial band syndrome of left side 11/15/2019   • Drug-induced constipation 11/15/2019   • Pain in joint involving ankle and foot 08/07/2019   • Weakness 08/07/2019   • Preventative health care 07/10/2019   • Balance problem 06/07/2019   • Dizziness 06/07/2019   • 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   • Overactive bladder 03/22/2019   • Neck pain 03/22/2019   • History of cancer of vulva 07/19/2018   • History of colon polyps 07/19/2018   • Varicose veins of both lower extremities 07/19/2018   • Insomnia 07/21/2015   • 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   • Malignant neoplasm of vulva, unspecified ([facility]) 03/06/2015   • Hypertension 09/09/2014   • Gastroesophageal reflux disease with esophagitis without hemorrhage 09/09/2014   • Chronic low back pain 09/09/2014   • Depression 07/09/2014   • Malignant neoplasm ([facility]) 07/09/2014       Allergies:Doxycycline    Current Outpatient Medications   Medication Sig Dispense Refill   • Diclofenac Sodium 1 % Gel APPLY 4 GRAMS TO AFFECTED AREA UP TO THREE TIMES A DAY AS NEEDED FOR PAIN     • sertraline (ZOLOFT) 100 MG Tab Take 2 Tabs by mouth every day. 90 Tab 3   • omeprazole (PRILOSEC) 20 MG delayed-release capsule Take 2 Caps by mouth every day. 180 Cap 3   • sennosides (SENOKOT) 8.6 MG Tab SENOKOT 8.6 MG  TABS     • losartan (COZAAR) 50 MG Tab Take 1 Tab by mouth every day. 90 Tab 3   • traZODone (DESYREL) 100 MG Tab Take 1 Tab by mouth every bedtime. 90 Tab 3   • oxybutynin (DITROPAN) 5 MG Tab TAKE 2 TABLETS BY MOUTH EVERY NIGHT AT BEDTIME 180 Tab 3   • oxycodone-acetaminophen (PERCOCET-10)  MG Tab Take 1-2 Tabs by mouth every four hours as needed for Severe Pain.       No current facility-administered medications for this visit.        Hospital Outpatient Visit on 09/14/2020   Component Date Value Ref Range Status   • Iron 09/14/2020 83  40 - 170 ug/dL Final   • Total Iron Binding 09/14/2020 259  250 - 450 ug/dL Final   • Unsat Iron Binding 09/14/2020 176  110 - 370 ug/dL Final   • % Saturation 09/14/2020 32  15 - 55 % Final   • 25-Hydroxy   Vitamin D 25 09/14/2020 38  30 - 100 ng/mL Final    Comment: Adult Ranges:   <20 ng/mL - Deficiency  20-29 ng/mL - Insufficiency   ng/mL - Sufficiency  Effective 3/18/2020, this electrochemiluminescence binding assay is  performed using Roche ghazal e immunoassay analyzer.  The [employer] Vitamin D  total II assay is intended for the quantitative determination of total 25  hydroxyvitamin D in human serum and plasma. This assay is to be used as an  aid in the assessment of vitamin D sufficiency in adults.     • Cholesterol,Tot 09/14/2020 178  100 - 199 mg/dL Final   • Triglycerides 09/14/2020 150* 0 - 149 mg/dL Final   • HDL 09/14/2020 34* >=40 mg/dL Final   • LDL 09/14/2020 114* <100 mg/dL Final   • GFR If  09/14/2020 >60  >60 mL/min/1.73 m 2 Final   • GFR If Non  09/14/2020 59* >60 mL/min/1.73 m 2 Final   • Sodium 09/14/2020 139  135 - 145 mmol/L Final   • Potassium 09/14/2020 3.0* 3.6 - 5.5 mmol/L Final   • Chloride 09/14/2020 97  96 - 112 mmol/L Final   • Co2 09/14/2020 26  20 - 33 mmol/L Final   • Anion Gap 09/14/2020 16.0  7.0 - 16.0 Final   • Glucose 09/14/2020 116* 65 - 99 mg/dL Final   • Bun 09/14/2020 16  8 - 22 mg/dL Final   •  Creatinine 09/14/2020 0.93  0.50 - 1.40 mg/dL Final   • Calcium 09/14/2020 8.8  8.5 - 10.5 mg/dL Final   • AST(SGOT) 09/14/2020 12  12 - 45 U/L Final   • ALT(SGPT) 09/14/2020 11  2 - 50 U/L Final   • Alkaline Phosphatase 09/14/2020 122* 30 - 99 U/L Final   • Total Bilirubin 09/14/2020 0.5  0.1 - 1.5 mg/dL Final   • Albumin 09/14/2020 3.8  3.2 - 4.9 g/dL Final   • Total Protein 09/14/2020 6.6  6.0 - 8.2 g/dL Final   • Globulin 09/14/2020 2.8  1.9 - 3.5 g/dL Final   • A-G Ratio 09/14/2020 1.4  g/dL Final   • WBC 09/14/2020 7.5  4.8 - 10.8 K/uL Final   • RBC 09/14/2020 4.59  4.20 - 5.40 M/uL Final   • Hemoglobin 09/14/2020 13.9  12.0 - 16.0 g/dL Final   • Hematocrit 09/14/2020 42.1  37.0 - 47.0 % Final   • MCV 09/14/2020 91.7  81.4 - 97.8 fL Final   • MCH 09/14/2020 30.3  27.0 - 33.0 pg Final   • MCHC 09/14/2020 33.0* 33.6 - 35.0 g/dL Final   • RDW 09/14/2020 44.3  35.9 - 50.0 fL Final   • Platelet Count 09/14/2020 297  164 - 446 K/uL Final   • MPV 09/14/2020 10.4  9.0 - 12.9 fL Final   • Neutrophils-Polys 09/14/2020 72.90* 44.00 - 72.00 % Final   • Lymphocytes 09/14/2020 13.40* 22.00 - 41.00 % Final   • Monocytes 09/14/2020 8.00  0.00 - 13.40 % Final   • Eosinophils 09/14/2020 3.10  0.00 - 6.90 % Final   • Basophils 09/14/2020 0.30  0.00 - 1.80 % Final   • Immature Granulocytes 09/14/2020 2.30* 0.00 - 0.90 % Final   • Nucleated RBC 09/14/2020 0.00  /100 WBC Final   • Neutrophils (Absolute) 09/14/2020 5.51  2.00 - 7.15 K/uL Final    Includes immature neutrophils, if present.   • Lymphs (Absolute) 09/14/2020 1.01  1.00 - 4.80 K/uL Final   • Monos (Absolute) 09/14/2020 0.60  0.00 - 0.85 K/uL Final   • Eos (Absolute) 09/14/2020 0.23  0.00 - 0.51 K/uL Final   • Baso (Absolute) 09/14/2020 0.02  0.00 - 0.12 K/uL Final   • Immature Granulocytes (abs) 09/14/2020 0.17* 0.00 - 0.11 K/uL Final   • NRBC (Absolute) 09/14/2020 0.00  K/uL Final   • Glycohemoglobin 09/14/2020 6.4* 0.0 - 5.6 % Final    Comment: Increased risk for  diabetes:  5.7 -6.4%  Diabetes:  >6.4%  Glycemic control for adults with diabetes:  <7.0%  The above interpretations are per ADA guidelines.  Diagnosis  of diabetes mellitus on the basis of elevated Hemoglobin A1c  should be confirmed by repeating the Hb A1c test.     • Est Avg Glucose 09/14/2020 137  mg/dL Final    Comment: The eAG calculation is based on the A1c-Derived Daily Glucose  (ADAG) study.  See the ADA's website for additional information.     • Fasting Status 09/14/2020 Fasting   Final   • Significant Indicator 09/14/2020 NEG   Final   • Source 09/14/2020 UR   Final   • Site 09/14/2020 -   Final   • Culture Result 09/14/2020 Mixed skin pat 10-50,000 cfu/mL   Final   Office Visit on 09/14/2020   Component Date Value Ref Range Status   • POC Color 09/14/2020 dark yellow  Negative Final   • POC Appearance 09/14/2020 clear  Negative Final   • POC Leukocyte Esterase 09/14/2020 trace  Negative Final   • POC Nitrites 09/14/2020 negative  Negative Final   • POC Urobiligen 09/14/2020 0.2  Negative (0.2) mg/dL Final   • POC Protein 09/14/2020 30  Negative mg/dL Final   • POC Urine PH 09/14/2020 5.5  5.0 - 8.0 Final   • POC Blood 09/14/2020 trace  Negative Final   • POC Specific Gravity 09/14/2020 1.025  <1.005 - >1.030 Final   • POC Ketones 09/14/2020 negative  Negative mg/dL Final   • POC Bilirubin 09/14/2020 small  Negative mg/dL Final   • POC Glucose 09/14/2020 negative  Negative mg/dL Final   Hospital Outpatient Visit on 08/05/2020   Component Date Value Ref Range Status   • Significant Indicator 08/05/2020 NEG   Final   • Source 08/05/2020 UR   Final   • Site 08/05/2020 -   Final   • Culture Result 08/05/2020 Mixed skin pat 10-50,000 cfu/mL   Final   Office Visit on 08/05/2020   Component Date Value Ref Range Status   • POC Color 08/05/2020 Divya  Negative Final   • POC Appearance 08/05/2020 Cloudy  Negative Final   • POC Leukocyte Esterase 08/05/2020 Trace  Negative Final   • POC Nitrites 08/05/2020  Negative  Negative Final   • POC Urobiligen 2020 Negative  Negative (0.2) mg/dL Final   • POC Protein 2020 Negative  Negative mg/dL Final   • POC Urine PH 2020 5.0  5.0 - 8.0 Final   • POC Blood 2020 Negative  Negative Final   • POC Specific Gravity 2020 1.030  <1.005 - >1.030 Final   • POC Ketones 2020 Negative  Negative mg/dL Final   • POC Bilirubin 2020 Negative  Negative mg/dL Final   • POC Glucose 2020 Negative  Negative mg/dL Final   ]    The ASCVD Risk score (Faheem LAUREN Jr., et al., 2013) failed to calculate for the following reasons:    The patient has a prior MI or stroke diagnosis    Social History     Tobacco Use   • Smoking status: Never Smoker   • Smokeless tobacco: Never Used   Substance Use Topics   • Alcohol use: No   • Drug use: No       Family Status   Relation Name Status   • Mo     • Fa     • Sis  Alive     Family History   Problem Relation Age of Onset   • Cancer Mother    • Heart Disease Father        Patient's last menstrual period was 10/01/1990.    Health Maintenance Summary                IMM ZOSTER VACCINES Overdue 10/3/1998     MAMMOGRAM Overdue 2015      Previously completed 2014      Patient has more history with this topic...    Annual Wellness Visit Overdue 9/10/2015      Done 2014     BONE DENSITY Overdue 2019      Previously completed 2014     PAP SMEAR Overdue 2020      Done 2017 PATHOLOGY GYN SPECIMEN     Patient has more history with this topic...    IMM INFLUENZA Overdue 2020      Done 11/15/2019 Imm Admin: Influenza Vaccine Adult HD     Patient has more history with this topic...    COLONOSCOPY Postponed 2025 Originally 2017. Patient Refused     Previously completed 2007     IMM DTaP/Tdap/Td Vaccine Next Due 3/2/2022      Done 3/2/2012 Imm Admin: Tdap Vaccine           Review of Systems:   Constitutional: Positive for fatigue.  Negative for fever, chills, weight change,  "loss of appetite.  HNT: Negative for nosebleeds, congestion, odynophagia, sore throat or changes in taste.    Eyes: Negative for vision changes.   Ears: Negative for recent changes in hearing, pain or discharge.  Neck: Negative for pain, swelling, lumps or goiter.  Respiratory: Negative for cough, sputum production, shortness of breath and wheezing.    Cardiovascular: Negative for chest pain, palpitations, orthopnea and leg swelling.   Gastrointestinal: Positive for heartburn, vague nausea and epigastric pain.  Negative for constipation, diarrhea, dysphagia, vomiting.   Genitourinary: Positive for urgency and frequency.  Negative for dysuria.   Musculoskeletal: Positive for chronic low back pain, hip pain, knee and ankle pain.  Negative for myalgias.  Skin: Negative for skin, hair or nail changes, rash, itching.   Neurological: Negative for dizziness, tingling, tremors, sensory change, gait/coordination changes, focal weakness and headaches.   Endo/Heme/Allergies: Does not bruise/bleed easily.   Psychiatric/Behavioral: Positive for depression and insomnia.  Negative for suicidal ideas and memory loss.  The patient is not nervous/anxious.        Exam:  /82   Pulse 64   Temp 37 °C (98.6 °F)   Ht 1.651 m (5' 5\")   Wt 81.2 kg (179 lb)   SpO2 93%  Body mass index is 29.79 kg/m².  General:  Well nourished, well developed, overweight female. No apparent distress.  Eyes: EOM intact, PERRL, conjunctiva non-injected, sclera non-icteric.  Ears: Roselyn pinnae, external auditory canals, TM pearly gray with normal light reflex bilaterally.  Neck: Supple with no cervical lymphadenopathy, JVD, palpable thyroid nodules or carotid bruits.  Pulmonary: Clear to ausculation bilaterally. Normal effort. No rales, ronchi, or wheezing.  Cardiovascular: Regular rate and rhythm without murmur, rub or gallop.   Extremities: Decreased range of motion in bilateral hips, left knee and ankle. Warm and well perfused with no edema.  Skin: " Intact with no obvious rashes or lesions.  Neuro: Cranial nerves I-XII intact.  Psych: Alert and oriented x 3.  Appropriately dressed. Mood and affect appropriate.      Assessment/Plan:  1. Depression, unspecified depression type  sertraline (ZOLOFT) 100 MG Tab   2. Recurrent major depressive disorder, in partial remission (HCC)  sertraline (ZOLOFT) 100 MG Tab   3. Gastroesophageal reflux disease with esophagitis  omeprazole (PRILOSEC) 20 MG delayed-release capsule   4. Need for vaccination  INFLUENZA VACCINE, HIGH DOSE (65+ ONLY)   5. Epigastric abdominal pain  omeprazole (PRILOSEC) 20 MG delayed-release capsule   6. Prediabetes     7. Gastroesophageal reflux disease with esophagitis without hemorrhage     8. History of cancer of vulva     9. Stage 3a chronic kidney disease     10. Dyslipidemia         Reviewed risks and benefits of treatment plan. Patient verbally agrees to plan of care.     Return in about 3 months (around 1/28/2021).    Please note that this dictation was created using voice recognition software. I have made every reasonable attempt to correct obvious errors, but I expect that there are errors of grammar and possibly content that I did not discover before finalizing the note.

## 2020-10-29 NOTE — ASSESSMENT & PLAN NOTE
This is a chronic condition for this patient.  She is currently taking sertraline 100 mg daily.  Her depression has been worsening lately as her   last month.  The depression was moderately controlled before but with the addition of her 's death she is willing now to let me adjust the dose of her sertraline.  We will increase her sertraline to 200 mg daily.  We will follow-up with her in 3 months and see how she is doing.

## 2020-10-29 NOTE — ASSESSMENT & PLAN NOTE
This is a chronic condition for this patient for which she tries to control with diet.  Labs done September 2020 show total cholesterol 178, triglycerides 150, HDL 34, .  She admits to not having a very good diet over the last few months as she would eat what ever her  would be willing to eat.  She admits to higher carbohydrates than she should be consuming and not being able to get exercise due to her chronic back pain.  We have again had the discussion about diet and the importance of maintaining lean protein, increase fruits and vegetables and decrease carbohydrates to help with her cholesterol levels, blood sugar levels and weight.  She is agreeable to getting better control of her diet.  We will follow-up with her in 3 months at which time we will consider doing another lipid profile to see how well she is doing.  At that time we will consider whether or not she needs to be on a statin.

## 2020-10-29 NOTE — ASSESSMENT & PLAN NOTE
This is a intermittent problem for this patient.  Her GFR varies back and forth between 59 and 60 depending on how hydrated she is.  Labs done in September show her kidney function back down at 59.  I have explained to her how important it is to stay hydrated to keep your kidneys healthy.  She is agreeable to drinking more water.  We will continue to monitor.

## 2020-10-29 NOTE — ASSESSMENT & PLAN NOTE
This is been a chronic condition for this patient for years. Her A1c done September 2020 is at 6.4.  This has increased from last year when her A1c was 5.6.  She admits to not eating well as she was caring for her ailing .  We had a long discussion today about diet and how important it was to keep her diet under control due to the fact that she is not currently able to exercise.  She agrees that she will try and do better as she does not want to go on medication.  We will continue to monitor.

## 2020-10-29 NOTE — ASSESSMENT & PLAN NOTE
This is new for this patient that started about 6 weeks ago.  Her  recently became seriously ill around that same time and her stress level was much increased.  Her  passed about 4 weeks ago but she is still having this epigastric pain along with an increase in her reflux.  I have increased her omeprazole to 40 mg daily as she states she has tried this dose recently and it has helped her.  We will continue to monitor

## 2020-10-29 NOTE — ASSESSMENT & PLAN NOTE
Patient managed by Fabian Craig MD.  She has an appointment to follow-up with him November 2, 2020.  He will continue to manage her and we will continue to follow.

## 2020-10-29 NOTE — ASSESSMENT & PLAN NOTE
This is a chronic condition for this patient.  Patient states that recently her symptoms have gotten worse even with the omeprazole 20 mg.  The increased stress in her life and the death of her  have made her have a larger amount of epigastric pain along with the reflux.  We will increase her omeprazole to 40 mg as she has tried this recently and it worked well for her.  New prescription has been placed today.  We will continue to monitor.

## 2020-12-07 ENCOUNTER — TELEPHONE (OUTPATIENT)
Dept: MEDICAL GROUP | Facility: CLINIC | Age: 70
End: 2020-12-07

## 2020-12-07 NOTE — TELEPHONE ENCOUNTER
Phone Number Called: [phone] (home)       Call outcome: Left detailed message for patient. Informed to call back with any additional questions.    Message: Pt came in wanting to talk to PCP, informed PCP was not here on Mondays but would get her the information. After Pt left was informed that Pt could just call referral center and get it changed. I called pt and LM stating the referral centers number.

## 2020-12-09 DIAGNOSIS — F32.A DEPRESSION, UNSPECIFIED DEPRESSION TYPE: ICD-10-CM

## 2020-12-09 DIAGNOSIS — F33.41 RECURRENT MAJOR DEPRESSIVE DISORDER, IN PARTIAL REMISSION (HCC): ICD-10-CM

## 2020-12-09 RX ORDER — SERTRALINE HYDROCHLORIDE 100 MG/1
TABLET, FILM COATED ORAL
Qty: 180 TAB | Refills: 3 | Status: SHIPPED | OUTPATIENT
Start: 2020-12-09 | End: 2021-11-17 | Stop reason: SDUPTHER

## 2020-12-09 NOTE — TELEPHONE ENCOUNTER
Was the patient seen in the last year in this department? Yes    Does patient have an active prescription for medications requested? Yes    Received Request Via: Pharmacy    Hospital Outpatient Visit on 09/14/2020   Component Date Value   • Iron 09/14/2020 83    • Total Iron Binding 09/14/2020 259    • Unsat Iron Binding 09/14/2020 176    • % Saturation 09/14/2020 32    • 25-Hydroxy   Vitamin D 25 09/14/2020 38    • Cholesterol,Tot 09/14/2020 178    • Triglycerides 09/14/2020 150*   • HDL 09/14/2020 34*   • LDL 09/14/2020 114*   • GFR If  09/14/2020 >60    • GFR If Non  [employer]* 09/14/2020 59*   • Sodium 09/14/2020 139    • Potassium 09/14/2020 3.0*   • Chloride 09/14/2020 97    • Co2 09/14/2020 26    • Anion Gap 09/14/2020 16.0    • Glucose 09/14/2020 116*   • Bun 09/14/2020 16    • Creatinine 09/14/2020 0.93    • Calcium 09/14/2020 8.8    • AST(SGOT) 09/14/2020 12    • ALT(SGPT) 09/14/2020 11    • Alkaline Phosphatase 09/14/2020 122*   • Total Bilirubin 09/14/2020 0.5    • Albumin 09/14/2020 3.8    • Total Protein 09/14/2020 6.6    • Globulin 09/14/2020 2.8    • A-G Ratio 09/14/2020 1.4    • WBC 09/14/2020 7.5    • RBC 09/14/2020 4.59    • Hemoglobin 09/14/2020 13.9    • Hematocrit 09/14/2020 42.1    • MCV 09/14/2020 91.7    • MCH 09/14/2020 30.3    • MCHC 09/14/2020 33.0*   • RDW 09/14/2020 44.3    • Platelet Count 09/14/2020 297    • MPV 09/14/2020 10.4    • Neutrophils-Polys 09/14/2020 72.90*   • Lymphocytes 09/14/2020 13.40*   • Monocytes 09/14/2020 8.00    • Eosinophils 09/14/2020 3.10    • Basophils 09/14/2020 0.30    • Immature Granulocytes 09/14/2020 2.30*   • Nucleated RBC 09/14/2020 0.00    • Neutrophils (Absolute) 09/14/2020 5.51    • Lymphs (Absolute) 09/14/2020 1.01    • Monos (Absolute) 09/14/2020 0.60    • Eos (Absolute) 09/14/2020 0.23    • Baso (Absolute) 09/14/2020 0.02    • Immature Granulocytes (a* 09/14/2020 0.17*   • NRBC (Absolute) 09/14/2020 0.00    • Glycohemoglobin  09/14/2020 6.4*   • Est Avg Glucose 09/14/2020 137    • Fasting Status 09/14/2020 Fasting    • Significant Indicator 09/14/2020 NEG    • Source 09/14/2020 UR    • Site 09/14/2020 -    • Culture Result 09/14/2020 Mixed skin pat 10-50,000 cfu/mL    Office Visit on 09/14/2020   Component Date Value   • POC Color 09/14/2020 dark yellow    • POC Appearance 09/14/2020 clear    • POC Leukocyte Esterase 09/14/2020 trace    • POC Nitrites 09/14/2020 negative    • POC Urobiligen 09/14/2020 0.2    • POC Protein 09/14/2020 30    • POC Urine PH 09/14/2020 5.5    • POC Blood 09/14/2020 trace    • POC Specific Gravity 09/14/2020 1.025    • POC Ketones 09/14/2020 negative    • POC Bilirubin 09/14/2020 small    • POC Glucose 09/14/2020 negative    Hospital Outpatient Visit on 08/05/2020   Component Date Value   • Significant Indicator 08/05/2020 NEG    • Source 08/05/2020 UR    • Site 08/05/2020 -    • Culture Result 08/05/2020 Mixed skin pat 10-50,000 cfu/mL    Office Visit on 08/05/2020   Component Date Value   • POC Color 08/05/2020 Divya    • POC Appearance 08/05/2020 Cloudy    • POC Leukocyte Esterase 08/05/2020 Trace    • POC Nitrites 08/05/2020 Negative    • POC Urobiligen 08/05/2020 Negative    • POC Protein 08/05/2020 Negative    • POC Urine PH 08/05/2020 5.0    • POC Blood 08/05/2020 Negative    • POC Specific Gravity 08/05/2020 1.030    • POC Ketones 08/05/2020 Negative    • POC Bilirubin 08/05/2020 Negative    • POC Glucose 08/05/2020 Negative    Hospital Outpatient Visit on 04/22/2020   Component Date Value   • Significant Indicator 04/22/2020 NEG    • Source 04/22/2020 UR    • Site 04/22/2020 -    • Culture Result 04/22/2020 Mixed skin pta 10-50,000 cfu/mL    Office Visit on 04/22/2020   Component Date Value   • POC Color 04/22/2020 Yellow    • POC Appearance 04/22/2020 Clear    • POC Leukocyte Esterase 04/22/2020 Negative    • POC Nitrites 04/22/2020 Negative    • POC Urobiligen 04/22/2020 Negative    • POC  Protein 04/22/2020 Trace    • POC Urine PH 04/22/2020 5.5    • POC Blood 04/22/2020 Negative    • POC Specific Gravity 04/22/2020 1.025    • POC Ketones 04/22/2020 Negative    • POC Bilirubin 04/22/2020 Negative    • POC Glucose 04/22/2020 Negative    ]

## 2021-01-04 DIAGNOSIS — N39.45 URINARY INCONTINENCE WITH CONTINUOUS LEAKAGE: ICD-10-CM

## 2021-01-04 NOTE — TELEPHONE ENCOUNTER
Was the patient seen in the last year in this department? Yes    Does patient have an active prescription for medications requested? Yes    Received Request Via: Patient    Hospital Outpatient Visit on 09/14/2020   Component Date Value   • Iron 09/14/2020 83    • Total Iron Binding 09/14/2020 259    • Unsat Iron Binding 09/14/2020 176    • % Saturation 09/14/2020 32    • 25-Hydroxy   Vitamin D 25 09/14/2020 38    • Cholesterol,Tot 09/14/2020 178    • Triglycerides 09/14/2020 150*   • HDL 09/14/2020 34*   • LDL 09/14/2020 114*   • GFR If  09/14/2020 >60    • GFR If Non  [employer]i* 09/14/2020 59*   • Sodium 09/14/2020 139    • Potassium 09/14/2020 3.0*   • Chloride 09/14/2020 97    • Co2 09/14/2020 26    • Anion Gap 09/14/2020 16.0    • Glucose 09/14/2020 116*   • Bun 09/14/2020 16    • Creatinine 09/14/2020 0.93    • Calcium 09/14/2020 8.8    • AST(SGOT) 09/14/2020 12    • ALT(SGPT) 09/14/2020 11    • Alkaline Phosphatase 09/14/2020 122*   • Total Bilirubin 09/14/2020 0.5    • Albumin 09/14/2020 3.8    • Total Protein 09/14/2020 6.6    • Globulin 09/14/2020 2.8    • A-G Ratio 09/14/2020 1.4    • WBC 09/14/2020 7.5    • RBC 09/14/2020 4.59    • Hemoglobin 09/14/2020 13.9    • Hematocrit 09/14/2020 42.1    • MCV 09/14/2020 91.7    • MCH 09/14/2020 30.3    • MCHC 09/14/2020 33.0*   • RDW 09/14/2020 44.3    • Platelet Count 09/14/2020 297    • MPV 09/14/2020 10.4    • Neutrophils-Polys 09/14/2020 72.90*   • Lymphocytes 09/14/2020 13.40*   • Monocytes 09/14/2020 8.00    • Eosinophils 09/14/2020 3.10    • Basophils 09/14/2020 0.30    • Immature Granulocytes 09/14/2020 2.30*   • Nucleated RBC 09/14/2020 0.00    • Neutrophils (Absolute) 09/14/2020 5.51    • Lymphs (Absolute) 09/14/2020 1.01    • Monos (Absolute) 09/14/2020 0.60    • Eos (Absolute) 09/14/2020 0.23    • Baso (Absolute) 09/14/2020 0.02    • Immature Granulocytes (a* 09/14/2020 0.17*   • NRBC (Absolute) 09/14/2020 0.00    • Glycohemoglobin  09/14/2020 6.4*   • Est Avg Glucose 09/14/2020 137    • Fasting Status 09/14/2020 Fasting    • Significant Indicator 09/14/2020 NEG    • Source 09/14/2020 UR    • Site 09/14/2020 -    • Culture Result 09/14/2020 Mixed skin pat 10-50,000 cfu/mL    Office Visit on 09/14/2020   Component Date Value   • POC Color 09/14/2020 dark yellow    • POC Appearance 09/14/2020 clear    • POC Leukocyte Esterase 09/14/2020 trace    • POC Nitrites 09/14/2020 negative    • POC Urobiligen 09/14/2020 0.2    • POC Protein 09/14/2020 30    • POC Urine PH 09/14/2020 5.5    • POC Blood 09/14/2020 trace    • POC Specific Gravity 09/14/2020 1.025    • POC Ketones 09/14/2020 negative    • POC Bilirubin 09/14/2020 small    • POC Glucose 09/14/2020 negative    Hospital Outpatient Visit on 08/05/2020   Component Date Value   • Significant Indicator 08/05/2020 NEG    • Source 08/05/2020 UR    • Site 08/05/2020 -    • Culture Result 08/05/2020 Mixed skin pat 10-50,000 cfu/mL    Office Visit on 08/05/2020   Component Date Value   • POC Color 08/05/2020 Divya    • POC Appearance 08/05/2020 Cloudy    • POC Leukocyte Esterase 08/05/2020 Trace    • POC Nitrites 08/05/2020 Negative    • POC Urobiligen 08/05/2020 Negative    • POC Protein 08/05/2020 Negative    • POC Urine PH 08/05/2020 5.0    • POC Blood 08/05/2020 Negative    • POC Specific Gravity 08/05/2020 1.030    • POC Ketones 08/05/2020 Negative    • POC Bilirubin 08/05/2020 Negative    • POC Glucose 08/05/2020 Negative    Hospital Outpatient Visit on 04/22/2020   Component Date Value   • Significant Indicator 04/22/2020 NEG    • Source 04/22/2020 UR    • Site 04/22/2020 -    • Culture Result 04/22/2020 Mixed skin pat 10-50,000 cfu/mL    Office Visit on 04/22/2020   Component Date Value   • POC Color 04/22/2020 Yellow    • POC Appearance 04/22/2020 Clear    • POC Leukocyte Esterase 04/22/2020 Negative    • POC Nitrites 04/22/2020 Negative    • POC Urobiligen 04/22/2020 Negative    • POC  Protein 04/22/2020 Trace    • POC Urine PH 04/22/2020 5.5    • POC Blood 04/22/2020 Negative    • POC Specific Gravity 04/22/2020 1.025    • POC Ketones 04/22/2020 Negative    • POC Bilirubin 04/22/2020 Negative    • POC Glucose 04/22/2020 Negative    ]

## 2021-01-05 RX ORDER — OXYBUTYNIN CHLORIDE 5 MG/1
TABLET ORAL
Qty: 180 TAB | Refills: 0 | Status: SHIPPED | OUTPATIENT
Start: 2021-01-05 | End: 2021-03-02

## 2021-03-02 ENCOUNTER — OFFICE VISIT (OUTPATIENT)
Dept: MEDICAL GROUP | Facility: CLINIC | Age: 71
End: 2021-03-02
Payer: MEDICARE

## 2021-03-02 VITALS
HEIGHT: 65 IN | DIASTOLIC BLOOD PRESSURE: 76 MMHG | TEMPERATURE: 98.4 F | WEIGHT: 171 LBS | SYSTOLIC BLOOD PRESSURE: 110 MMHG | BODY MASS INDEX: 28.49 KG/M2 | HEART RATE: 77 BPM | OXYGEN SATURATION: 96 % | RESPIRATION RATE: 16 BRPM

## 2021-03-02 DIAGNOSIS — N39.45 URINARY INCONTINENCE WITH CONTINUOUS LEAKAGE: ICD-10-CM

## 2021-03-02 DIAGNOSIS — Z86.010 HISTORY OF COLON POLYPS: ICD-10-CM

## 2021-03-02 DIAGNOSIS — K64.9 HEMORRHOIDS, UNSPECIFIED HEMORRHOID TYPE: ICD-10-CM

## 2021-03-02 DIAGNOSIS — K21.00 GASTROESOPHAGEAL REFLUX DISEASE WITH ESOPHAGITIS WITHOUT HEMORRHAGE: ICD-10-CM

## 2021-03-02 PROCEDURE — 99213 OFFICE O/P EST LOW 20 MIN: CPT | Performed by: PHYSICIAN ASSISTANT

## 2021-03-02 RX ORDER — OXYBUTYNIN CHLORIDE 15 MG/1
15 TABLET, EXTENDED RELEASE ORAL DAILY
Qty: 90 TABLET | Refills: 3 | Status: SHIPPED | OUTPATIENT
Start: 2021-03-02 | End: 2021-04-30 | Stop reason: SDUPTHER

## 2021-03-02 ASSESSMENT — FIBROSIS 4 INDEX: FIB4 SCORE: 0.88

## 2021-03-02 NOTE — PROGRESS NOTES
Chief Complaint   Patient presents with   • GI Problem     ache not burning        HISTORY OF PRESENT ILLNESS: Patient is a 70 y.o. female established patient who presents today to discuss the following issues:    History of colon polyps  Last colonoscopy 5 years ago with polyp removal. Now having problems with Bowel movements being very thin, grooved, and blood frequently.  Patient also has a history of hemorrhoids.  Takes senna to help have bowel movements. Also having sharp, mid-epigastric pain that will radiate to back.  Happens intermittently, usually after eating, sometime relieved by milk. Currently taking prilosec 40 mg daily without relief.  Patient has not been seen by GI for the last 5 years.  Referral has been placed today for reevaluation.  We will follow-up after her appointment with GI.    Gastroesophageal reflux disease with esophagitis without hemorrhage  Patient states she is having sharp mid-epigastric pain that is radiating to her back and is occasionally relieved by drinking milk but not always. Patient currently on omeprazole 20 mg daily without relief. Referral placed to GI for another medical issue and I have requested that they do further evaluation on this problem as well. We will follow up with her in 3 months after evaluation by GI, sooner if needed.      Patient Active Problem List    Diagnosis Date Noted   • Intermittent claudication of both lower extremities due to atherosclerosis (HCC) 10/28/2020   • Occlusion and stenosis of bilateral carotid arteries 10/28/2020   • Epigastric abdominal pain 10/28/2020   • Bloody stool 09/08/2020   • Chronic nausea 09/08/2020   • Fibromyalgia 09/03/2020   • Abdominal aortic aneurysm without rupture (HCC) 08/05/2020   • Baker's cyst 08/05/2020   • Stage 3 chronic kidney disease 08/05/2020   • Dyslipidemia 08/05/2020   • Hemorrhoids 08/05/2020   • Hip pain, chronic 08/05/2020   • Hypomagnesemia 08/05/2020   • Iron deficiency anemia 08/05/2020   •  Personal history of other infectious and parasitic diseases 08/05/2020   • Atherosclerosis of aorta (HCC) 05/29/2020   • Pain in lower jaw 05/29/2020   • Prediabetes 11/15/2019   • Iliotibial band syndrome of left side 11/15/2019   • Drug-induced constipation 11/15/2019   • Pain in joint involving ankle and foot 08/07/2019   • Weakness 08/07/2019   • Preventative health care 07/10/2019   • Balance problem 06/07/2019   • Dizziness 06/07/2019   • 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   • Overactive bladder 03/22/2019   • Neck pain 03/22/2019   • History of cancer of vulva 07/19/2018   • History of colon polyps 07/19/2018   • Varicose veins of both lower extremities 07/19/2018   • Insomnia 07/21/2015   • 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 90 tablet 3   • sertraline (ZOLOFT) 100 MG Tab TAKE 2 TABLETS BY MOUTH EVERY DAY (Patient taking differently: Take 50 mg by mouth every day.) 180 Tab 3   • Diclofenac Sodium 1 % Gel APPLY 4 GRAMS TO AFFECTED AREA UP TO THREE TIMES A DAY AS NEEDED FOR PAIN     • omeprazole (PRILOSEC) 20 MG delayed-release capsule Take 2 Caps by mouth every day. 180 Cap 3   • sennosides (SENOKOT) 8.6 MG Tab SENOKOT 8.6 MG TABS     • losartan (COZAAR) 50 MG Tab Take 1 Tab by mouth every day. 90 Tab 3   • traZODone (DESYREL) 100 MG Tab Take 1 Tab by mouth every bedtime. 90 Tab 3   • oxycodone-acetaminophen (PERCOCET-10)  MG Tab Take 1-2 Tabs by mouth every four hours as needed for Severe Pain.       No current  facility-administered medications for this visit.       Hospital Outpatient Visit on 09/14/2020   Component Date Value Ref Range Status   • Iron 09/14/2020 83  40 - 170 ug/dL Final   • Total Iron Binding 09/14/2020 259  250 - 450 ug/dL Final   • Unsat Iron Binding 09/14/2020 176  110 - 370 ug/dL Final   • % Saturation 09/14/2020 32  15 - 55 % Final   • 25-Hydroxy   Vitamin D 25 09/14/2020 38  30 - 100 ng/mL Final    Comment: Adult Ranges:   <20 ng/mL - Deficiency  20-29 ng/mL - Insufficiency   ng/mL - Sufficiency  Effective 3/18/2020, this electrochemiluminescence binding assay is  performed using Roche ghazal e immunoassay analyzer.  The [employer]ys Vitamin D  total II assay is intended for the quantitative determination of total 25  hydroxyvitamin D in human serum and plasma. This assay is to be used as an  aid in the assessment of vitamin D sufficiency in adults.     • Cholesterol,Tot 09/14/2020 178  100 - 199 mg/dL Final   • Triglycerides 09/14/2020 150* 0 - 149 mg/dL Final   • HDL 09/14/2020 34* >=40 mg/dL Final   • LDL 09/14/2020 114* <100 mg/dL Final   • GFR If  09/14/2020 >60  >60 mL/min/1.73 m 2 Final   • GFR If Non  09/14/2020 59* >60 mL/min/1.73 m 2 Final   • Sodium 09/14/2020 139  135 - 145 mmol/L Final   • Potassium 09/14/2020 3.0* 3.6 - 5.5 mmol/L Final   • Chloride 09/14/2020 97  96 - 112 mmol/L Final   • Co2 09/14/2020 26  20 - 33 mmol/L Final   • Anion Gap 09/14/2020 16.0  7.0 - 16.0 Final   • Glucose 09/14/2020 116* 65 - 99 mg/dL Final   • Bun 09/14/2020 16  8 - 22 mg/dL Final   • Creatinine 09/14/2020 0.93  0.50 - 1.40 mg/dL Final   • Calcium 09/14/2020 8.8  8.5 - 10.5 mg/dL Final   • AST(SGOT) 09/14/2020 12  12 - 45 U/L Final   • ALT(SGPT) 09/14/2020 11  2 - 50 U/L Final   • Alkaline Phosphatase 09/14/2020 122* 30 - 99 U/L Final   • Total Bilirubin 09/14/2020 0.5  0.1 - 1.5 mg/dL Final   • Albumin 09/14/2020 3.8  3.2 - 4.9 g/dL Final   • Total Protein 09/14/2020  6.6  6.0 - 8.2 g/dL Final   • Globulin 09/14/2020 2.8  1.9 - 3.5 g/dL Final   • A-G Ratio 09/14/2020 1.4  g/dL Final   • WBC 09/14/2020 7.5  4.8 - 10.8 K/uL Final   • RBC 09/14/2020 4.59  4.20 - 5.40 M/uL Final   • Hemoglobin 09/14/2020 13.9  12.0 - 16.0 g/dL Final   • Hematocrit 09/14/2020 42.1  37.0 - 47.0 % Final   • MCV 09/14/2020 91.7  81.4 - 97.8 fL Final   • MCH 09/14/2020 30.3  27.0 - 33.0 pg Final   • MCHC 09/14/2020 33.0* 33.6 - 35.0 g/dL Final   • RDW 09/14/2020 44.3  35.9 - 50.0 fL Final   • Platelet Count 09/14/2020 297  164 - 446 K/uL Final   • MPV 09/14/2020 10.4  9.0 - 12.9 fL Final   • Neutrophils-Polys 09/14/2020 72.90* 44.00 - 72.00 % Final   • Lymphocytes 09/14/2020 13.40* 22.00 - 41.00 % Final   • Monocytes 09/14/2020 8.00  0.00 - 13.40 % Final   • Eosinophils 09/14/2020 3.10  0.00 - 6.90 % Final   • Basophils 09/14/2020 0.30  0.00 - 1.80 % Final   • Immature Granulocytes 09/14/2020 2.30* 0.00 - 0.90 % Final   • Nucleated RBC 09/14/2020 0.00  /100 WBC Final   • Neutrophils (Absolute) 09/14/2020 5.51  2.00 - 7.15 K/uL Final    Includes immature neutrophils, if present.   • Lymphs (Absolute) 09/14/2020 1.01  1.00 - 4.80 K/uL Final   • Monos (Absolute) 09/14/2020 0.60  0.00 - 0.85 K/uL Final   • Eos (Absolute) 09/14/2020 0.23  0.00 - 0.51 K/uL Final   • Baso (Absolute) 09/14/2020 0.02  0.00 - 0.12 K/uL Final   • Immature Granulocytes (abs) 09/14/2020 0.17* 0.00 - 0.11 K/uL Final   • NRBC (Absolute) 09/14/2020 0.00  K/uL Final   • Glycohemoglobin 09/14/2020 6.4* 0.0 - 5.6 % Final    Comment: Increased risk for diabetes:  5.7 -6.4%  Diabetes:  >6.4%  Glycemic control for adults with diabetes:  <7.0%  The above interpretations are per ADA guidelines.  Diagnosis  of diabetes mellitus on the basis of elevated Hemoglobin A1c  should be confirmed by repeating the Hb A1c test.     • Est Avg Glucose 09/14/2020 137  mg/dL Final    Comment: The eAG calculation is based on the A1c-Derived Daily Glucose  (ADAG)  study.  See the ADA's website for additional information.     • Fasting Status 2020 Fasting   Final   • Significant Indicator 2020 NEG   Final   • Source 2020 UR   Final   • Site 2020 -   Final   • Culture Result 2020 Mixed skin pat 10-50,000 cfu/mL   Final   Office Visit on 2020   Component Date Value Ref Range Status   • POC Color 2020 dark yellow  Negative Final   • POC Appearance 2020 clear  Negative Final   • POC Leukocyte Esterase 2020 trace  Negative Final   • POC Nitrites 2020 negative  Negative Final   • POC Urobiligen 2020 0.2  Negative (0.2) mg/dL Final   • POC Protein 2020 30  Negative mg/dL Final   • POC Urine PH 2020 5.5  5.0 - 8.0 Final   • POC Blood 2020 trace  Negative Final   • POC Specific Gravity 2020 1.025  <1.005 - >1.030 Final   • POC Ketones 2020 negative  Negative mg/dL Final   • POC Bilirubin 2020 small  Negative mg/dL Final   • POC Glucose 2020 negative  Negative mg/dL Final   ]    The ASCVD Risk score (Faheem LAUREN Jr., et al., 2013) failed to calculate for the following reasons:    The patient has a prior MI or stroke diagnosis    Social History     Tobacco Use   • Smoking status: Never Smoker   • Smokeless tobacco: Never Used   Substance Use Topics   • Alcohol use: No   • Drug use: No       Family Status   Relation Name Status   • Mo     • Fa     • Sis  Alive     Family History   Problem Relation Age of Onset   • Cancer Mother    • Heart Disease Father        Patient's last menstrual period was 10/01/1990.    Health Maintenance Summary                COVID-19 Vaccine Overdue 10/3/1964     IMM ZOSTER VACCINES Overdue 10/3/1998     MAMMOGRAM Overdue 2015      Previously completed 2014      Patient has more history with this topic...    Annual Wellness Visit Overdue 9/10/2015      Done 2014     BONE DENSITY Overdue 2019      Previously completed 2014   "   PAP SMEAR Overdue 6/27/2020      Done 6/27/2017 PATHOLOGY GYN SPECIMEN     Patient has more history with this topic...    COLONOSCOPY Postponed 1/1/2025 Originally 9/9/2017. Patient Refused     Previously completed 9/9/2007     IMM DTaP/Tdap/Td Vaccine Next Due 3/2/2022      Done 3/2/2012 Imm Admin: Tdap Vaccine           Review of Systems:   Constitutional: Negative for fever, chills, weight change, fatigue, loss of appetite.  HNT: Negative for nosebleeds, congestion, odynophagia, sore throat or changes in taste.    Eyes: Negative for vision changes.   Ears: Negative for recent changes in hearing, pain or discharge.  Neck: Negative for pain, swelling, lumps or goiter.  Respiratory: Negative for cough, sputum production, shortness of breath and wheezing.    Cardiovascular: Negative for chest pain, palpitations, orthopnea and leg swelling.   Gastrointestinal: Positive for drug induced constipation, changes in caliber of stool, mid-epigastric abdominal pain. Negative for diarrhea, heartburn, dysphagia, nausea, vomiting.   Genitourinary: Negative for dysuria, urgency and frequency.   Musculoskeletal: Negative for myalgias, joint pain, and back pain.  Skin: Negative for skin, hair or nail changes, rash, itching.   Neurological: Negative for dizziness, tingling, tremors, sensory change, gait/coordination changes, focal weakness and headaches.   Endo/Heme/Allergies: Does not bruise/bleed easily.   Psychiatric/Behavioral: Negative for depression, suicidal ideas and memory loss.  The patient is not nervous/anxious and does not have insomnia.        Exam:  /76 (BP Location: Left arm, Patient Position: Sitting, BP Cuff Size: Adult)   Pulse 77   Temp 36.9 °C (98.4 °F) (Temporal)   Resp 16   Ht 1.651 m (5' 5\")   Wt 77.6 kg (171 lb)   SpO2 96%  Body mass index is 28.46 kg/m².  General:  Well nourished, well developed female. No apparent distress.  Eyes: EOM intact, PERRL, conjunctiva non-injected, sclera " non-icteric.  Ears: Roselyn pinnae, external auditory canals, TM pearly gray with normal light reflex bilaterally.  Neck: Supple with no cervical lymphadenopathy, JVD, palpable thyroid nodules or carotid bruits.  Pulmonary: Clear to ausculation bilaterally. Normal effort. No rales, ronchi, or wheezing.  Cardiovascular: Regular rate and rhythm without murmur, rub or gallop.   Abdomen:  Soft, non-distended, mildly tender to palpation over epigastric area, negative Mckinley's sign with normal bowel sounds. No CVA tenderness  Extremities: Full range of motion. Warm and well perfused with no edema.  Skin: Intact with no obvious rashes or lesions.  Neuro: Cranial nerves I-XII intact.  Psych: Alert and oriented x 3.  Appropriately dressed. Mood and affect appropriate.      Assessment/Plan:  1. Urinary incontinence with continuous leakage  oxybutynin (DITROPAN XL) 15 MG CR tablet    Controlled. Continue current medication which was refilled today. Followup in 6 months.   2. Gastroesophageal reflux disease with esophagitis without hemorrhage  REFERRAL TO GASTROENTEROLOGY   3. History of colon polyps  REFERRAL TO GASTROENTEROLOGY   4. Hemorrhoids, unspecified hemorrhoid type  REFERRAL TO GASTROENTEROLOGY       Reviewed risks and benefits of treatment plan. Patient verbally agrees to plan of care.     Return in about 3 months (around 6/2/2021).    Please note that this dictation was created using voice recognition software. I have made every reasonable attempt to correct obvious errors, but I expect that there are errors of grammar and possibly content that I did not discover before finalizing the note.

## 2021-03-19 NOTE — ASSESSMENT & PLAN NOTE
Last colonoscopy 5 years ago with polyp removal. Now having problems with Bowel movements being very thin, grooved, and blood frequently.  Patient also has a history of hemorrhoids.  Takes senna to help have bowel movements. Also having sharp, mid-epigastric pain that will radiate to back.  Happens intermittently, usually after eating, sometime relieved by milk. Currently taking prilosec 40 mg daily without relief.  Patient has not been seen by GI for the last 5 years.  Referral has been placed today for reevaluation.  We will follow-up after her appointment with GI.

## 2021-04-13 NOTE — ASSESSMENT & PLAN NOTE
Patient states she is having sharp mid-epigastric pain that is radiating to her back and is occasionally relieved by drinking milk but not always. Patient currently on omeprazole 20 mg daily without relief. Referral placed to GI for another medical issue and I have requested that they do further evaluation on this problem as well. We will follow up with her in 3 months after evaluation by GI, sooner if needed.

## 2021-04-30 DIAGNOSIS — N39.45 URINARY INCONTINENCE WITH CONTINUOUS LEAKAGE: ICD-10-CM

## 2021-04-30 RX ORDER — OXYBUTYNIN CHLORIDE 15 MG/1
15 TABLET, EXTENDED RELEASE ORAL DAILY
Qty: 90 TABLET | Refills: 1 | Status: SHIPPED | OUTPATIENT
Start: 2021-04-30 | End: 2021-11-17 | Stop reason: SDUPTHER

## 2021-04-30 NOTE — TELEPHONE ENCOUNTER
Was the patient seen in the last year in this department? Yes    Does patient have an active prescription for medications requested? Yes    Received Request Via: Pharmacy    Hospital Outpatient Visit on 09/14/2020   Component Date Value   • Iron 09/14/2020 83    • Total Iron Binding 09/14/2020 259    • Unsat Iron Binding 09/14/2020 176    • % Saturation 09/14/2020 32    • 25-Hydroxy   Vitamin D 25 09/14/2020 38    • Cholesterol,Tot 09/14/2020 178    • Triglycerides 09/14/2020 150*   • HDL 09/14/2020 34*   • LDL 09/14/2020 114*   • GFR If  09/14/2020 >60    • GFR If Non  [employer]* 09/14/2020 59*   • Sodium 09/14/2020 139    • Potassium 09/14/2020 3.0*   • Chloride 09/14/2020 97    • Co2 09/14/2020 26    • Anion Gap 09/14/2020 16.0    • Glucose 09/14/2020 116*   • Bun 09/14/2020 16    • Creatinine 09/14/2020 0.93    • Calcium 09/14/2020 8.8    • AST(SGOT) 09/14/2020 12    • ALT(SGPT) 09/14/2020 11    • Alkaline Phosphatase 09/14/2020 122*   • Total Bilirubin 09/14/2020 0.5    • Albumin 09/14/2020 3.8    • Total Protein 09/14/2020 6.6    • Globulin 09/14/2020 2.8    • A-G Ratio 09/14/2020 1.4    • WBC 09/14/2020 7.5    • RBC 09/14/2020 4.59    • Hemoglobin 09/14/2020 13.9    • Hematocrit 09/14/2020 42.1    • MCV 09/14/2020 91.7    • MCH 09/14/2020 30.3    • MCHC 09/14/2020 33.0*   • RDW 09/14/2020 44.3    • Platelet Count 09/14/2020 297    • MPV 09/14/2020 10.4    • Neutrophils-Polys 09/14/2020 72.90*   • Lymphocytes 09/14/2020 13.40*   • Monocytes 09/14/2020 8.00    • Eosinophils 09/14/2020 3.10    • Basophils 09/14/2020 0.30    • Immature Granulocytes 09/14/2020 2.30*   • Nucleated RBC 09/14/2020 0.00    • Neutrophils (Absolute) 09/14/2020 5.51    • Lymphs (Absolute) 09/14/2020 1.01    • Monos (Absolute) 09/14/2020 0.60    • Eos (Absolute) 09/14/2020 0.23    • Baso (Absolute) 09/14/2020 0.02    • Immature Granulocytes (a* 09/14/2020 0.17*   • NRBC (Absolute) 09/14/2020 0.00    • Glycohemoglobin  09/14/2020 6.4*   • Est Avg Glucose 09/14/2020 137    • Fasting Status 09/14/2020 Fasting    • Significant Indicator 09/14/2020 NEG    • Source 09/14/2020 UR    • Site 09/14/2020 -    • Culture Result 09/14/2020 Mixed skin pat 10-50,000 cfu/mL    Office Visit on 09/14/2020   Component Date Value   • POC Color 09/14/2020 dark yellow    • POC Appearance 09/14/2020 clear    • POC Leukocyte Esterase 09/14/2020 trace    • POC Nitrites 09/14/2020 negative    • POC Urobiligen 09/14/2020 0.2    • POC Protein 09/14/2020 30    • POC Urine PH 09/14/2020 5.5    • POC Blood 09/14/2020 trace    • POC Specific Gravity 09/14/2020 1.025    • POC Ketones 09/14/2020 negative    • POC Bilirubin 09/14/2020 small    • POC Glucose 09/14/2020 negative    Hospital Outpatient Visit on 08/05/2020   Component Date Value   • Significant Indicator 08/05/2020 NEG    • Source 08/05/2020 UR    • Site 08/05/2020 -    • Culture Result 08/05/2020 Mixed skin pat 10-50,000 cfu/mL    Office Visit on 08/05/2020   Component Date Value   • POC Color 08/05/2020 Divya    • POC Appearance 08/05/2020 Cloudy    • POC Leukocyte Esterase 08/05/2020 Trace    • POC Nitrites 08/05/2020 Negative    • POC Urobiligen 08/05/2020 Negative    • POC Protein 08/05/2020 Negative    • POC Urine PH 08/05/2020 5.0    • POC Blood 08/05/2020 Negative    • POC Specific Gravity 08/05/2020 1.030    • POC Ketones 08/05/2020 Negative    • POC Bilirubin 08/05/2020 Negative    • POC Glucose 08/05/2020 Negative    ]

## 2021-06-02 ENCOUNTER — OFFICE VISIT (OUTPATIENT)
Dept: MEDICAL GROUP | Facility: CLINIC | Age: 71
End: 2021-06-02
Payer: MEDICARE

## 2021-06-02 VITALS
WEIGHT: 172 LBS | OXYGEN SATURATION: 93 % | TEMPERATURE: 98.2 F | HEIGHT: 65 IN | BODY MASS INDEX: 28.66 KG/M2 | DIASTOLIC BLOOD PRESSURE: 74 MMHG | HEART RATE: 83 BPM | SYSTOLIC BLOOD PRESSURE: 126 MMHG

## 2021-06-02 DIAGNOSIS — R82.90 ABNORMAL URINE ODOR: ICD-10-CM

## 2021-06-02 DIAGNOSIS — F51.01 PRIMARY INSOMNIA: ICD-10-CM

## 2021-06-02 DIAGNOSIS — L98.9 FACIAL SKIN LESION: ICD-10-CM

## 2021-06-02 DIAGNOSIS — F33.41 RECURRENT MAJOR DEPRESSIVE DISORDER, IN PARTIAL REMISSION (HCC): ICD-10-CM

## 2021-06-02 LAB
APPEARANCE UR: NORMAL
BILIRUB UR STRIP-MCNC: NORMAL MG/DL
COLOR UR AUTO: NORMAL
GLUCOSE UR STRIP.AUTO-MCNC: NORMAL MG/DL
KETONES UR STRIP.AUTO-MCNC: NORMAL MG/DL
LEUKOCYTE ESTERASE UR QL STRIP.AUTO: NORMAL
NITRITE UR QL STRIP.AUTO: NORMAL
PH UR STRIP.AUTO: 5.5 [PH] (ref 5–8)
PROT UR QL STRIP: NORMAL MG/DL
RBC UR QL AUTO: NORMAL
SP GR UR STRIP.AUTO: 1.02
UROBILINOGEN UR STRIP-MCNC: NORMAL MG/DL

## 2021-06-02 PROCEDURE — 81002 URINALYSIS NONAUTO W/O SCOPE: CPT | Performed by: PHYSICIAN ASSISTANT

## 2021-06-02 PROCEDURE — 99214 OFFICE O/P EST MOD 30 MIN: CPT | Performed by: PHYSICIAN ASSISTANT

## 2021-06-02 RX ORDER — NITROFURANTOIN 25; 75 MG/1; MG/1
100 CAPSULE ORAL 2 TIMES DAILY
Qty: 14 CAPSULE | Refills: 0 | Status: SHIPPED | OUTPATIENT
Start: 2021-06-02 | End: 2021-11-17

## 2021-06-02 ASSESSMENT — FIBROSIS 4 INDEX: FIB4 SCORE: 0.88

## 2021-06-02 NOTE — PROGRESS NOTES
Chief Complaint   Patient presents with   • Vaginal Discharge     and odor x 3 weeks    • Medication Refill     sertraline and trazodone        HISTORY OF PRESENT ILLNESS: Patient is a 70 y.o. female established patient who presents today to discuss the following issues:    Facial skin lesion  Starts with a small bump that gets larger and painful. She picks at them and they get infected. Will start on     Abnormal urine odor  States has been consistent for the last 3 weeks. UA shown trace blood and leuk esterase. Will start her on nitrofurantoin for 7 days. She will return if symptoms do not resolve or worsen.    Depression  The patient's chronic condition is well controlled on the current therapy with no new symptoms or worsening. Currently on sertraline 50 mg daily. Requesting refills today.    Insomnia  The patient's chronic condition is well controlled on the current therapy with no new symptoms or worsening. Currently on trazodone 100 mg nightly. Controlling her symptoms well. Requesting refills today.       Patient Active Problem List    Diagnosis Date Noted   • Abnormal urine odor 06/28/2021   • Facial skin lesion 06/02/2021   • Intermittent claudication of both lower extremities due to atherosclerosis ([facility]) 10/28/2020   • Occlusion and stenosis of bilateral carotid arteries 10/28/2020   • Epigastric abdominal pain 10/28/2020   • Bloody stool 09/08/2020   • Chronic nausea 09/08/2020   • Fibromyalgia 09/03/2020   • Abdominal aortic aneurysm without rupture (HCC) 08/05/2020   • Baker's cyst 08/05/2020   • Stage 3 chronic kidney disease 08/05/2020   • Dyslipidemia 08/05/2020   • Hemorrhoids 08/05/2020   • Hip pain, chronic 08/05/2020   • Hypomagnesemia 08/05/2020   • Iron deficiency anemia 08/05/2020   • Personal history of other infectious and parasitic diseases 08/05/2020   • Atherosclerosis of aorta (HCC) 05/29/2020   • Pain in lower jaw 05/29/2020   • Prediabetes 11/15/2019   • Iliotibial band syndrome of  left side 11/15/2019   • Drug-induced constipation 11/15/2019   • Pain in joint involving ankle and foot 08/07/2019   • Weakness 08/07/2019   • Preventative health care 07/10/2019   • Balance problem 06/07/2019   • Dizziness 06/07/2019   • 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   • Overactive bladder 03/22/2019   • Neck pain 03/22/2019   • History of cancer of vulva 07/19/2018   • History of colon polyps 07/19/2018   • Varicose veins of both lower extremities 07/19/2018   • Insomnia 07/21/2015   • 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   • Malignant neoplasm of vulva, unspecified (HCC) 03/06/2015   • Hypertension 09/09/2014   • Gastroesophageal reflux disease with esophagitis without hemorrhage 09/09/2014   • Chronic low back pain 09/09/2014   • Depression 07/09/2014   • Malignant neoplasm (HCC) 07/09/2014       Allergies:Doxycycline    Current Outpatient Medications   Medication Sig Dispense Refill   • nitrofurantoin (MACROBID) 100 MG Cap Take 1 capsule by mouth 2 times a day. 14 capsule 0   • oxybutynin (DITROPAN XL) 15 MG CR tablet Take 1 tablet by mouth every day. 90 tablet 1   • sertraline (ZOLOFT) 100 MG Tab TAKE 2 TABLETS BY MOUTH EVERY DAY (Patient taking differently: Take 50 mg by mouth every day.) 180 Tab 3   • Diclofenac Sodium 1 % Gel APPLY 4 GRAMS TO AFFECTED AREA UP TO THREE TIMES A DAY AS NEEDED FOR PAIN     • omeprazole (PRILOSEC) 20 MG delayed-release capsule Take 2 Caps by mouth every day. 180 Cap 3   • losartan (COZAAR) 50 MG Tab Take 1 Tab by mouth every day. 90 Tab 3   • traZODone (DESYREL) 100 MG Tab Take 1 Tab by mouth every bedtime. 90 Tab 3   • oxycodone-acetaminophen (PERCOCET-10)  MG Tab Take 1-2 Tabs by mouth every four hours as needed for Severe Pain.       No current facility-administered medications for this visit.       No visits with results within 6 Month(s) from this visit.   Latest known visit with results is:   Hospital Outpatient Visit on 09/14/2020    Component Date Value Ref Range Status   • Iron 09/14/2020 83  40 - 170 ug/dL Final   • Total Iron Binding 09/14/2020 259  250 - 450 ug/dL Final   • Unsat Iron Binding 09/14/2020 176  110 - 370 ug/dL Final   • % Saturation 09/14/2020 32  15 - 55 % Final   • 25-Hydroxy   Vitamin D 25 09/14/2020 38  30 - 100 ng/mL Final    Comment: Adult Ranges:   <20 ng/mL - Deficiency  20-29 ng/mL - Insufficiency   ng/mL - Sufficiency  Effective 3/18/2020, this electrochemiluminescence binding assay is  performed using Roche ghazal e immunoassay analyzer.  The [employer] Vitamin D  total II assay is intended for the quantitative determination of total 25  hydroxyvitamin D in human serum and plasma. This assay is to be used as an  aid in the assessment of vitamin D sufficiency in adults.     • Cholesterol,Tot 09/14/2020 178  100 - 199 mg/dL Final   • Triglycerides 09/14/2020 150* 0 - 149 mg/dL Final   • HDL 09/14/2020 34* >=40 mg/dL Final   • LDL 09/14/2020 114* <100 mg/dL Final   • GFR If  09/14/2020 >60  >60 mL/min/1.73 m 2 Final   • GFR If Non  09/14/2020 59* >60 mL/min/1.73 m 2 Final   • Sodium 09/14/2020 139  135 - 145 mmol/L Final   • Potassium 09/14/2020 3.0* 3.6 - 5.5 mmol/L Final   • Chloride 09/14/2020 97  96 - 112 mmol/L Final   • Co2 09/14/2020 26  20 - 33 mmol/L Final   • Anion Gap 09/14/2020 16.0  7.0 - 16.0 Final   • Glucose 09/14/2020 116* 65 - 99 mg/dL Final   • Bun 09/14/2020 16  8 - 22 mg/dL Final   • Creatinine 09/14/2020 0.93  0.50 - 1.40 mg/dL Final   • Calcium 09/14/2020 8.8  8.5 - 10.5 mg/dL Final   • AST(SGOT) 09/14/2020 12  12 - 45 U/L Final   • ALT(SGPT) 09/14/2020 11  2 - 50 U/L Final   • Alkaline Phosphatase 09/14/2020 122* 30 - 99 U/L Final   • Total Bilirubin 09/14/2020 0.5  0.1 - 1.5 mg/dL Final   • Albumin 09/14/2020 3.8  3.2 - 4.9 g/dL Final   • Total Protein 09/14/2020 6.6  6.0 - 8.2 g/dL Final   • Globulin 09/14/2020 2.8  1.9 - 3.5 g/dL Final   • A-G Ratio  09/14/2020 1.4  g/dL Final   • WBC 09/14/2020 7.5  4.8 - 10.8 K/uL Final   • RBC 09/14/2020 4.59  4.20 - 5.40 M/uL Final   • Hemoglobin 09/14/2020 13.9  12.0 - 16.0 g/dL Final   • Hematocrit 09/14/2020 42.1  37.0 - 47.0 % Final   • MCV 09/14/2020 91.7  81.4 - 97.8 fL Final   • MCH 09/14/2020 30.3  27.0 - 33.0 pg Final   • MCHC 09/14/2020 33.0* 33.6 - 35.0 g/dL Final   • RDW 09/14/2020 44.3  35.9 - 50.0 fL Final   • Platelet Count 09/14/2020 297  164 - 446 K/uL Final   • MPV 09/14/2020 10.4  9.0 - 12.9 fL Final   • Neutrophils-Polys 09/14/2020 72.90* 44.00 - 72.00 % Final   • Lymphocytes 09/14/2020 13.40* 22.00 - 41.00 % Final   • Monocytes 09/14/2020 8.00  0.00 - 13.40 % Final   • Eosinophils 09/14/2020 3.10  0.00 - 6.90 % Final   • Basophils 09/14/2020 0.30  0.00 - 1.80 % Final   • Immature Granulocytes 09/14/2020 2.30* 0.00 - 0.90 % Final   • Nucleated RBC 09/14/2020 0.00  /100 WBC Final   • Neutrophils (Absolute) 09/14/2020 5.51  2.00 - 7.15 K/uL Final    Includes immature neutrophils, if present.   • Lymphs (Absolute) 09/14/2020 1.01  1.00 - 4.80 K/uL Final   • Monos (Absolute) 09/14/2020 0.60  0.00 - 0.85 K/uL Final   • Eos (Absolute) 09/14/2020 0.23  0.00 - 0.51 K/uL Final   • Baso (Absolute) 09/14/2020 0.02  0.00 - 0.12 K/uL Final   • Immature Granulocytes (abs) 09/14/2020 0.17* 0.00 - 0.11 K/uL Final   • NRBC (Absolute) 09/14/2020 0.00  K/uL Final   • Glycohemoglobin 09/14/2020 6.4* 0.0 - 5.6 % Final    Comment: Increased risk for diabetes:  5.7 -6.4%  Diabetes:  >6.4%  Glycemic control for adults with diabetes:  <7.0%  The above interpretations are per ADA guidelines.  Diagnosis  of diabetes mellitus on the basis of elevated Hemoglobin A1c  should be confirmed by repeating the Hb A1c test.     • Est Avg Glucose 09/14/2020 137  mg/dL Final    Comment: The eAG calculation is based on the A1c-Derived Daily Glucose  (ADAG) study.  See the ADA's website for additional information.     • Fasting Status 09/14/2020  Fasting   Final   • Significant Indicator 2020 NEG   Final   • Source 2020 UR   Final   • Site 2020 -   Final   • Culture Result 2020 Mixed skin pat 10-50,000 cfu/mL   Final   ]    The ASCVD Risk score (Faheem AGUILAR Jr., et al., 2013) failed to calculate for the following reasons:    The patient has a prior MI or stroke diagnosis    Social History     Tobacco Use   • Smoking status: Never Smoker   • Smokeless tobacco: Never Used   Vaping Use   • Vaping Use: Never used   Substance Use Topics   • Alcohol use: No   • Drug use: No       Family Status   Relation Name Status   • Mo     • Fa     • Sis  Alive     Family History   Problem Relation Age of Onset   • Cancer Mother    • Heart Disease Father        Patient's last menstrual period was 10/01/1990.    Health Maintenance Summary                COVID-19 Vaccine Overdue 10/3/1960     IMM ZOSTER VACCINES Overdue 10/3/1998     MAMMOGRAM Overdue 2015      Previously completed 2014      Patient has more history with this topic...    Annual Wellness Visit Overdue 9/10/2015      Done 2014     BONE DENSITY Overdue 2019      Previously completed 2014     PAP SMEAR Overdue 2020      Done 2017 PATHOLOGY GYN SPECIMEN     Patient has more history with this topic...    COLONOSCOPY Postponed 2025 Originally 2017. Patient Refused     Previously completed 2007     IMM DTaP/Tdap/Td Vaccine Next Due 3/2/2022      Done 3/2/2012 Imm Admin: Tdap Vaccine           Review of Systems:   Constitutional: Negative for fever, chills, weight change, fatigue, loss of appetite.  HNT: Negative for nosebleeds, congestion, odynophagia, sore throat or changes in taste.    Eyes: Negative for vision changes.   Ears: Negative for recent changes in hearing, pain or discharge.  Neck: Negative for pain, swelling, lumps or goiter.  Respiratory: Negative for cough, sputum production, shortness of breath and wheezing.   "  Cardiovascular: Negative for chest pain, palpitations, orthopnea and leg swelling.   Gastrointestinal: Negative for constipation, diarrhea, heartburn, dysphagia, nausea, vomiting or abdominal pain.   Genitourinary: Negative for dysuria, urgency and frequency.   Musculoskeletal: Negative for myalgias, joint pain, and back pain.  Skin: Negative for skin, hair or nail changes, rash, itching.   Neurological: Negative for dizziness, tingling, tremors, sensory change, gait/coordination changes, focal weakness and headaches.   Endo/Heme/Allergies: Does not bruise/bleed easily.   Psychiatric/Behavioral: Positive for depression and insomnia. Negative for suicidal ideas and memory loss.  The patient is not nervous/anxious.        Exam:  /74 (BP Location: Left arm, Patient Position: Sitting, BP Cuff Size: Adult)   Pulse 83   Temp 36.8 °C (98.2 °F) (Temporal)   Ht 1.651 m (5' 5\")   Wt 78 kg (172 lb)   SpO2 93%  Body mass index is 28.62 kg/m².  General:  Well nourished, well developed female. Body mass index is 28.62 kg/m². No apparent distress.  Eyes: EOM intact, PERRL, conjunctiva non-injected, sclera non-icteric.  Neck: Supple with no cervical lymphadenopathy, JVD, palpable thyroid nodules or carotid bruits.  Pulmonary: Clear to ausculation bilaterally. Normal effort. No rales, ronchi, or wheezing.  Cardiovascular: Regular rate and rhythm without murmur, rub or gallop.   Extremities: Full range of motion. Warm and well perfused with no edema.  Skin: Intact with no obvious rashes or lesions.  Neuro: Cranial nerves I-XII intact.  Psych: Alert and oriented x 3.  Appropriately dressed. Mood and affect appropriate.      Assessment/Plan:  1. Facial skin lesion     2. Abnormal urine odor  POCT Urinalysis    nitrofurantoin (MACROBID) 100 MG Cap   3. Recurrent major depressive disorder, in partial remission (HCC)     4. Primary insomnia         Reviewed risks and benefits of treatment plan. Patient verbally agrees to plan " of care.     Return in about 3 months (around 9/2/2021) for f/u labs and meds.    Please note that this dictation was created using voice recognition software. I have made every reasonable attempt to correct obvious errors, but I expect that there are errors of grammar and possibly content that I did not discover before finalizing the note.

## 2021-06-02 NOTE — ASSESSMENT & PLAN NOTE
Starts with a small bump that gets larger and painful. She picks at them and they get infected. Will start on

## 2021-06-28 PROBLEM — R82.90 ABNORMAL URINE ODOR: Status: ACTIVE | Noted: 2021-06-28

## 2021-06-28 NOTE — ASSESSMENT & PLAN NOTE
The patient's chronic condition is well controlled on the current therapy with no new symptoms or worsening. Currently on sertraline 50 mg daily. Requesting refills today.

## 2021-06-28 NOTE — ASSESSMENT & PLAN NOTE
States has been consistent for the last 3 weeks. UA shown trace blood and leuk esterase. Will start her on nitrofurantoin for 7 days. She will return if symptoms do not resolve or worsen.

## 2021-06-28 NOTE — ASSESSMENT & PLAN NOTE
The patient's chronic condition is well controlled on the current therapy with no new symptoms or worsening. Currently on trazodone 100 mg nightly. Controlling her symptoms well. Requesting refills today.

## 2021-10-13 DIAGNOSIS — I10 ESSENTIAL HYPERTENSION: ICD-10-CM

## 2021-10-13 RX ORDER — AMLODIPINE BESYLATE 10 MG/1
1 TABLET ORAL DAILY
COMMUNITY
End: 2021-11-17

## 2021-10-13 RX ORDER — LOSARTAN POTASSIUM 50 MG/1
50 TABLET ORAL DAILY
Qty: 90 TABLET | Refills: 3 | Status: SHIPPED | OUTPATIENT
Start: 2021-10-13 | End: 2022-01-19 | Stop reason: SDUPTHER

## 2021-10-13 NOTE — TELEPHONE ENCOUNTER
Was the patient seen in the last year in this department? Yes    Does patient have an active prescription for medications requested? Yes    Received Request Via: Pharmacy    Office Visit on 06/02/2021   Component Date Value   • POC Color 06/02/2021 angela    • POC Appearance 06/02/2021 cloudy    • POC Leukocyte Esterase 06/02/2021 small    • POC Nitrites 06/02/2021 neg    • POC Urobiligen 06/02/2021 neg    • POC Protein 06/02/2021 trace    • POC Urine PH 06/02/2021 5.5    • POC Blood 06/02/2021 trace    • POC Specific Gravity 06/02/2021 1.020    • POC Ketones 06/02/2021 neg    • POC Bilirubin 06/02/2021 neg    • POC Glucose 06/02/2021 neg    ]

## 2021-11-17 ENCOUNTER — OFFICE VISIT (OUTPATIENT)
Dept: MEDICAL GROUP | Facility: CLINIC | Age: 71
End: 2021-11-17
Payer: MEDICARE

## 2021-11-17 VITALS
HEART RATE: 64 BPM | OXYGEN SATURATION: 94 % | TEMPERATURE: 97.3 F | BODY MASS INDEX: 28.32 KG/M2 | RESPIRATION RATE: 12 BRPM | HEIGHT: 65 IN | WEIGHT: 170 LBS | SYSTOLIC BLOOD PRESSURE: 126 MMHG | DIASTOLIC BLOOD PRESSURE: 72 MMHG

## 2021-11-17 DIAGNOSIS — D50.9 IRON DEFICIENCY ANEMIA, UNSPECIFIED IRON DEFICIENCY ANEMIA TYPE: ICD-10-CM

## 2021-11-17 DIAGNOSIS — I77.811 ABDOMINAL AORTIC ECTASIA (HCC): ICD-10-CM

## 2021-11-17 DIAGNOSIS — E87.6 HYPOKALEMIA: ICD-10-CM

## 2021-11-17 DIAGNOSIS — F33.41 RECURRENT MAJOR DEPRESSIVE DISORDER, IN PARTIAL REMISSION (HCC): ICD-10-CM

## 2021-11-17 DIAGNOSIS — S12.112D CLOSED NONDISPLACED ODONTOID FRACTURE WITH TYPE II MORPHOLOGY AND ROUTINE HEALING: ICD-10-CM

## 2021-11-17 DIAGNOSIS — N18.31 STAGE 3A CHRONIC KIDNEY DISEASE: ICD-10-CM

## 2021-11-17 DIAGNOSIS — M80.08XA AGE-RELATED OSTEOPOROSIS WITH CURRENT PATHOLOGICAL FRACTURE, VERTEBRA(E), INITIAL ENCOUNTER FOR FRACTURE (HCC): ICD-10-CM

## 2021-11-17 DIAGNOSIS — Z00.00 MEDICARE ANNUAL WELLNESS VISIT, SUBSEQUENT: Primary | ICD-10-CM

## 2021-11-17 DIAGNOSIS — R73.03 PREDIABETES: ICD-10-CM

## 2021-11-17 DIAGNOSIS — K21.00 GASTROESOPHAGEAL REFLUX DISEASE WITH ESOPHAGITIS WITHOUT HEMORRHAGE: ICD-10-CM

## 2021-11-17 DIAGNOSIS — C51.9 MALIGNANT NEOPLASM OF VULVA, UNSPECIFIED (HCC): ICD-10-CM

## 2021-11-17 DIAGNOSIS — R10.13 EPIGASTRIC ABDOMINAL PAIN: ICD-10-CM

## 2021-11-17 DIAGNOSIS — N39.45 URINARY INCONTINENCE WITH CONTINUOUS LEAKAGE: ICD-10-CM

## 2021-11-17 DIAGNOSIS — Z23 NEED FOR VACCINATION: ICD-10-CM

## 2021-11-17 DIAGNOSIS — Z91.81 RISK FOR FALLS: ICD-10-CM

## 2021-11-17 DIAGNOSIS — R94.4 DECREASED GFR: ICD-10-CM

## 2021-11-17 PROBLEM — I77.9 DISORDER OF ARTERY (HCC): Status: ACTIVE | Noted: 2021-11-17

## 2021-11-17 PROBLEM — R68.84 PAIN IN LOWER JAW: Status: RESOLVED | Noted: 2020-05-29 | Resolved: 2021-11-17

## 2021-11-17 PROBLEM — I71.40 ABDOMINAL AORTIC ANEURYSM WITHOUT RUPTURE (HCC): Status: RESOLVED | Noted: 2020-08-05 | Resolved: 2021-11-17

## 2021-11-17 PROBLEM — M25.579 PAIN IN JOINT INVOLVING ANKLE AND FOOT: Status: RESOLVED | Noted: 2019-08-07 | Resolved: 2021-11-17

## 2021-11-17 PROBLEM — I77.9 DISORDER OF ARTERY (HCC): Status: RESOLVED | Noted: 2021-11-17 | Resolved: 2021-11-17

## 2021-11-17 PROBLEM — R82.90 ABNORMAL URINE ODOR: Status: RESOLVED | Noted: 2021-06-28 | Resolved: 2021-11-17

## 2021-11-17 PROBLEM — Z85.44 HISTORY OF CANCER OF VULVA: Status: RESOLVED | Noted: 2018-07-19 | Resolved: 2021-11-17

## 2021-11-17 PROCEDURE — G0439 PPPS, SUBSEQ VISIT: HCPCS | Mod: 25 | Performed by: PHYSICIAN ASSISTANT

## 2021-11-17 PROCEDURE — 90662 IIV NO PRSV INCREASED AG IM: CPT | Performed by: PHYSICIAN ASSISTANT

## 2021-11-17 PROCEDURE — G0008 ADMIN INFLUENZA VIRUS VAC: HCPCS | Performed by: PHYSICIAN ASSISTANT

## 2021-11-17 RX ORDER — SERTRALINE HYDROCHLORIDE 100 MG/1
200 TABLET, FILM COATED ORAL DAILY
Qty: 180 TABLET | Refills: 3 | Status: SHIPPED | OUTPATIENT
Start: 2021-11-17

## 2021-11-17 RX ORDER — OMEPRAZOLE 20 MG/1
40 CAPSULE, DELAYED RELEASE ORAL
Qty: 180 CAPSULE | Refills: 3 | Status: SHIPPED | OUTPATIENT
Start: 2021-11-17

## 2021-11-17 RX ORDER — OXYBUTYNIN CHLORIDE 15 MG/1
15 TABLET, EXTENDED RELEASE ORAL DAILY
Qty: 90 TABLET | Refills: 3 | Status: SHIPPED | OUTPATIENT
Start: 2021-11-17

## 2021-11-17 ASSESSMENT — FIBROSIS 4 INDEX: FIB4 SCORE: 0.89

## 2021-11-17 ASSESSMENT — PATIENT HEALTH QUESTIONNAIRE - PHQ9
5. POOR APPETITE OR OVEREATING: 0 - NOT AT ALL
CLINICAL INTERPRETATION OF PHQ2 SCORE: 5
SUM OF ALL RESPONSES TO PHQ QUESTIONS 1-9: 8

## 2021-11-17 ASSESSMENT — ACTIVITIES OF DAILY LIVING (ADL): BATHING_REQUIRES_ASSISTANCE: 0

## 2021-11-17 ASSESSMENT — ENCOUNTER SYMPTOMS: GENERAL WELL-BEING: POOR

## 2021-11-17 NOTE — PROGRESS NOTES
Chief Complaint   Patient presents with   • Annual Wellness Visit     HPI:  Shashank Christian is a 71 y.o. here for Medicare Annual Wellness Visit     Patient Active Problem List    Diagnosis Date Noted   • Abdominal aortic ectasia (HCC) 11/17/2021   • Facial skin lesion 06/02/2021   • Intermittent claudication of both lower extremities due to atherosclerosis ([facility]) 10/28/2020   • Occlusion and stenosis of bilateral carotid arteries 10/28/2020   • Epigastric abdominal pain 10/28/2020   • Bloody stool 09/08/2020   • Chronic nausea 09/08/2020   • Fibromyalgia 09/03/2020   • Baker's cyst 08/05/2020   • Stage 3 chronic kidney disease 08/05/2020   • Dyslipidemia 08/05/2020   • Hemorrhoids 08/05/2020   • Hip pain, chronic 08/05/2020   • Hypomagnesemia 08/05/2020   • Iron deficiency anemia 08/05/2020   • Personal history of other infectious and parasitic diseases 08/05/2020   • Atherosclerosis of aorta ([facility]) 05/29/2020   • Prediabetes 11/15/2019   • Iliotibial band syndrome of left side 11/15/2019   • Drug-induced constipation 11/15/2019   • Weakness 08/07/2019   • Preventative health care 07/10/2019   • Balance problem 06/07/2019   • Dizziness 06/07/2019   • 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   • Overactive bladder 03/22/2019   • Neck pain 03/22/2019   • History of colon polyps 07/19/2018   • Varicose veins of both lower extremities 07/19/2018   • Insomnia 07/21/2015   • 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   • Malignant neoplasm of vulva, unspecified ([facility]) 03/06/2015   • Hypertension 09/09/2014   • Gastroesophageal reflux disease with esophagitis without hemorrhage 09/09/2014   • Chronic low back pain 09/09/2014   • Depression 07/09/2014   • Malignant neoplasm ([facility]) 07/09/2014       Current Outpatient Medications   Medication Sig Dispense Refill   • omeprazole (PRILOSEC) 20 MG delayed-release capsule Take 2 Capsules by mouth every day. 180 Capsule 3   • sertraline (ZOLOFT) 100 MG Tab Take  2 Tablets by mouth every day. 180 Tablet 3   • oxybutynin (DITROPAN XL) 15 MG CR tablet Take 1 Tablet by mouth every day. 90 Tablet 3   • losartan (COZAAR) 50 MG Tab Take 1 Tablet by mouth every day. 90 Tablet 3   • Diclofenac Sodium 1 % Gel APPLY 4 GRAMS TO AFFECTED AREA UP TO THREE TIMES A DAY AS NEEDED FOR PAIN     • oxycodone-acetaminophen (PERCOCET-10)  MG Tab Take 1-2 Tabs by mouth every four hours as needed for Severe Pain.       No current facility-administered medications for this visit.          Current supplements as per medication list.     Allergies: Doxycycline    Current social contact/activities:      She  reports that she has never smoked. She has never used smokeless tobacco. She reports that she does not drink alcohol and does not use drugs.  Counseling given: Yes    DPA/Advanced Directive:  Patient does not have an Advanced Directive.  A packet and workshop information was given on Advanced Directives.    ROS:    Gait: Uses a walker  Ostomy: No  Other tubes: No  Amputations: No  Chronic oxygen use: No  Last eye exam: 2019  Wears hearing aids: No   : Reports urinary leakage during the last 6 months that has somewhat interfered with their daily activities or sleep.    Screening:    Depression Screening    Little interest or pleasure in doing things?everyday   3 - nearly every day  Feeling down, depressed , or hopeless?half  2 - more than half the days  Trouble falling or staying asleep, or sleeping too much? Nearly every 3 - nearly every day  Feeling tired or having little energy?no  0 - not at all  Poor appetite or overeating? no 0 - not at all  Feeling bad about yourself - or that you are a failure or have let yourself or your family down?no 0 - not at all  Trouble concentrating on things, such as reading the newspaper or watching television?no 0 - not at all  Moving or speaking so slowly that other people could have noticed.  Or the opposite - being so fidgety or restless that you have  been moving around a lot more than usual?no  0 - not at all  Thoughts that you would be better off dead, or of hurting yourself?no  0 - not at all  Patient Health Questionnaire Score: 8    If depressive symptoms identified deferred to follow up visit unless specifically addressed in assessment and plan.    Interpretation of PHQ-9 Total Score   Score Severity   1-4 No Depression   5-9 Mild Depression   10-14 Moderate Depression   15-19 Moderately Severe Depression   20-27 Severe Depression      Screening for Cognitive Impairment    Three Minute Recall (captain, garden, picture) 1/3    Jack clock face with all 12 numbers and set the hands to show 5 past 8.yes  Yes    Cognitive concerns identified deferred for follow up unless specifically addressed in assessment and plan.    Fall Risk Assessment    Has the patient had two or more falls in the last year or any fall with injury in the last year?yes  Yes    Safety Assessment    Throw rugs on floor.no  No  Handrails on all stairs.yes  Yes  Good lighting in all hallways. yes Yes  Difficulty hearing.no  No  Patient counseled about all safety risks that were identified.    Functional Assessment ADLs    Are there any barriers preventing you from cooking for yourself or meeting nutritional needs?no  No.    Are there any barriers preventing you from driving safely or obtaining transportation? no No.    Are there any barriers preventing you from using a telephone or calling for help? no No.    Are there any barriers preventing you from shopping?yes  Yes. Hard to walk around the store  Are there any barriers preventing you from taking care of your own finances?no  No.    Are there any barriers preventing you from managing your medications?no  No.    Are there any barriers preventing you from showering, bathing or dressing yourself?no No.    Are you currently engaging in any exercise or physical activity?no  No.     What is your perception of your health?poor  Poor.    Health  Maintenance Summary          Overdue - COVID-19 Vaccine (1) Overdue - never done    No completion history exists for this topic.          Overdue - IMM ZOSTER VACCINES (1 of 2) Overdue - never done    No completion history exists for this topic.          Overdue - MAMMOGRAM (Yearly) Overdue since 8/9/2015 08/09/2014  Previously completed    05/18/2006  [id number]    08/10/2004  [id number] (DIAGNOSTIC)          Overdue - BONE DENSITY (Every 5 Years) Overdue since 8/9/2019 08/09/2014  Previously completed          Overdue - PAP SMEAR (Every 3 Years) Overdue since 6/27/2020 06/27/2017  PATHOLOGY GYN SPECIMEN    10/18/2016  PATHOLOGY GYN SPECIMEN    06/09/2014  Previously completed - [place]          Overdue - IMM INFLUENZA (1) Overdue since 9/1/2021    10/28/2020  Imm Admin: Influenza Vaccine Adult HD    11/15/2019  Imm Admin: Influenza Vaccine Adult HD    11/13/2018  Imm Admin: Influenza Vaccine Adult HD    09/30/2013  Imm Admin: Influenza Seasonal Injectable - Historical Data    01/07/2013  Imm Admin: Influenza (IM) Preservative Free - HISTORICAL DATA          Postponed - COLORECTAL CANCER SCREENING (COLONOSCOPY - Every 10 Years) Postponed until 1/1/2025 09/09/2007  COLONOSCOPY (Previously completed)          Annual Wellness Visit (Every 366 Days) Next due on 11/18/2022 11/17/2021  Visit Dx: Medicare annual wellness visit, subsequent    11/17/2021  Level of Service: ANNUAL WELLNESS VISIT-INCLUDES PPPS SUBSEQUE*    09/09/2014  Done          IMM DTaP/Tdap/Td Vaccine (3 - Td or Tdap) Next due on 9/16/2031 09/16/2021  Imm Admin: Tdap Vaccine    03/02/2012  Imm Admin: Tdap Vaccine          HEPATITIS C SCREENING  Completed    03/17/2017  HEPATITIS PANEL ACUTE(4 COMPONENTS)          IMM PNEUMOCOCCAL VACCINE: 65+ Years (Series Information) Completed    11/15/2019  Imm Admin: Pneumococcal polysaccharide vaccine (PPSV-23)    11/13/2018  Imm Admin: Pneumococcal Conjugate Vaccine  (Prevnar/PCV-13)    01/07/2013  Imm Admin: Pneumococcal polysaccharide vaccine (PPSV-23)          IMM HEP B VACCINE (Series Information) Aged Out    No completion history exists for this topic.          IMM MENINGOCOCCAL VACCINE (MCV4) (Series Information) Aged Out    No completion history exists for this topic.                Patient Care Team:  Mabel Ng P.A.-C. as PCP - General (Physician Assistant)  Alfredo Nuñez M.D. as Consulting Physician (Orthopedic Surgery)  Fabian Craig M.D. as Consulting Physician (Gynecologic Oncology)  Andreas Callejas M.D. as Consulting Physician (Orthopedic Surgery)  Severo Taylor M.D. as Consulting Physician (Physical Medicine & Rehabilitation Pain Medicine)      Social History     Tobacco Use   • Smoking status: Never Smoker   • Smokeless tobacco: Never Used   Vaping Use   • Vaping Use: Never used   Substance Use Topics   • Alcohol use: No   • Drug use: No     Family History   Problem Relation Age of Onset   • Cancer Mother    • Heart Disease Father      She  has a past medical history of Abdominal aortic aneurysm without rupture (HCC) (8/5/2020), Abnormal urine odor (6/28/2021), Allergy, Arthritis, Depression (7/21/2015), Diabetes ([facility]), GERD (gastroesophageal reflux disease), Hyperlipidemia, Hypertension, Insomnia (7/21/2015), Kidney disease, Overactive bladder, Pain in joint involving ankle and foot (8/7/2019), Pain in lower jaw (5/29/2020), Urinary incontinence with continuous leakage, Vitamin D deficiency (7/21/2015), Vulvar cancer ([facility]), and Vulvar cancer ([facility]). She also has no past medical history of Anxiety, Asthma, Clotting disorder ([facility]), Heart attack ([facility]), Migraine, Seizure ([facility]), or Thyroid disease.   Past Surgical History:   Procedure Laterality Date   • IRRIGATION & DEBRIDEMENT ORTHO Left 3/19/2017    Procedure: IRRIGATION & DEBRIDEMENT ORTHO KNEE;  Surgeon: Rigo Irving M.D.;  Location: SURGERY [facility];  Service:    • KNEE REVISION TOTAL   "3/19/2017    Procedure: KNEE REVISION TOTAL FOR POLYETHYLENE LINER EXCHANGE;  Surgeon: Rigo Irving M.D.;  Location: SURGERY [facility];  Service:    • IRRIGATION & DEBRIDEMENT ORTHO Left 3/16/2017    Procedure: IRRIGATION & DEBRIDEMENT ORTHO-THIGH AND KNEE;  Surgeon: Rigo Irving M.D.;  Location: SURGERY [facility];  Service:    • HARDWARE REMOVAL ORTHO Left 3/16/2017    Procedure: HARDWARE REMOVAL ORTHO-THIGH;  Surgeon: Rigo Irving M.D.;  Location: SURGERY [facility];  Service:    • ABDOMINAL EXPLORATION     • GYN SURGERY     • SHOULDER ARTHROPLASTY TOTAL Left        Exam:   /72 (BP Location: Right arm, Patient Position: Sitting, BP Cuff Size: Adult)   Pulse 64   Temp 36.3 °C (97.3 °F) (Temporal)   Resp 12   Ht 1.651 m (5' 5\")   Wt 77.1 kg (170 lb)   SpO2 94%  Body mass index is 28.29 kg/m².    Hearing good.    Dentition upper and lower dentures  Alert, oriented in no acute distress.  Eye contact is good, speech goal directed, affect calm    Assessment and Plan. The following treatment and monitoring plan is recommended:    1. Medicare annual wellness visit, subsequent    2. Epigastric abdominal pain  - omeprazole (PRILOSEC) 20 MG delayed-release capsule; Take 2 Capsules by mouth every day.  Dispense: 180 Capsule; Refill: 3    3. Recurrent major depressive disorder, in partial remission (HCC)  - sertraline (ZOLOFT) 100 MG Tab; Take 2 Tablets by mouth every day.  Dispense: 180 Tablet; Refill: 3    4. Urinary incontinence with continuous leakage  - oxybutynin (DITROPAN XL) 15 MG CR tablet; Take 1 Tablet by mouth every day.  Dispense: 90 Tablet; Refill: 3    5. Gastroesophageal reflux disease with esophagitis without hemorrhage  - omeprazole (PRILOSEC) 20 MG delayed-release capsule; Take 2 Capsules by mouth every day.  Dispense: 180 Capsule; Refill: 3    6. Prediabetes  - HEMOGLOBIN A1C; Future  - Comp Metabolic Panel; Future    7. Decreased GFR  - ESTIMATED GFR; " Future    8. Hypokalemia  - Comp Metabolic Panel; Future    9. Stage 3a chronic kidney disease (HCC)  - ESTIMATED GFR; Future    10. Iron deficiency anemia, unspecified iron deficiency anemia type  - CBC WITH DIFFERENTIAL; Future      Services suggested: No services needed at this time  Health Care Screening: Age-appropriate preventive services recommended by USPTF and ACIP covered by Medicare were discussed today. Services ordered if indicated and agreed upon by the patient.  Referrals offered: Community-based lifestyle interventions to reduce health risks and promote self-management and wellness, fall prevention, nutrition, physical activity, tobacco-use cessation, weight loss, and mental health services as per orders if indicated.    Discussion today about general wellness and lifestyle habits:    · Prevent falls and reduce trip hazards; Cautioned about securing or removing rugs.  · Have a working fire alarm and carbon monoxide detector;   · Engage in regular physical activity and social activities     Follow-up: Return in about 4 weeks (around 12/15/2021) for f/u labs.

## 2021-11-28 NOTE — CARE PLAN
Problem: Pain Management  Goal: Pain level will decrease to patient’s comfort goal  Outcome: PROGRESSING AS EXPECTED  Pain controlled with oxy 10 mg.    Problem: Mobility  Goal: Risk for activity intolerance will decrease  Outcome: PROGRESSING AS EXPECTED  Ambulated up to the bathroom standby assist using front wheel walker.          No

## 2021-12-16 ENCOUNTER — OFFICE VISIT (OUTPATIENT)
Dept: MEDICAL GROUP | Facility: CLINIC | Age: 71
End: 2021-12-16
Payer: MEDICARE

## 2021-12-16 VITALS
RESPIRATION RATE: 14 BRPM | DIASTOLIC BLOOD PRESSURE: 82 MMHG | WEIGHT: 174.8 LBS | HEIGHT: 66 IN | HEART RATE: 62 BPM | TEMPERATURE: 98.1 F | OXYGEN SATURATION: 98 % | BODY MASS INDEX: 28.09 KG/M2 | SYSTOLIC BLOOD PRESSURE: 122 MMHG

## 2021-12-16 DIAGNOSIS — G47.00 INSOMNIA, UNSPECIFIED TYPE: ICD-10-CM

## 2021-12-16 PROCEDURE — 99213 OFFICE O/P EST LOW 20 MIN: CPT | Performed by: PHYSICIAN ASSISTANT

## 2021-12-16 RX ORDER — TRAZODONE HYDROCHLORIDE 100 MG/1
100 TABLET ORAL
Qty: 90 TABLET | Refills: 0 | Status: SHIPPED | OUTPATIENT
Start: 2021-12-16

## 2021-12-16 ASSESSMENT — FIBROSIS 4 INDEX: FIB4 SCORE: 0.89

## 2021-12-30 ASSESSMENT — ENCOUNTER SYMPTOMS
RESPIRATORY NEGATIVE: 1
DEPRESSION: 0
INSOMNIA: 1
NERVOUS/ANXIOUS: 0
NEUROLOGICAL NEGATIVE: 1
GASTROINTESTINAL NEGATIVE: 1
MUSCULOSKELETAL NEGATIVE: 1
EYES NEGATIVE: 1
CONSTITUTIONAL NEGATIVE: 1
CARDIOVASCULAR NEGATIVE: 1

## 2021-12-30 ASSESSMENT — LIFESTYLE VARIABLES: SUBSTANCE_ABUSE: 0

## 2021-12-30 ASSESSMENT — VISUAL ACUITY: OU: 1

## 2021-12-30 NOTE — PROGRESS NOTES
Subjective   Shashank Christian is a 71 y.o. female who presents with Medication Refill (Pt needs medication refill on trazadone)    1. Insomnia, unspecified type  Patient here today for a refill of her trazodone that she uses for sleep. SHe states that this dose works well for her and she is sleeping through the night and waking well rested. Refill sent to pharmacy.   traZODone (DESYREL) 100 MG Tab; Take 1 Tablet by mouth at bedtime. For sleep.  Dispense: 90 Tablet; Refill: 0    Past Medical History:  8/5/2020: Abdominal aortic aneurysm without rupture ([facility])  6/28/2021: Abnormal urine odor  No date: Allergy  No date: Arthritis  7/21/2015: Depression  No date: Diabetes ([facility])  No date: GERD (gastroesophageal reflux disease)  No date: Hyperlipidemia  No date: Hypertension  7/21/2015: Insomnia  No date: Kidney disease  No date: Overactive bladder  8/7/2019: Pain in joint involving ankle and foot  5/29/2020: Pain in lower jaw  No date: Urinary incontinence with continuous leakage  7/21/2015: Vitamin D deficiency  No date: Vulvar cancer ([facility])      Comment:  had chemo and radiation and then surgery for recurrence,               Dr. SOMMER  No date: Vulvar cancer ([facility])  Past Surgical History:  3/19/2017: IRRIGATION & DEBRIDEMENT ORTHO; Left      Comment:  Procedure: IRRIGATION & DEBRIDEMENT ORTHO KNEE;                 Surgeon: Rigo Irving M.D.;  Location: [facility];  Service:   3/19/2017: KNEE REVISION TOTAL      Comment:  Procedure: KNEE REVISION TOTAL FOR POLYETHYLENE LINER                EXCHANGE;  Surgeon: Rigo Irving M.D.;  Location:                [facility];  Service:   3/16/2017: IRRIGATION & DEBRIDEMENT ORTHO; Left      Comment:  Procedure: IRRIGATION & DEBRIDEMENT ORTHO-THIGH AND                KNEE;  Surgeon: Rigo Irving M.D.;  Location:                [facility];  Service:   3/16/2017: HARDWARE REMOVAL ORTHO; Left      Comment:  Procedure:  HARDWARE REMOVAL ORTHO-THIGH;  Surgeon:                Rigo Irving M.D.;  Location: SURGERY [facility];  Service:   No date: ABDOMINAL EXPLORATION  No date: GYN SURGERY  No date: SHOULDER ARTHROPLASTY TOTAL; Left  Social History    Tobacco Use      Smoking status: Never Smoker      Smokeless tobacco: Never Used    Vaping Use      Vaping Use: Never used    Alcohol use: No    Drug use: No    Review of patient's family history indicates:  Problem: Cancer      Relation: Mother          Age of Onset: (Not Specified)  Problem: Heart Disease      Relation: Father          Age of Onset: (Not Specified)      Current Outpatient Medications: •  traZODone (DESYREL) 100 MG Tab, Take 1 Tablet by mouth at bedtime. For sleep., Disp: 90 Tablet, Rfl: 0•  omeprazole (PRILOSEC) 20 MG delayed-release capsule, Take 2 Capsules by mouth every day., Disp: 180 Capsule, Rfl: 3•  sertraline (ZOLOFT) 100 MG Tab, Take 2 Tablets by mouth every day., Disp: 180 Tablet, Rfl: 3•  oxybutynin (DITROPAN XL) 15 MG CR tablet, Take 1 Tablet by mouth every day., Disp: 90 Tablet, Rfl: 3•  losartan (COZAAR) 50 MG Tab, Take 1 Tablet by mouth every day., Disp: 90 Tablet, Rfl: 3•  Diclofenac Sodium 1 % Gel, APPLY 4 GRAMS TO AFFECTED AREA UP TO THREE TIMES A DAY AS NEEDED FOR PAIN, Disp: , Rfl: •  oxycodone-acetaminophen (PERCOCET-10)  MG Tab, Take 1-2 Tabs by mouth every four hours as needed for Severe Pain., Disp: , Rfl:     Patient was instructed on the use of medications, either prescriptions or OTC and informed on when the appropriate follow up time period should be. In addition, patient was also instructed that should any acute worsening occur that they should notify this clinic asap or call 911.      Review of Systems   Constitutional: Negative.    HENT: Negative.    Eyes: Negative.    Respiratory: Negative.    Cardiovascular: Negative.    Gastrointestinal: Negative.    Genitourinary: Negative.    Musculoskeletal: Negative.   "  Skin: Negative.    Neurological: Negative.    Endo/Heme/Allergies: Negative.    Psychiatric/Behavioral: Negative for depression, substance abuse and suicidal ideas. The patient has insomnia. The patient is not nervous/anxious.      Objective     /82 (BP Location: Right arm, Patient Position: Sitting, BP Cuff Size: Adult)   Pulse 62   Temp 36.7 °C (98.1 °F) (Temporal)   Resp 14   Ht 1.664 m (5' 5.5\")   Wt 79.3 kg (174 lb 12.8 oz)   LMP 10/01/1990   SpO2 98%   BMI 28.65 kg/m²      Physical Exam  Vitals and nursing note reviewed.   Constitutional:       Appearance: Normal appearance. She is well-developed and well-groomed.   HENT:      Head: Normocephalic and atraumatic.      Nose: Nose normal.      Mouth/Throat:      Lips: Pink. No lesions.      Mouth: Mucous membranes are moist.   Eyes:      General: Lids are normal. Vision grossly intact. Gaze aligned appropriately.      Extraocular Movements: Extraocular movements intact.      Conjunctiva/sclera: Conjunctivae normal.      Pupils: Pupils are equal, round, and reactive to light.   Neck:      Thyroid: No thyromegaly.      Vascular: No carotid bruit or JVD.      Trachea: Trachea and phonation normal.   Cardiovascular:      Rate and Rhythm: Normal rate and regular rhythm.      Heart sounds: Normal heart sounds. No murmur heard.  No friction rub. No gallop.    Pulmonary:      Effort: Pulmonary effort is normal.      Breath sounds: Normal breath sounds. No wheezing, rhonchi or rales.   Musculoskeletal:         General: Normal range of motion.      Cervical back: Normal range of motion and neck supple.      Right lower leg: No edema.      Left lower leg: No edema.   Lymphadenopathy:      Cervical: No cervical adenopathy.   Skin:     General: Skin is warm and dry.      Capillary Refill: Capillary refill takes less than 2 seconds.      Findings: No lesion or rash.   Neurological:      Mental Status: She is alert and oriented to person, place, and time.      " Cranial Nerves: Cranial nerves are intact.   Psychiatric:         Attention and Perception: Attention and perception normal.         Mood and Affect: Mood and affect normal.         Speech: Speech normal.         Behavior: Behavior normal. Behavior is cooperative.         Thought Content: Thought content normal.         Judgment: Judgment normal.       Assessment & Plan      1. Insomnia, unspecified type    - traZODone (DESYREL) 100 MG Tab; Take 1 Tablet by mouth at bedtime. For sleep.  Dispense: 90 Tablet; Refill: 0

## 2022-01-19 DIAGNOSIS — I10 ESSENTIAL HYPERTENSION: ICD-10-CM

## 2022-01-19 RX ORDER — LOSARTAN POTASSIUM 50 MG/1
50 TABLET ORAL DAILY
Qty: 90 TABLET | Refills: 1 | Status: SHIPPED | OUTPATIENT
Start: 2022-01-19

## 2022-11-03 ENCOUNTER — PATIENT MESSAGE (OUTPATIENT)
Dept: HEALTH INFORMATION MANAGEMENT | Facility: OTHER | Age: 72
End: 2022-11-03

## (undated) DEVICE — SUCTION INSTRUMENT YANKAUER BULBOUS TIP W/O VENT (50EA/CA)

## (undated) DEVICE — SUTURE 1 VICRYL PLUS CT-1 - 18 INCH (12/BX)

## (undated) DEVICE — GLOVE BIOGEL INDICATOR SZ 8 SURGICAL PF LTX - (50/BX 4BX/CA)

## (undated) DEVICE — WRAP COBAN SELF-ADHERENT 6 IN X  5YDS STERILE TAN (12/CA)

## (undated) DEVICE — SODIUM CHL. IRRIGATION 0.9% 3000ML (4EA/CA 65CA/PF)

## (undated) DEVICE — SPONGE GAUZE STER 4X4 8-PL - (2/PK 50PK/BX 12BX/CS)

## (undated) DEVICE — GOWN WARMING STANDARD FLEX - (30/CA)

## (undated) DEVICE — HANDPIECE 10FT INTPLS SCT PLS IRRIGATION HAND CONTROL SET (6/PK)

## (undated) DEVICE — KIT ANESTHESIA W/CIRCUIT & 3/LT BAG W/FILTER (20EA/CA)

## (undated) DEVICE — DRAPE IOBAN II 23 IN X 33 IN - (10/BX)

## (undated) DEVICE — GLOVE BIOGEL ECLIPSE PF LATEX SIZE 8.0  (50PR/BX)

## (undated) DEVICE — ELECTRODE DUAL RETURN W/ CORD - (50/PK)

## (undated) DEVICE — CLOSURE SKIN STRIP 1/2 X 4 IN - (STERI STRIP) (50/BX 4BX/CA)

## (undated) DEVICE — TOWELS CLOTH SURGICAL - (4/PK 20PK/CA)

## (undated) DEVICE — TRAY CATHETER FOLEY URINE METER W/STATLOCK 350ML (10EA/CA)

## (undated) DEVICE — BLADE 90X18X1.27MM SAW SAGITTAL

## (undated) DEVICE — SET IRRIGATION CYSTOSCOPY TUBE L80 IN (20EA/CA)

## (undated) DEVICE — CHLORAPREP 26 ML APPLICATOR - ORANGE TINT(25/CA)

## (undated) DEVICE — CANISTER SUCTION 3000ML MECHANICAL FILTER AUTO SHUTOFF MEDI-VAC NONSTERILE LF DISP  (40EA/CA)

## (undated) DEVICE — PAD LAP STERILE 18 X 18 - (5/PK 40PK/CA)

## (undated) DEVICE — SLEEVE, VASO, THIGH, MED

## (undated) DEVICE — STOCKINET BIAS 6 IN STERILE - (20/CA)

## (undated) DEVICE — MASK ANESTHESIA ADULT  - (100/CA)

## (undated) DEVICE — DRAPE U ORTHOPEDIC - (10/BX)

## (undated) DEVICE — DRAIN JACKSON PRATT 10FR - (10/CS)

## (undated) DEVICE — DRESSING ABDOMINAL PAD STERILE 8 X 10" (360EA/CA)"

## (undated) DEVICE — SPLINT PLASTER 5 IN X 30 IN - (50EA/BX 6BX/CA)

## (undated) DEVICE — GLOVE BIOGEL ECLIPSE PF LATEX SIZE 7.5

## (undated) DEVICE — BANDAGE ELASTIC 6 HONEYCOMB - 6X5YD LF (20/CA)"

## (undated) DEVICE — GLOVE BIOGEL SZ 7.5 SURGICAL PF LTX - (50PR/BX 4BX/CA)

## (undated) DEVICE — TOURNIQUET CUFF 34 X 4 ONE PORT DISP - STERILE (10/BX)

## (undated) DEVICE — STAPLER SKIN DISP - (6/BX 10BX/CA) VISISTAT

## (undated) DEVICE — TUBING CLEARLINK DUO-VENT - C-FLO (48EA/CA)

## (undated) DEVICE — SYRINGE SAFETY 10 ML 18 GA X 1 1/2 BLUNT LL (100/BX 4BX/CA)

## (undated) DEVICE — SET EXTENSION WITH 2 PORTS (48EA/CA) ***PART #2C8610 IS A SUBSTITUTE*****

## (undated) DEVICE — SUTURE 2-0 VICRYL PLUS CT-1 - 8 X 18 INCH(12/BX)

## (undated) DEVICE — LENS/HOOD FOR SPACESUIT - (32/PK) PEEL AWAY FACE

## (undated) DEVICE — DRAPE C-ARM LARGE 41IN X 74 IN - (10/BX 2BX/CA)

## (undated) DEVICE — LACTATED RINGERS INJ 1000 ML - (14EA/CA 60CA/PF)

## (undated) DEVICE — SUTURE GENERAL

## (undated) DEVICE — CONTAINER SPECIMEN BAG OR - STERILE 4 OZ W/LID (100EA/CA)

## (undated) DEVICE — PACK TOTAL KNEE  (1/CA)

## (undated) DEVICE — DRAPE LARGE 3 QUARTER - (20/CA)

## (undated) DEVICE — WATER IRRIG. STER. 1500 ML - (9/CA)

## (undated) DEVICE — TUBE, CULTURE AEROBIC

## (undated) DEVICE — KIT ROOM DECONTAMINATION

## (undated) DEVICE — SODIUM CHL IRRIGATION 0.9% 1000ML (12EA/CA)

## (undated) DEVICE — STERI STRIP COMPOUND BENZOIN - TINCTURE 0.6ML WITH APPLICATOR (40EA/BX)

## (undated) DEVICE — BLADE SAGITTAL SAW 90 X 12.5 X 1.27MM  (1/EA)

## (undated) DEVICE — IMMOBILIZER KNEE 20 INCH - (1/EA)

## (undated) DEVICE — GLOVE BIOGEL SZ 7 SURGICAL PF LTX - (50PR/BX 4BX/CA)

## (undated) DEVICE — DRESSING PETROLEUM GAUZE 5 X 9" (50EA/BX 4BX/CA)"

## (undated) DEVICE — RESERVOIR SUCTION 100 CC - SILICONE (20EA/CA)

## (undated) DEVICE — SENSOR SPO2 NEO LNCS ADHESIVE (20/BX) SEE USER NOTES

## (undated) DEVICE — MIXER BONE CEMENT REVOLUTION - W/FEMORAL PRESSURIZER (6/CA)

## (undated) DEVICE — PAD PREP 24 X 48 CUFFED - (100/CA)

## (undated) DEVICE — GLOVE BIOGEL PI INDICATOR SZ 8.0 SURGICAL PF LF -(50/BX 4BX/CA)

## (undated) DEVICE — PROTECTOR ULNA NERVE - (36PR/CA)

## (undated) DEVICE — PEN SKIN MARKER W/RULER - (50EA/BX)

## (undated) DEVICE — MASK, LARYNGEAL AIRWAY #4

## (undated) DEVICE — LEAD SET 6 DISP. EKG NIHON KOHDEN

## (undated) DEVICE — PADDING CAST 6 IN STERILE - 6 X 4 YDS (24/CA)

## (undated) DEVICE — NEEDLE NON SAFETY HYPO 22 GA X 1 1/2 IN (100/BX)

## (undated) DEVICE — TIP INTPLS HFLO ML ORFC BTRY - (12/CS)  FOR SURGILAV

## (undated) DEVICE — GLOVE BIOGEL SZ 8 SURGICAL PF LTX - (50PR/BX 4BX/CA)

## (undated) DEVICE — BRUSH FMCNL TIP IRR STRL - (12/PKG) FOR SURGILAV

## (undated) DEVICE — SUTURE 0 VICRYL PLUS CTX - 36 INCH (36/BX)

## (undated) DEVICE — PADDING CAST 4 IN STERILE - 4 X 4 YDS (24/CA)

## (undated) DEVICE — GLOVE BIOGEL INDICATOR SZ 8.5 SURGICAL PF LTX - (50/BX 4BX/CA)

## (undated) DEVICE — BLADE SAGITTAL SAW 9.4MM X 25.5MM X .4MM FINE TOOTH (1/EA)

## (undated) DEVICE — BANDAGE ELASTIC 4 HONEYCOMB - 4"X5YD LF (20/CA)"

## (undated) DEVICE — PACK LOWER EXTREMITY - (2/CA)

## (undated) DEVICE — STOCKINET TUBULAR 6IN STERILE - 6 X 48YDS (25/CA)

## (undated) DEVICE — LEAD SET 6 DISP. EKG NIHON KOHDEN (100EA/CA) [9859].

## (undated) DEVICE — SET LEADWIRE 5 LEAD BEDSIDE DISPOSABLE ECG (1SET OF 5/EA)

## (undated) DEVICE — SPONGE GAUZESTER 4 X 4 4PLY - (128PK/CA)

## (undated) DEVICE — NEPTUNE 4 PORT MANIFOLD - (20/PK)

## (undated) DEVICE — ELECTRODE 850 FOAM ADHESIVE - HYDROGEL RADIOTRNSPRNT (50/PK)

## (undated) DEVICE — SWAB ANAEROBIC SPEC.COLLECTOR - (25/PK 4PK/CA 100EA/CA)

## (undated) DEVICE — HEAD HOLDER JUNIOR/ADULT